# Patient Record
Sex: MALE | Race: WHITE | NOT HISPANIC OR LATINO | Employment: OTHER | URBAN - METROPOLITAN AREA
[De-identification: names, ages, dates, MRNs, and addresses within clinical notes are randomized per-mention and may not be internally consistent; named-entity substitution may affect disease eponyms.]

---

## 2017-04-08 PROBLEM — D53.9 MACROCYTIC ANEMIA: Status: ACTIVE | Noted: 2017-04-08

## 2017-04-10 ENCOUNTER — CONSULT (OUTPATIENT)
Dept: ONCOLOGY | Facility: CLINIC | Age: 82
End: 2017-04-10

## 2017-04-10 VITALS
WEIGHT: 157 LBS | BODY MASS INDEX: 23.79 KG/M2 | HEIGHT: 68 IN | DIASTOLIC BLOOD PRESSURE: 60 MMHG | SYSTOLIC BLOOD PRESSURE: 106 MMHG | TEMPERATURE: 97 F | RESPIRATION RATE: 16 BRPM | HEART RATE: 69 BPM

## 2017-04-10 DIAGNOSIS — D53.9 MACROCYTIC ANEMIA: Primary | ICD-10-CM

## 2017-04-10 PROCEDURE — 99204 OFFICE O/P NEW MOD 45 MIN: CPT | Performed by: INTERNAL MEDICINE

## 2017-04-10 RX ORDER — TAMSULOSIN HYDROCHLORIDE 0.4 MG/1
1 CAPSULE ORAL NIGHTLY
COMMUNITY
End: 2021-01-01

## 2017-04-10 RX ORDER — FLUTICASONE PROPIONATE 50 MCG
SPRAY, SUSPENSION (ML) NASAL
Refills: 1 | COMMUNITY
Start: 2017-04-05 | End: 2021-01-01

## 2017-04-10 RX ORDER — GUAIFENESIN AND PSEUDOEPHEDRINE HYDROCHLORIDE 600; 60 MG/1; MG/1
TABLET, EXTENDED RELEASE ORAL
Refills: 0 | COMMUNITY
Start: 2017-04-05 | End: 2021-01-01

## 2017-04-10 RX ORDER — ALBUTEROL SULFATE 90 UG/1
2 AEROSOL, METERED RESPIRATORY (INHALATION) EVERY 4 HOURS PRN
COMMUNITY
End: 2021-01-01

## 2017-04-10 RX ORDER — PRAVASTATIN SODIUM 20 MG
20 TABLET ORAL NIGHTLY
COMMUNITY
End: 2017-05-23

## 2017-04-10 NOTE — PROGRESS NOTES
"CHIEF COMPLAINT: Macrocytic anemia    REASON FOR REFERRAL: Shana 6 anemia      RECORDS OBTAINED  Records of the patients history including those obtained from  Jagdeep Astudillo were reviewed and summarized in detail.      HISTORY OF PRESENT ILLNESS:  The patient is a 82 y.o.  male, referred for hemoglobin in the upper 11's with an MCV in the upper 90s with reportedly normal B-12 and folic acid.  No change in the color caliber consisting see if his stools.  No jaundice.    REVIEW OF SYSTEMS:  A 14 point review of systems was performed and is negative except as noted above.    History reviewed. No pertinent past medical history.  Past Surgical History:   Procedure Laterality Date   • COLONOSCOPY     • HERNIA REPAIR      1982 & 2014   • SINUS SURGERY      Multiple       No current outpatient prescriptions on file prior to visit.     No current facility-administered medications on file prior to visit.        Allergies   Allergen Reactions   • Bactrim [Sulfamethoxazole-Trimethoprim]    • Penicillins        Social History     Social History   • Marital status:      Spouse name: N/A   • Number of children: N/A   • Years of education: N/A     Social History Main Topics   • Smoking status: Never Smoker   • Smokeless tobacco: Never Used   • Alcohol use Yes      Comment: occ- 4x/monthly   • Drug use: No   • Sexual activity: Not Asked     Other Topics Concern   • None     Social History Narrative   • None       Family History   Problem Relation Age of Onset   • Aneurysm Father    • Cancer Other        PHYSICAL EXAM:    /60  Pulse 69  Temp 97 °F (36.1 °C) (Temporal Artery )   Resp 16  Ht 68\" (172.7 cm)  Wt 157 lb (71.2 kg)  BMI 23.87 kg/m2    ECOG: (2) Ambulatory and capable of self care, unable to carry out work activity, up and about > 50% or waking hours  General: well appearing male in no acute distress  HEENT: sclera anicteric, oropharynx clear  Lymphatics: no cervical, supraclavicular, inguinal, or axillary " adenopathy  Cardiovascular: regular rate and rhythm, no murmurs  Neck: Supple; No thyromegaly  Lungs: clear to auscultation bilaterally. No respiratory distress.   Abdomen: soft, nontender, nondistended.  No palpable organomegaly  Extremities: no cyanosis, clubbing, edema, or cords  Skin: no rashes, lesions, bruising, or petechiae  Neuro: Alert and oriented x 4; Moving all extremities.  Psych: No anxiety or depression    No results found for any previous visit.    Assessment/Plan     Mild macrocytic anemia: The right doubt he has hemolytic anemia causing reticulocytosis, we will check that.  We will also check his methylmalonic acid is occasionally people can have B12 deficiency despite normal B-12 levels.  Beyond that, at the age of 82 with such mild anemia, I would not put him through a bone marrow biopsy as, even if he were to have underlying myelodysplasia, I would only transfuse as need be and if we get into frequent transfusions would perhaps check a marrow at that point to determine whether or not we would give erythropoietin if this were myelodysplastic syndrome.  I would not give him a hypo-methylating agent at this stage in his life.  If these tests are negative then I would just check his blood work about twice a year with a CBC and if the anemia worsens then we can do further evaluation.  His last colonoscopy was 7 years ago in [facility].  We'll get him set up for colonoscopy.  I've spoken with Dr. Astudillo regarding this plan.    Errors in dictation may reflect use of voice recognition software and not all errors in transcription may have been detected prior to signing.    Nam Hurd MD    4/10/2017

## 2017-04-18 ENCOUNTER — TELEPHONE (OUTPATIENT)
Dept: ONCOLOGY | Facility: CLINIC | Age: 82
End: 2017-04-18

## 2017-04-18 NOTE — TELEPHONE ENCOUNTER
----- Message from Anjana Gomes sent at 4/18/2017  2:21 PM EDT -----  Regarding: eden salinas                     pt left message for brad  Contact: [phone]  This patient just wanted to let Brad know why he cxled his appt for today with her.    Pt said he could not complete his colonoscopy and has to go back.    Any questions, please call the patient.

## 2017-05-04 ENCOUNTER — TELEPHONE (OUTPATIENT)
Dept: ONCOLOGY | Facility: CLINIC | Age: 82
End: 2017-05-04

## 2017-05-23 ENCOUNTER — LAB (OUTPATIENT)
Dept: LAB | Facility: HOSPITAL | Age: 82
End: 2017-05-23

## 2017-05-23 ENCOUNTER — OFFICE VISIT (OUTPATIENT)
Dept: ONCOLOGY | Facility: CLINIC | Age: 82
End: 2017-05-23

## 2017-05-23 VITALS
HEIGHT: 68 IN | SYSTOLIC BLOOD PRESSURE: 94 MMHG | RESPIRATION RATE: 16 BRPM | HEART RATE: 74 BPM | WEIGHT: 155 LBS | TEMPERATURE: 97.5 F | DIASTOLIC BLOOD PRESSURE: 46 MMHG | BODY MASS INDEX: 23.49 KG/M2

## 2017-05-23 DIAGNOSIS — D53.9 MACROCYTIC ANEMIA: ICD-10-CM

## 2017-05-23 DIAGNOSIS — D53.9 MACROCYTIC ANEMIA: Primary | ICD-10-CM

## 2017-05-23 LAB
ALBUMIN SERPL-MCNC: 3.8 G/DL (ref 3.2–4.8)
ALBUMIN/GLOB SERPL: 1.5 G/DL (ref 1.5–2.5)
ALP SERPL-CCNC: 87 U/L (ref 25–100)
ALT SERPL W P-5'-P-CCNC: 25 U/L (ref 7–40)
ANION GAP SERPL CALCULATED.3IONS-SCNC: 3 MMOL/L (ref 3–11)
AST SERPL-CCNC: 28 U/L (ref 0–33)
BILIRUB SERPL-MCNC: 1.8 MG/DL (ref 0.3–1.2)
BUN BLD-MCNC: 17 MG/DL (ref 9–23)
BUN/CREAT SERPL: 21.3 (ref 7–25)
CALCIUM SPEC-SCNC: 9.4 MG/DL (ref 8.7–10.4)
CHLORIDE SERPL-SCNC: 109 MMOL/L (ref 99–109)
CO2 SERPL-SCNC: 30 MMOL/L (ref 20–31)
CREAT BLD-MCNC: 0.8 MG/DL (ref 0.6–1.3)
ERYTHROCYTE [DISTWIDTH] IN BLOOD BY AUTOMATED COUNT: 15.9 % (ref 11.3–14.5)
FERRITIN SERPL-MCNC: 138 NG/ML (ref 22–322)
FOLATE SERPL-MCNC: >24 NG/ML (ref 3.2–20)
GFR SERPL CREATININE-BSD FRML MDRD: 93 ML/MIN/1.73
GLOBULIN UR ELPH-MCNC: 2.6 GM/DL
GLUCOSE BLD-MCNC: 101 MG/DL (ref 70–100)
HCT VFR BLD AUTO: 33.8 % (ref 38.9–50.9)
HGB BLD-MCNC: 11.2 G/DL (ref 13.1–17.5)
IRON 24H UR-MRATE: 138 MCG/DL (ref 50–175)
IRON SATN SFR SERPL: 46 % (ref 20–50)
LYMPHOCYTES # BLD AUTO: 2.4 10*3/MM3 (ref 0.6–4.8)
LYMPHOCYTES NFR BLD AUTO: 30.2 % (ref 24–44)
MCH RBC QN AUTO: 33.2 PG (ref 27–31)
MCHC RBC AUTO-ENTMCNC: 33.2 G/DL (ref 32–36)
MCV RBC AUTO: 99.9 FL (ref 80–99)
MONOCYTES # BLD AUTO: 0.6 10*3/MM3 (ref 0–1)
MONOCYTES NFR BLD AUTO: 7.3 % (ref 0–12)
NEUTROPHILS # BLD AUTO: 4.9 10*3/MM3 (ref 1.5–8.3)
NEUTROPHILS NFR BLD AUTO: 62.5 % (ref 41–71)
PLATELET # BLD AUTO: 360 10*3/MM3 (ref 150–450)
PMV BLD AUTO: 6.1 FL (ref 6–12)
POTASSIUM BLD-SCNC: 4.5 MMOL/L (ref 3.5–5.5)
PROT SERPL-MCNC: 6.4 G/DL (ref 5.7–8.2)
RBC # BLD AUTO: 3.39 10*6/MM3 (ref 4.2–5.76)
RETICS/RBC NFR AUTO: 4.01 % (ref 0.5–1.5)
SODIUM BLD-SCNC: 142 MMOL/L (ref 132–146)
TIBC SERPL-MCNC: 300 MCG/DL (ref 250–450)
TSH SERPL DL<=0.05 MIU/L-ACNC: 2.47 MIU/ML (ref 0.35–5.35)
VIT B12 BLD-MCNC: 1947 PG/ML (ref 211–911)
WBC NRBC COR # BLD: 7.8 10*3/MM3 (ref 3.5–10.8)

## 2017-05-23 PROCEDURE — 83010 ASSAY OF HAPTOGLOBIN QUANT: CPT | Performed by: INTERNAL MEDICINE

## 2017-05-23 PROCEDURE — 82746 ASSAY OF FOLIC ACID SERUM: CPT | Performed by: INTERNAL MEDICINE

## 2017-05-23 PROCEDURE — 82728 ASSAY OF FERRITIN: CPT | Performed by: INTERNAL MEDICINE

## 2017-05-23 PROCEDURE — 83550 IRON BINDING TEST: CPT | Performed by: INTERNAL MEDICINE

## 2017-05-23 PROCEDURE — 80053 COMPREHEN METABOLIC PANEL: CPT | Performed by: INTERNAL MEDICINE

## 2017-05-23 PROCEDURE — 83540 ASSAY OF IRON: CPT | Performed by: INTERNAL MEDICINE

## 2017-05-23 PROCEDURE — 82607 VITAMIN B-12: CPT | Performed by: INTERNAL MEDICINE

## 2017-05-23 PROCEDURE — 83921 ORGANIC ACID SINGLE QUANT: CPT | Performed by: INTERNAL MEDICINE

## 2017-05-23 PROCEDURE — 36415 COLL VENOUS BLD VENIPUNCTURE: CPT

## 2017-05-23 PROCEDURE — 85025 COMPLETE CBC W/AUTO DIFF WBC: CPT

## 2017-05-23 PROCEDURE — 99213 OFFICE O/P EST LOW 20 MIN: CPT | Performed by: NURSE PRACTITIONER

## 2017-05-23 PROCEDURE — 84443 ASSAY THYROID STIM HORMONE: CPT | Performed by: INTERNAL MEDICINE

## 2017-05-23 PROCEDURE — 85045 AUTOMATED RETICULOCYTE COUNT: CPT | Performed by: INTERNAL MEDICINE

## 2017-05-23 RX ORDER — PRAVASTATIN SODIUM 40 MG
40 TABLET ORAL NIGHTLY
COMMUNITY
Start: 2017-04-27

## 2017-05-23 RX ORDER — PANTOPRAZOLE SODIUM 40 MG/1
TABLET, DELAYED RELEASE ORAL
Refills: 0 | COMMUNITY
Start: 2017-04-21 | End: 2021-01-01

## 2017-05-24 LAB — HAPTOGLOB SERPL-MCNC: <10 MG/DL (ref 34–200)

## 2017-05-26 LAB — METHYLMALONATE SERPL-SCNC: 146 NMOL/L (ref 0–378)

## 2017-07-20 ENCOUNTER — TELEPHONE (OUTPATIENT)
Dept: ONCOLOGY | Facility: CLINIC | Age: 82
End: 2017-07-20

## 2017-07-20 ENCOUNTER — LAB (OUTPATIENT)
Dept: LAB | Facility: HOSPITAL | Age: 82
End: 2017-07-20

## 2017-07-20 DIAGNOSIS — D53.9 MACROCYTIC ANEMIA: ICD-10-CM

## 2017-07-20 DIAGNOSIS — D53.9 MACROCYTIC ANEMIA: Primary | ICD-10-CM

## 2017-07-20 LAB
ALBUMIN SERPL-MCNC: 3.6 G/DL (ref 3.2–4.8)
ALBUMIN/GLOB SERPL: 1.3 G/DL (ref 1.5–2.5)
ALP SERPL-CCNC: 93 U/L (ref 25–100)
ALT SERPL W P-5'-P-CCNC: 19 U/L (ref 7–40)
ANION GAP SERPL CALCULATED.3IONS-SCNC: 2 MMOL/L (ref 3–11)
AST SERPL-CCNC: 20 U/L (ref 0–33)
BASOPHILS # BLD AUTO: 0.05 10*3/MM3 (ref 0–0.2)
BASOPHILS NFR BLD AUTO: 0.6 % (ref 0–1)
BILIRUB CONJ SERPL-MCNC: 0.3 MG/DL (ref 0–0.2)
BILIRUB SERPL-MCNC: 1 MG/DL (ref 0.3–1.2)
BUN BLD-MCNC: 16 MG/DL (ref 9–23)
BUN/CREAT SERPL: 17.8 (ref 7–25)
CALCIUM SPEC-SCNC: 9.5 MG/DL (ref 8.7–10.4)
CHLORIDE SERPL-SCNC: 109 MMOL/L (ref 99–109)
CLUMPED PLATELETS: PRESENT
CO2 SERPL-SCNC: 28 MMOL/L (ref 20–31)
CREAT BLD-MCNC: 0.9 MG/DL (ref 0.6–1.3)
DEPRECATED RDW RBC AUTO: 60.9 FL (ref 37–54)
EOSINOPHIL # BLD AUTO: 0.47 10*3/MM3 (ref 0–0.3)
EOSINOPHIL NFR BLD AUTO: 5.2 % (ref 0–3)
ERYTHROCYTE [DISTWIDTH] IN BLOOD BY AUTOMATED COUNT: 24.8 % (ref 11.3–14.5)
GFR SERPL CREATININE-BSD FRML MDRD: 81 ML/MIN/1.73
GLOBULIN UR ELPH-MCNC: 2.8 GM/DL
GLUCOSE BLD-MCNC: 101 MG/DL (ref 70–100)
HCT VFR BLD AUTO: 24.4 % (ref 38.9–50.9)
HGB BLD-MCNC: 11.9 G/DL (ref 13.1–17.5)
IMM GRANULOCYTES # BLD: 0.03 10*3/MM3 (ref 0–0.03)
IMM GRANULOCYTES NFR BLD: 0.3 % (ref 0–0.6)
LDH SERPL-CCNC: 164 U/L (ref 120–246)
LYMPHOCYTES # BLD AUTO: 3.1 10*3/MM3 (ref 0.6–4.8)
LYMPHOCYTES NFR BLD AUTO: 34.2 % (ref 24–44)
MACROCYTES BLD QL SMEAR: NORMAL
MCH RBC QN AUTO: 55.9 PG (ref 27–31)
MCHC RBC AUTO-ENTMCNC: 48.8 G/DL (ref 32–36)
MCV RBC AUTO: 114.6 FL (ref 80–99)
MONOCYTES # BLD AUTO: 0.56 10*3/MM3 (ref 0–1)
MONOCYTES NFR BLD AUTO: 6.2 % (ref 0–12)
NEUTROPHILS # BLD AUTO: 4.86 10*3/MM3 (ref 1.5–8.3)
NEUTROPHILS NFR BLD AUTO: 53.5 % (ref 41–71)
PLATELET # BLD AUTO: 288 10*3/MM3 (ref 150–450)
PMV BLD AUTO: 8.7 FL (ref 6–12)
POTASSIUM BLD-SCNC: 4.4 MMOL/L (ref 3.5–5.5)
PROT SERPL-MCNC: 6.4 G/DL (ref 5.7–8.2)
RBC # BLD AUTO: 2.13 10*6/MM3 (ref 4.2–5.76)
RBC AGGLUT BLD QL: NORMAL
SODIUM BLD-SCNC: 139 MMOL/L (ref 132–146)
WBC MORPH BLD: NORMAL
WBC NRBC COR # BLD: 9.07 10*3/MM3 (ref 3.5–10.8)

## 2017-07-20 PROCEDURE — 82248 BILIRUBIN DIRECT: CPT | Performed by: NURSE PRACTITIONER

## 2017-07-20 PROCEDURE — 85025 COMPLETE CBC W/AUTO DIFF WBC: CPT | Performed by: NURSE PRACTITIONER

## 2017-07-20 PROCEDURE — 85060 BLOOD SMEAR INTERPRETATION: CPT | Performed by: NURSE PRACTITIONER

## 2017-07-20 PROCEDURE — 36415 COLL VENOUS BLD VENIPUNCTURE: CPT

## 2017-07-20 PROCEDURE — 80053 COMPREHEN METABOLIC PANEL: CPT | Performed by: NURSE PRACTITIONER

## 2017-07-20 PROCEDURE — 85007 BL SMEAR W/DIFF WBC COUNT: CPT | Performed by: NURSE PRACTITIONER

## 2017-07-20 PROCEDURE — 83615 LACTATE (LD) (LDH) ENZYME: CPT | Performed by: NURSE PRACTITIONER

## 2017-07-20 PROCEDURE — 83010 ASSAY OF HAPTOGLOBIN QUANT: CPT | Performed by: NURSE PRACTITIONER

## 2017-07-20 PROCEDURE — 85045 AUTOMATED RETICULOCYTE COUNT: CPT | Performed by: NURSE PRACTITIONER

## 2017-07-20 NOTE — TELEPHONE ENCOUNTER
----- Message from Db Eisenberg sent at 7/20/2017  1:37 PM EDT -----  Regarding: Vannessa requesting lab results  Contact: [phone]  Patient wants results of last blood work.

## 2017-07-20 NOTE — TELEPHONE ENCOUNTER
I reviewed the patient's labs from 5/23/2017.  His haptoglobin was decreased with an increase reticulocyte count therefore I am concerned about possible hemolysis.  I reviewed this with the patient, will get a CBC, peripheral smear, reticulocyte count, LDH, haptoglobin, CMP and bilirubin and have patient follow-up with us on Tuesday.  He will get his labs done this week so we will have the results at the time of his visit.  He states understanding and agreement.

## 2017-07-25 ENCOUNTER — OFFICE VISIT (OUTPATIENT)
Dept: ONCOLOGY | Facility: CLINIC | Age: 82
End: 2017-07-25

## 2017-07-25 VITALS
BODY MASS INDEX: 23.19 KG/M2 | HEART RATE: 83 BPM | SYSTOLIC BLOOD PRESSURE: 112 MMHG | WEIGHT: 153 LBS | TEMPERATURE: 97.1 F | HEIGHT: 68 IN | DIASTOLIC BLOOD PRESSURE: 56 MMHG | RESPIRATION RATE: 16 BRPM

## 2017-07-25 DIAGNOSIS — D53.9 MACROCYTIC ANEMIA: Primary | ICD-10-CM

## 2017-07-25 PROCEDURE — 99213 OFFICE O/P EST LOW 20 MIN: CPT | Performed by: INTERNAL MEDICINE

## 2017-07-25 RX ORDER — ALPROSTADIL 20.5 UG/ML
INJECTION, POWDER, LYOPHILIZED, FOR SOLUTION INTRACAVERNOUS
COMMUNITY
Start: 2017-06-16 | End: 2021-01-01

## 2017-07-25 RX ORDER — TIOTROPIUM BROMIDE 18 UG/1
CAPSULE ORAL; RESPIRATORY (INHALATION)
COMMUNITY
Start: 2017-06-05 | End: 2021-01-01

## 2017-07-25 NOTE — PROGRESS NOTES
CHIEF COMPLAINT: Macrocytic anemia    Problem List:  Oncology/Hematology History    1.  Macrocytic anemia        Macrocytic anemia     Initial Diagnosis     Macrocytic anemia         4/17/2017 Procedure     EGD          5/9/2017 Procedure     Colonoscopy            HISTORY OF PRESENT ILLNESS:  The patient is a 83 y.o. male, here for follow up on management of Macrocytic anemia.  His hemoglobin on July 20 was 11.9 with an MCV of 115 with an Jp normal and a direct bilirubin slightly elevated 0.3 and anisocytosis.  His B12 was high at 1947 with a folate high at 24.  He had a normal methylmalonic acid.  His transferrin saturation was normal at 46%.  Had a normal LDH.  His haptoglobin however was less than 10 and a reticulocyte count of 4%.  Prior negative EGD and colonoscopy as outlined above  History reviewed. No pertinent past medical history.  Past Surgical History:   Procedure Laterality Date   • COLONOSCOPY     • HERNIA REPAIR      1982 & 2014   • SINUS SURGERY      Multiple       Allergies   Allergen Reactions   • Bactrim [Sulfamethoxazole-Trimethoprim]    • Penicillins        Family History and Social History reviewed and changed as necessary      REVIEW OF SYSTEM:   Review of Systems   Constitutional: Negative for appetite change, chills, diaphoresis, fatigue, fever and unexpected weight change.   HENT:   Negative for mouth sores, sore throat and trouble swallowing.    Eyes: Negative for icterus.   Respiratory: Negative for cough, hemoptysis and shortness of breath.    Cardiovascular: Negative for chest pain, leg swelling and palpitations.   Gastrointestinal: Negative for abdominal distention, abdominal pain, blood in stool, constipation, diarrhea, nausea and vomiting.   Endocrine: Negative for hot flashes.   Genitourinary: Negative for bladder incontinence, difficulty urinating, dysuria, frequency and hematuria.    Musculoskeletal: Negative for gait problem, neck pain and neck stiffness.   Skin: Negative for  "rash.   Neurological: Negative for dizziness, gait problem, headaches, light-headedness and numbness.   Hematological: Negative for adenopathy. Does not bruise/bleed easily.   Psychiatric/Behavioral: Negative for depression. The patient is not nervous/anxious.    All other systems reviewed and are negative.       PHYSICAL EXAM    Vitals:    07/25/17 1515   BP: 112/56   Pulse: 83   Resp: 16   Temp: 97.1 °F (36.2 °C)   TempSrc: Temporal Artery    Weight: 153 lb (69.4 kg)   Height: 68\" (172.7 cm)     Constitutional: Appears well-developed and well-nourished. No distress.   ECOG: (0) Fully active, able to carry on all predisease performance without restriction  HENT:   Head: Normocephalic.   Mouth/Throat: Oropharynx is clear and moist.   Eyes: Conjunctivae are normal. Pupils are equal, round, and reactive to light. No scleral icterus.   Neck: Neck supple. No JVD present. No thyromegaly present.   Cardiovascular: Normal rate, regular rhythm and normal heart sounds.    Pulmonary/Chest: Breath sounds normal. No respiratory distress.   Abdominal: Soft. Exhibits no distension and no mass. There is no hepatosplenomegaly. There is no tenderness. There is no rebound and no guarding.   Musculoskeletal:Exhibits no edema, tenderness or deformity.   Neurological: Alert and oriented to person, place, and time. Exhibits normal muscle tone.   Skin: No ecchymosis, no petechiae and no rash noted. Not diaphoretic. No cyanosis. Nails show no clubbing.   Psychiatric: Normal mood and affect.   Vitals reviewed.      Lab on 07/20/2017   Component Date Value Ref Range Status   • Glucose 07/20/2017 101* 70 - 100 mg/dL Final   • BUN 07/20/2017 16  9 - 23 mg/dL Final   • Creatinine 07/20/2017 0.90  0.60 - 1.30 mg/dL Final   • Sodium 07/20/2017 139  132 - 146 mmol/L Final   • Potassium 07/20/2017 4.4  3.5 - 5.5 mmol/L Final   • Chloride 07/20/2017 109  99 - 109 mmol/L Final   • CO2 07/20/2017 28.0  20.0 - 31.0 mmol/L Final   • Calcium 07/20/2017 " 9.5  8.7 - 10.4 mg/dL Final   • Total Protein 07/20/2017 6.4  5.7 - 8.2 g/dL Final   • Albumin 07/20/2017 3.60  3.20 - 4.80 g/dL Final   • ALT (SGPT) 07/20/2017 19  7 - 40 U/L Final   • AST (SGOT) 07/20/2017 20  0 - 33 U/L Final   • Alkaline Phosphatase 07/20/2017 93  25 - 100 U/L Final   • Total Bilirubin 07/20/2017 1.0  0.3 - 1.2 mg/dL Final   • eGFR Non  Amer 07/20/2017 81  >60 mL/min/1.73 Final   • Globulin 07/20/2017 2.8  gm/dL Final   • A/G Ratio 07/20/2017 1.3* 1.5 - 2.5 g/dL Final   • BUN/Creatinine Ratio 07/20/2017 17.8  7.0 - 25.0 Final   • Anion Gap 07/20/2017 2.0* 3.0 - 11.0 mmol/L Final   • LDH 07/20/2017 164  120 - 246 U/L Final   • Haptoglobin 07/20/2017 <10* 34 - 200 mg/dL Final   • Performed by: 07/20/2017 Dr. Baltazar Mccloud M.D.     Final   • Pathologist Interpretation 07/20/2017 RBC Macrocytic, RBC fragments, marked anisocytosis  Anemia  Occasional hypersegmented PMN  Some reactive lymphocytes  Suggest performimg Folate and B12 levels    Final   • Bilirubin, Direct 07/20/2017 0.3* 0.0 - 0.2 mg/dL Final   • WBC 07/20/2017 9.07  3.50 - 10.80 10*3/mm3 Final   • RBC 07/20/2017 2.13* 4.20 - 5.76 10*6/mm3 Final   • Hemoglobin 07/20/2017 11.9* 13.1 - 17.5 g/dL Final   • Hematocrit 07/20/2017 24.4* 38.9 - 50.9 % Final   • MCV 07/20/2017 114.6* 80.0 - 99.0 fL Final   • MCH 07/20/2017 55.9* 27.0 - 31.0 pg Final   • MCHC 07/20/2017 48.8* 32.0 - 36.0 g/dL Final   • RDW 07/20/2017 24.8* 11.3 - 14.5 % Final   • RDW-SD 07/20/2017 60.9* 37.0 - 54.0 fl Final   • MPV 07/20/2017 8.7  6.0 - 12.0 fL Final   • Platelets 07/20/2017 288  150 - 450 10*3/mm3 Final   • Neutrophil % 07/20/2017 53.5  41.0 - 71.0 % Final   • Lymphocyte % 07/20/2017 34.2  24.0 - 44.0 % Final   • Monocyte % 07/20/2017 6.2  0.0 - 12.0 % Final   • Eosinophil % 07/20/2017 5.2* 0.0 - 3.0 % Final   • Basophil % 07/20/2017 0.6  0.0 - 1.0 % Final   • Immature Grans % 07/20/2017 0.3  0.0 - 0.6 % Final   • Neutrophils, Absolute 07/20/2017 4.86   1.50 - 8.30 10*3/mm3 Final   • Lymphocytes, Absolute 07/20/2017 3.10  0.60 - 4.80 10*3/mm3 Final   • Monocytes, Absolute 07/20/2017 0.56  0.00 - 1.00 10*3/mm3 Final   • Eosinophils, Absolute 07/20/2017 0.47* 0.00 - 0.30 10*3/mm3 Final   • Basophils, Absolute 07/20/2017 0.05  0.00 - 0.20 10*3/mm3 Final   • Immature Grans, Absolute 07/20/2017 0.03  0.00 - 0.03 10*3/mm3 Final   • Macrocytes 07/20/2017 Mod/2+  None Seen Final   • RBC Agglutination 07/20/2017 Large/3+  None Seen Final   • WBC Morphology 07/20/2017 Normal  Normal Final   • Clumped Platelets 07/20/2017 Present  None Seen Final       Assessment/Plan     1.  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  I will not look for esoteric causes for hemolysis.  We'll simply check a [place] direct and indirect.  Usually with a decreased haptoglobin there would be intravascular hemolysis but with a normal bilirubin and a normal LDH I think it's unlikely to be profound intravascular hemolysis and the most that I would do for this elderly gentleman would be a trial of steroids if I found a warm antibody that was causing significant anemia.  At present, presuming this is compensated hemolysis and not reticulocytosis due to GI blood loss for which she's already had a GI workup, then I would not even put him through the risk of steroids if he is otherwise not anemic or symptomatic.      Nam Hurd MD    07/25/2017

## 2017-07-26 ENCOUNTER — LAB (OUTPATIENT)
Dept: LAB | Facility: HOSPITAL | Age: 82
End: 2017-07-26

## 2017-07-26 DIAGNOSIS — D53.9 MACROCYTIC ANEMIA: ICD-10-CM

## 2017-07-26 LAB
BLD GP AB SCN SERPL QL: POSITIVE
COLD AUTO ANTIBODY: NORMAL
COLD SCREEN: POSITIVE
SALINE SCREEN: POSITIVE

## 2017-07-26 PROCEDURE — 86870 RBC ANTIBODY IDENTIFICATION: CPT | Performed by: INTERNAL MEDICINE

## 2017-07-26 PROCEDURE — 86880 COOMBS TEST DIRECT: CPT | Performed by: INTERNAL MEDICINE

## 2017-07-26 PROCEDURE — 36415 COLL VENOUS BLD VENIPUNCTURE: CPT

## 2017-07-26 PROCEDURE — 86850 RBC ANTIBODY SCREEN: CPT | Performed by: INTERNAL MEDICINE

## 2017-07-27 LAB
DAT C3: NORMAL
DAT IGG GEL: NEGATIVE
DAT POLY-SP REAG RBC QL: NORMAL

## 2017-08-29 ENCOUNTER — OFFICE VISIT (OUTPATIENT)
Dept: ONCOLOGY | Facility: CLINIC | Age: 82
End: 2017-08-29

## 2017-08-29 VITALS
DIASTOLIC BLOOD PRESSURE: 58 MMHG | BODY MASS INDEX: 23.37 KG/M2 | TEMPERATURE: 97.5 F | WEIGHT: 154.2 LBS | RESPIRATION RATE: 16 BRPM | SYSTOLIC BLOOD PRESSURE: 114 MMHG | HEART RATE: 76 BPM | HEIGHT: 68 IN

## 2017-08-29 DIAGNOSIS — D59.12 COLD AUTOIMMUNE HEMOLYTIC ANEMIA (HCC): Primary | ICD-10-CM

## 2017-08-29 PROCEDURE — 99213 OFFICE O/P EST LOW 20 MIN: CPT | Performed by: INTERNAL MEDICINE

## 2017-08-29 NOTE — PROGRESS NOTES
CHIEF COMPLAINT: Cold hemolytic anemia    Problem List:  Oncology/Hematology History    1.  Macrocytic anemia        Macrocytic anemia     Initial Diagnosis     Macrocytic anemia         4/17/2017 Procedure     EGD          5/9/2017 Procedure     Colonoscopy            HISTORY OF PRESENT ILLNESS:  The patient is a 83 y.o. 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  No significant anemia with this however.  History reviewed. No pertinent past medical history.  Past Surgical History:   Procedure Laterality Date   • COLONOSCOPY     • HERNIA REPAIR      1982 & 2014   • SINUS SURGERY      Multiple       Allergies   Allergen Reactions   • Bactrim [Sulfamethoxazole-Trimethoprim]    • Penicillins        Family History and Social History reviewed and changed as necessary      REVIEW OF SYSTEM:   Review of Systems   Constitutional: Negative for appetite change, chills, diaphoresis, fatigue, fever and unexpected weight change.   HENT:   Negative for mouth sores, sore throat and trouble swallowing.    Eyes: Negative for icterus.   Respiratory: Negative for cough, hemoptysis and shortness of breath.    Cardiovascular: Negative for chest pain, leg swelling and palpitations.   Gastrointestinal: Negative for abdominal distention, abdominal pain, blood in stool, constipation, diarrhea, nausea and vomiting.   Endocrine: Negative for hot flashes.   Genitourinary: Negative for bladder incontinence, difficulty urinating, dysuria, frequency and hematuria.    Musculoskeletal: Negative for gait problem, neck pain and neck stiffness.   Skin: Negative for rash.   Neurological: Negative for dizziness, gait problem, headaches, light-headedness and numbness.   Hematological: Negative for adenopathy. Does not bruise/bleed easily.   Psychiatric/Behavioral: Negative for depression. The patient is not nervous/anxious.    All other systems reviewed and are negative.       PHYSICAL EXAM    Vitals:    08/29/17 0812   BP: 114/58  "  Pulse: 76   Resp: 16   Temp: 97.5 °F (36.4 °C)   TempSrc: Temporal Artery    Weight: 154 lb 3.2 oz (69.9 kg)   Height: 68\" (172.7 cm)     Constitutional: Appears well-developed and well-nourished. No distress.   ECOG: (0) Fully active, able to carry on all predisease performance without restriction  HENT:   Head: Normocephalic.   Mouth/Throat: Oropharynx is clear and moist.   Eyes: Conjunctivae are normal. Pupils are equal, round, and reactive to light. No scleral icterus.   Neck: Neck supple. No JVD present. No thyromegaly present.   Cardiovascular: Normal rate, regular rhythm and normal heart sounds.    Pulmonary/Chest: Breath sounds normal. No respiratory distress.   Abdominal: Soft. Exhibits no distension and no mass. There is no hepatosplenomegaly. There is no tenderness. There is no rebound and no guarding.   Musculoskeletal:Exhibits no edema, tenderness or deformity.   Neurological: Alert and oriented to person, place, and time. Exhibits normal muscle tone.   Skin: No ecchymosis, no petechiae and no rash noted. Not diaphoretic. No cyanosis. Nails show no clubbing.   Psychiatric: Normal mood and affect.   Vitals reviewed.      Lab on 07/26/2017   Component Date Value Ref Range Status   • [place] 07/26/2017 Invalid   Final   • Antibody Screen 07/26/2017 Positive   Final   • Cold Screen 07/26/2017 Positive   Final   • Saline Screen 07/26/2017 Positive   Final   • Cold Autoantibody 07/26/2017 COLD AUTO ANTIBODY   Final   • [place] IgG 07/26/2017 Negative   Final   • [place] C3 07/26/2017 Invalid   Corrected    Corrected result. Previous result was Positive on 7/27/2017 at 1014 EDT   Lab on 07/20/2017   Component Date Value Ref Range Status   • Glucose 07/20/2017 101* 70 - 100 mg/dL Final   • BUN 07/20/2017 16  9 - 23 mg/dL Final   • Creatinine 07/20/2017 0.90  0.60 - 1.30 mg/dL Final   • Sodium 07/20/2017 139  132 - 146 mmol/L Final   • Potassium 07/20/2017 4.4  3.5 - 5.5 mmol/L Final   • Chloride 07/20/2017 109  99 - 109 " mmol/L Final   • CO2 07/20/2017 28.0  20.0 - 31.0 mmol/L Final   • Calcium 07/20/2017 9.5  8.7 - 10.4 mg/dL Final   • Total Protein 07/20/2017 6.4  5.7 - 8.2 g/dL Final   • Albumin 07/20/2017 3.60  3.20 - 4.80 g/dL Final   • ALT (SGPT) 07/20/2017 19  7 - 40 U/L Final   • AST (SGOT) 07/20/2017 20  0 - 33 U/L Final   • Alkaline Phosphatase 07/20/2017 93  25 - 100 U/L Final   • Total Bilirubin 07/20/2017 1.0  0.3 - 1.2 mg/dL Final   • eGFR Non  Amer 07/20/2017 81  >60 mL/min/1.73 Final   • Globulin 07/20/2017 2.8  gm/dL Final   • A/G Ratio 07/20/2017 1.3* 1.5 - 2.5 g/dL Final   • BUN/Creatinine Ratio 07/20/2017 17.8  7.0 - 25.0 Final   • Anion Gap 07/20/2017 2.0* 3.0 - 11.0 mmol/L Final   • LDH 07/20/2017 164  120 - 246 U/L Final   • Haptoglobin 07/20/2017 <10* 34 - 200 mg/dL Final   • Performed by: 07/20/2017 Dr. Baltazar Mccloud M.D.     Final   • Pathologist Interpretation 07/20/2017 RBC Macrocytic, RBC fragments, marked anisocytosis  Anemia  Occasional hypersegmented PMN  Some reactive lymphocytes  Suggest performimg Folate and B12 levels    Final   • Bilirubin, Direct 07/20/2017 0.3* 0.0 - 0.2 mg/dL Final   • WBC 07/20/2017 9.07  3.50 - 10.80 10*3/mm3 Final   • RBC 07/20/2017 2.13* 4.20 - 5.76 10*6/mm3 Final   • Hemoglobin 07/20/2017 11.9* 13.1 - 17.5 g/dL Final   • Hematocrit 07/20/2017 24.4* 38.9 - 50.9 % Final   • MCV 07/20/2017 114.6* 80.0 - 99.0 fL Final   • MCH 07/20/2017 55.9* 27.0 - 31.0 pg Final   • MCHC 07/20/2017 48.8* 32.0 - 36.0 g/dL Final   • RDW 07/20/2017 24.8* 11.3 - 14.5 % Final   • RDW-SD 07/20/2017 60.9* 37.0 - 54.0 fl Final   • MPV 07/20/2017 8.7  6.0 - 12.0 fL Final   • Platelets 07/20/2017 288  150 - 450 10*3/mm3 Final   • Neutrophil % 07/20/2017 53.5  41.0 - 71.0 % Final   • Lymphocyte % 07/20/2017 34.2  24.0 - 44.0 % Final   • Monocyte % 07/20/2017 6.2  0.0 - 12.0 % Final   • Eosinophil % 07/20/2017 5.2* 0.0 - 3.0 % Final   • Basophil % 07/20/2017 0.6  0.0 - 1.0 % Final   • Immature  Grans % 07/20/2017 0.3  0.0 - 0.6 % Final   • Neutrophils, Absolute 07/20/2017 4.86  1.50 - 8.30 10*3/mm3 Final   • Lymphocytes, Absolute 07/20/2017 3.10  0.60 - 4.80 10*3/mm3 Final   • Monocytes, Absolute 07/20/2017 0.56  0.00 - 1.00 10*3/mm3 Final   • Eosinophils, Absolute 07/20/2017 0.47* 0.00 - 0.30 10*3/mm3 Final   • Basophils, Absolute 07/20/2017 0.05  0.00 - 0.20 10*3/mm3 Final   • Immature Grans, Absolute 07/20/2017 0.03  0.00 - 0.03 10*3/mm3 Final   • Macrocytes 07/20/2017 Mod/2+  None Seen Final   • RBC Agglutination 07/20/2017 Large/3+  None Seen Final   • WBC Morphology 07/20/2017 Normal  Normal Final   • Clumped Platelets 07/20/2017 Present  None Seen Final       Assessment/Plan     1.  Cold agglutinins  2. 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  He does not have significant anemia with this but this does explain his macrocytosis and elevated reticulocyte count causing some.  This is not generally steroid sensitive as warm hemolytic anemia but mainly he is treated by weakness of cold temperatures.  I do not know what the exact thermal amplitude of his cold hemolysis is but certainly in these warm weather months, his compensated hemolysis is apparent as his hemoglobin is staying in the 11's.  Not because of folate or B12 deficiency but due to the possibility that he will demand more of this due to the increased usage I did ask him to supplement his diet with folic acid and B12.  He can do this orally.  If he drops his hemoglobin especially in the cold months then it may behoove him to seek warmer climates.  I will see him back with my nurse practitioner midwinter to make sure the hemoglobin is holding up.  I would not search for occult malignant causes of the cold antibody at this point at the age of 83 and otherwise healthy-appearing and healthy feeling gentleman.  Has had EGD and colonoscopy with Dr. Contreras and placed on proton pump  inhibitor.      Nam Hurd MD    08/29/2017

## 2018-01-16 ENCOUNTER — OFFICE VISIT (OUTPATIENT)
Dept: ONCOLOGY | Facility: CLINIC | Age: 83
End: 2018-01-16

## 2018-01-16 ENCOUNTER — LAB (OUTPATIENT)
Dept: LAB | Facility: HOSPITAL | Age: 83
End: 2018-01-16

## 2018-01-16 VITALS
TEMPERATURE: 97.2 F | HEIGHT: 68 IN | HEART RATE: 95 BPM | BODY MASS INDEX: 23.49 KG/M2 | SYSTOLIC BLOOD PRESSURE: 121 MMHG | RESPIRATION RATE: 16 BRPM | DIASTOLIC BLOOD PRESSURE: 58 MMHG | WEIGHT: 155 LBS

## 2018-01-16 DIAGNOSIS — D59.12 COLD AUTOIMMUNE HEMOLYTIC ANEMIA (HCC): ICD-10-CM

## 2018-01-16 DIAGNOSIS — D53.9 MACROCYTIC ANEMIA: Primary | ICD-10-CM

## 2018-01-16 LAB
ERYTHROCYTE [DISTWIDTH] IN BLOOD BY AUTOMATED COUNT: 14.7 % (ref 11.3–14.5)
HCT VFR BLD AUTO: 36.3 % (ref 38.9–50.9)
HGB BLD-MCNC: 11.7 G/DL (ref 13.1–17.5)
LYMPHOCYTES # BLD AUTO: 2.6 10*3/MM3 (ref 0.6–4.8)
LYMPHOCYTES NFR BLD AUTO: 33.4 % (ref 24–44)
MCH RBC QN AUTO: 32.6 PG (ref 27–31)
MCHC RBC AUTO-ENTMCNC: 32.3 G/DL (ref 32–36)
MCV RBC AUTO: 100.9 FL (ref 80–99)
MONOCYTES # BLD AUTO: 0.7 10*3/MM3 (ref 0–1)
MONOCYTES NFR BLD AUTO: 8.5 % (ref 0–12)
NEUTROPHILS # BLD AUTO: 4.6 10*3/MM3 (ref 1.5–8.3)
NEUTROPHILS NFR BLD AUTO: 58.1 % (ref 41–71)
PLATELET # BLD AUTO: 379 10*3/MM3 (ref 150–450)
PMV BLD AUTO: 6.1 FL (ref 6–12)
RBC # BLD AUTO: 3.6 10*6/MM3 (ref 4.2–5.76)
WBC NRBC COR # BLD: 7.9 10*3/MM3 (ref 3.5–10.8)

## 2018-01-16 PROCEDURE — 99213 OFFICE O/P EST LOW 20 MIN: CPT | Performed by: NURSE PRACTITIONER

## 2018-01-16 PROCEDURE — 85025 COMPLETE CBC W/AUTO DIFF WBC: CPT

## 2018-01-16 PROCEDURE — 36415 COLL VENOUS BLD VENIPUNCTURE: CPT

## 2018-01-16 RX ORDER — SILODOSIN 8 MG/1
CAPSULE ORAL
COMMUNITY
Start: 2018-01-15 | End: 2021-01-01

## 2018-01-16 NOTE — PROGRESS NOTES
CHIEF COMPLAINT: Cold hemolytic anemia    Problem List:  Oncology/Hematology History    1.  Macrocytic anemia        Macrocytic anemia     Initial Diagnosis     Macrocytic anemia         4/17/2017 Procedure     EGD          5/9/2017 Procedure     Colonoscopy            HISTORY OF PRESENT ILLNESS:  The patient is a 83 y.o. 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  No significant anemia with this however.  CBC from today was stable and normal identical to 6 months ago with hemoglobin of 11.7.  The patient has been doing well since we saw him last with no illnesses or new concerns.  He states that he actually had to shovel his driveway in order to get the appointment today!      History reviewed. No pertinent past medical history.  Past Surgical History:   Procedure Laterality Date   • COLONOSCOPY     • HERNIA REPAIR      1982 & 2014   • SINUS SURGERY      Multiple       Allergies   Allergen Reactions   • Bactrim [Sulfamethoxazole-Trimethoprim]    • Penicillins        Family History and Social History reviewed and changed as necessary      REVIEW OF SYSTEM:   Review of Systems   Constitutional: Negative for appetite change, chills, diaphoresis, fatigue, fever and unexpected weight change.   HENT:   Negative for mouth sores, sore throat and trouble swallowing.    Eyes: Negative for icterus.   Respiratory: Negative for cough, hemoptysis and shortness of breath.    Cardiovascular: Negative for chest pain, leg swelling and palpitations.   Gastrointestinal: Negative for abdominal distention, abdominal pain, blood in stool, constipation, diarrhea, nausea and vomiting.   Endocrine: Negative for hot flashes.   Genitourinary: Negative for bladder incontinence, difficulty urinating, dysuria, frequency and hematuria.    Musculoskeletal: Negative for gait problem, neck pain and neck stiffness.   Skin: Negative for rash.   Neurological: Negative for dizziness, gait problem, headaches, light-headedness and  "numbness.   Hematological: Negative for adenopathy. Does not bruise/bleed easily.   Psychiatric/Behavioral: Negative for depression. The patient is not nervous/anxious.    All other systems reviewed and are negative.       PHYSICAL EXAM    Vitals:    01/16/18 0939   BP: 121/58   Pulse: 95   Resp: 16   Temp: 97.2 °F (36.2 °C)   Weight: 70.3 kg (155 lb)   Height: 172.7 cm (68\")     Constitutional: Appears well-developed and well-nourished. No distress.   ECOG: (0) Fully active, able to carry on all predisease performance without restriction  HENT:   Head: Normocephalic.   Mouth/Throat: Oropharynx is clear and moist.   Eyes: Conjunctivae are normal. Pupils are equal, round, and reactive to light. No scleral icterus.   Neck: Neck supple. No JVD present. No thyromegaly present.   Cardiovascular: Normal rate, regular rhythm and normal heart sounds.    Pulmonary/Chest: Breath sounds normal. No respiratory distress.   Abdominal: Soft. Exhibits no distension and no mass. There is no hepatosplenomegaly. There is no tenderness. There is no rebound and no guarding.   Musculoskeletal:Exhibits no edema, tenderness or deformity.   Neurological: Alert and oriented to person, place, and time. Exhibits normal muscle tone.   Skin: No ecchymosis, no petechiae and no rash noted. Not diaphoretic. No cyanosis. Nails show no clubbing.   Psychiatric: Normal mood and affect.   Vitals reviewed.      Lab on 01/16/2018   Component Date Value Ref Range Status   • WBC 01/16/2018 7.90  3.50 - 10.80 10*3/mm3 Final   • RBC 01/16/2018 3.60* 4.20 - 5.76 10*6/mm3 Final   • Hemoglobin 01/16/2018 11.7* 13.1 - 17.5 g/dL Final   • Hematocrit 01/16/2018 36.3* 38.9 - 50.9 % Final   • RDW 01/16/2018 14.7* 11.3 - 14.5 % Final   • MCV 01/16/2018 100.9* 80.0 - 99.0 fL Final   • MCH 01/16/2018 32.6* 27.0 - 31.0 pg Final   • MCHC 01/16/2018 32.3  32.0 - 36.0 g/dL Final   • MPV 01/16/2018 6.1  6.0 - 12.0 fL Final   • Platelets 01/16/2018 379  150 - 450 10*3/mm3 " Final   • Neutrophil % 01/16/2018 58.1  41.0 - 71.0 % Final   • Lymphocyte % 01/16/2018 33.4  24.0 - 44.0 % Final   • Monocyte % 01/16/2018 8.5  0.0 - 12.0 % Final   • Neutrophils, Absolute 01/16/2018 4.60  1.50 - 8.30 10*3/mm3 Final   • Lymphocytes, Absolute 01/16/2018 2.60  0.60 - 4.80 10*3/mm3 Final   • Monocytes, Absolute 01/16/2018 0.70  0.00 - 1.00 10*3/mm3 Final       Assessment/Plan     1. Cold agglutinins  2. Hemolytic anemia    Discussion: Patient is doing well and his CBC is stable.  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  He does not have significant anemia with this.  We will see him in 6 months for follow up with CBC on return.      Theresa Mistry, YURIDIA    01/16/2018

## 2019-04-26 ENCOUNTER — OFFICE VISIT (OUTPATIENT)
Dept: FAMILY MEDICINE CLINIC | Facility: CLINIC | Age: 84
End: 2019-04-26

## 2019-04-26 VITALS
BODY MASS INDEX: 22.07 KG/M2 | HEIGHT: 69 IN | SYSTOLIC BLOOD PRESSURE: 110 MMHG | OXYGEN SATURATION: 98 % | WEIGHT: 149 LBS | HEART RATE: 70 BPM | DIASTOLIC BLOOD PRESSURE: 70 MMHG

## 2019-04-26 DIAGNOSIS — K22.70 BARRETT'S ESOPHAGUS WITHOUT DYSPLASIA: ICD-10-CM

## 2019-04-26 DIAGNOSIS — E78.5 HYPERLIPIDEMIA, UNSPECIFIED HYPERLIPIDEMIA TYPE: ICD-10-CM

## 2019-04-26 DIAGNOSIS — R35.1 BPH ASSOCIATED WITH NOCTURIA: ICD-10-CM

## 2019-04-26 DIAGNOSIS — N40.1 BPH ASSOCIATED WITH NOCTURIA: ICD-10-CM

## 2019-04-26 DIAGNOSIS — J45.40 MODERATE PERSISTENT ASTHMA WITHOUT COMPLICATION: Primary | ICD-10-CM

## 2019-04-26 PROBLEM — J45.909 ASTHMA: Status: ACTIVE | Noted: 2019-04-26

## 2019-04-26 PROCEDURE — 99203 OFFICE O/P NEW LOW 30 MIN: CPT | Performed by: FAMILY MEDICINE

## 2019-04-26 NOTE — PATIENT INSTRUCTIONS
1.  Continue medications and supplements - as listed.    2.  Continue well-balanced diet - low in calories and low in added sugar.    3.  Maintain a routine exercise program - every week.

## 2019-04-26 NOTE — PROGRESS NOTES
Subjective   Negrito Joe is a 84 y.o. male.     Chief Complaint   Patient presents with   • Establish Care       History of Present Illness     Previous primary care: [facility]    Chronic health conditions:  Asthma: Moderate, his last exacerbation requiring ER visit was over 5 years ago.  This had been managed by Dr. Hodges, however she is no longer practicing here  Cold-hemolytic anemia: Continues to follow-up with oncology  Nocturia: He has been on Rapaflo and Flomax without any benefit over the past 2 years  Monroe's esophagus- stable on PPI  History of colon polyps    Other physicians currently involved in patient's care:  Dr. Fontana- urology  Dr. 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 Contreras: Mistry's esophagus    Acute complaints:  Negrito Joe is a 84 y.o. male who presents today to establish care.  He has no acute complaints today.    The following portions of the patient's history were reviewed and updated as appropriate: allergies, current medications, past family history, past medical history, past social history, past surgical history and problem list.    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   • Asthma 04/26/2019   • Hyperlipidemia 04/26/2019     Resolved Ambulatory Problems     Diagnosis Date Noted   • No Resolved Ambulatory Problems     Past Medical History:   Diagnosis Date   • Asthma      Past Surgical History:   Procedure Laterality Date   • COLONOSCOPY     • HERNIA REPAIR      1982 & 2014   • SINUS SURGERY      Multiple     Family History   Problem Relation Age of Onset   • Aneurysm Father    • Heart attack Father    • Cancer Other      Social History     Socioeconomic History   • Marital status:      Spouse name: Not on file   • Number of children: Not on file   •  "Years of education: Not on file   • Highest education level: Not on file   Tobacco Use   • Smoking status: Never Smoker   • Smokeless tobacco: Never Used   Substance and Sexual Activity   • Alcohol use: Yes     Comment: occ- 4x/monthly   • Drug use: No         Review of Systems   Constitutional: Negative.    HENT: Negative.    Eyes: Negative.    Respiratory: Negative for cough, chest tightness, shortness of breath and wheezing.    Cardiovascular: Negative for chest pain and palpitations.   Gastrointestinal: Negative for abdominal distention, abdominal pain, constipation, diarrhea, nausea and vomiting.   Musculoskeletal: Negative for gait problem and joint swelling.   Skin: Negative for color change and wound.        Recent scalp laceration repaired at urgent care   Psychiatric/Behavioral: Negative for agitation and hallucinations.       Objective   Blood pressure 110/70, pulse 70, height 176 cm (69.29\"), weight 67.6 kg (149 lb), SpO2 98 %.  Nursing note reviewed  Physical Exam  Const: NAD, A&Ox4, Pleasant  Eyes: EOMI, no conjunctivitis  ENT: No nasal discharge present, neck supple.  He speaks with a strained voice  Cardiac: Regular rate and rhythm, no cyanosis  Resp: Respiratory rate within normal limits, no increased work of breathing, no audible wheezing or retractions noted  GI: No distention or ascites  MSK: Motor and sensation grossly intact in bilateral upper extremities  Neurologic: CN II-XII grossly intact  Psych: Appropriate mood and behavior.  Skin: Pink, warm, dry  Procedures  Assessment/Plan   Negrito was seen today for establish care.    Diagnoses and all orders for this visit:    Moderate persistent asthma without complication    Hyperlipidemia, unspecified hyperlipidemia type  Comments:  No longer takes pravastatin  Orders:  -     Comprehensive Metabolic Panel; Future  -     Lipid Panel; Future    Mistry's esophagus without dysplasia  Comments:  Dr. Contreras    BPH associated with " nocturia  Comments:  Dr. Fontana        Asthma: Moderate, his last exacerbation requiring ER visit was over 5 years ago.  This had been managed by Dr. 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  Any records that the patient brought with him today were reviewed personally by me during the visit today and will be scanned into the chart for posterity.    Patient Instructions   1.  Continue medications and supplements - as listed.    2.  Continue well-balanced diet - low in calories and low in added sugar.    3.  Maintain a routine exercise program - every week.      Return in about 6 weeks (around 6/7/2019) for Medicare Wellness.    Ambulatory progress note signed and attested to by Quentin Solares D.O. on 4/26/2019 at 11:32.

## 2019-05-14 ENCOUNTER — TELEPHONE (OUTPATIENT)
Dept: FAMILY MEDICINE CLINIC | Facility: CLINIC | Age: 84
End: 2019-05-14

## 2019-05-14 NOTE — TELEPHONE ENCOUNTER
Pt called about Advair , pt states that [facility] spoke with [facility] and they do not have the prescription

## 2019-05-17 ENCOUNTER — LAB (OUTPATIENT)
Dept: LAB | Facility: HOSPITAL | Age: 84
End: 2019-05-17

## 2019-05-17 DIAGNOSIS — E78.5 HYPERLIPIDEMIA, UNSPECIFIED HYPERLIPIDEMIA TYPE: ICD-10-CM

## 2019-05-17 LAB
ALBUMIN SERPL-MCNC: 3.5 G/DL (ref 3.5–5.2)
ALBUMIN/GLOB SERPL: 1 G/DL
ALP SERPL-CCNC: 80 U/L (ref 39–117)
ALT SERPL W P-5'-P-CCNC: 13 U/L (ref 1–41)
ANION GAP SERPL CALCULATED.3IONS-SCNC: 10.8 MMOL/L
AST SERPL-CCNC: 19 U/L (ref 1–40)
BILIRUB SERPL-MCNC: 1.3 MG/DL (ref 0.2–1.2)
BUN BLD-MCNC: 12 MG/DL (ref 8–23)
BUN/CREAT SERPL: 15.4 (ref 7–25)
CALCIUM SPEC-SCNC: 9.3 MG/DL (ref 8.6–10.5)
CHLORIDE SERPL-SCNC: 106 MMOL/L (ref 98–107)
CHOLEST SERPL-MCNC: 182 MG/DL (ref 0–200)
CO2 SERPL-SCNC: 25.2 MMOL/L (ref 22–29)
CREAT BLD-MCNC: 0.78 MG/DL (ref 0.76–1.27)
GFR SERPL CREATININE-BSD FRML MDRD: 95 ML/MIN/1.73
GLOBULIN UR ELPH-MCNC: 3.5 GM/DL
GLUCOSE BLD-MCNC: 95 MG/DL (ref 65–99)
HDLC SERPL-MCNC: 64 MG/DL (ref 40–60)
LDLC SERPL CALC-MCNC: 107 MG/DL (ref 0–100)
LDLC/HDLC SERPL: 1.68 {RATIO}
POTASSIUM BLD-SCNC: 4.3 MMOL/L (ref 3.5–5.2)
PROT SERPL-MCNC: 7 G/DL (ref 6–8.5)
SODIUM BLD-SCNC: 142 MMOL/L (ref 136–145)
TRIGL SERPL-MCNC: 53 MG/DL (ref 0–150)
VLDLC SERPL-MCNC: 10.6 MG/DL (ref 5–40)

## 2019-05-17 PROCEDURE — 80053 COMPREHEN METABOLIC PANEL: CPT | Performed by: FAMILY MEDICINE

## 2019-05-17 PROCEDURE — 80061 LIPID PANEL: CPT | Performed by: FAMILY MEDICINE

## 2019-05-22 ENCOUNTER — OFFICE VISIT (OUTPATIENT)
Dept: FAMILY MEDICINE CLINIC | Facility: CLINIC | Age: 84
End: 2019-05-22

## 2019-05-22 VITALS
DIASTOLIC BLOOD PRESSURE: 80 MMHG | HEIGHT: 69 IN | HEART RATE: 61 BPM | SYSTOLIC BLOOD PRESSURE: 120 MMHG | BODY MASS INDEX: 22.51 KG/M2 | OXYGEN SATURATION: 94 % | WEIGHT: 152 LBS

## 2019-05-22 DIAGNOSIS — H91.93 BILATERAL HEARING LOSS, UNSPECIFIED HEARING LOSS TYPE: ICD-10-CM

## 2019-05-22 DIAGNOSIS — H61.23 BILATERAL IMPACTED CERUMEN: Primary | ICD-10-CM

## 2019-05-22 PROCEDURE — 99213 OFFICE O/P EST LOW 20 MIN: CPT | Performed by: PHYSICIAN ASSISTANT

## 2019-05-22 PROCEDURE — 69209 REMOVE IMPACTED EAR WAX UNI: CPT | Performed by: PHYSICIAN ASSISTANT

## 2019-05-22 RX ORDER — BLOOD SUGAR DIAGNOSTIC
1 STRIP MISCELLANEOUS DAILY
COMMUNITY
End: 2021-01-01

## 2019-05-22 RX ORDER — AZELASTINE HYDROCHLORIDE, FLUTICASONE PROPIONATE 137; 50 UG/1; UG/1
1 SPRAY, METERED NASAL 2 TIMES DAILY PRN
COMMUNITY

## 2019-05-22 NOTE — PATIENT INSTRUCTIONS
-Use Debrox eardrops over-the-counter in your right ear per package instructions for the next 3 days

## 2019-05-22 NOTE — PROGRESS NOTES
"    Chief Complaint   Patient presents with   • Cerumen Impaction     needs to have ears cleaned rt ear       HPI     Negrito Joe is a pleasant 84 y.o. male who presents for evaluation of \"chief complaint.\" Patient states it has been hard to ear out of his right ear for the last 2 weeks. Had his hearing aid adjusted when a technician commented on significant wax. He would like to have this removed today. He denies ear pain, tinnitus, fever, or chills.     Past Medical History:   Diagnosis Date   • Asthma        Past Surgical History:   Procedure Laterality Date   • COLONOSCOPY     • HERNIA REPAIR      1982 & 2014   • SINUS SURGERY      Multiple       Family History   Problem Relation Age of Onset   • Aneurysm Father    • Heart attack Father    • Cancer Other        Social History     Socioeconomic History   • Marital status:      Spouse name: Not on file   • Number of children: Not on file   • Years of education: Not on file   • Highest education level: Not on file   Tobacco Use   • Smoking status: Never Smoker   • Smokeless tobacco: Never Used   Substance and Sexual Activity   • Alcohol use: Yes     Comment: occ- 4x/monthly   • Drug use: No       Allergies   Allergen Reactions   • Bactrim [Sulfamethoxazole-Trimethoprim]        ROS    Review of Systems   Constitutional: Negative for chills and fever.   HENT: Positive for hearing loss. Negative for ear pain and tinnitus.        Vitals:    05/22/19 1122   BP: 120/80   Pulse: 61   SpO2: 94%         Current Outpatient Medications:   •  ADVAIR DISKUS 250-50 MCG/DOSE DISKUS, Inhale 1 puff 2 (Two) Times a Day., Disp: 60 each, Rfl: 5  •  albuterol (PROVENTIL HFA;VENTOLIN HFA) 108 (90 BASE) MCG/ACT inhaler, Inhale 2 puffs Every 4 (Four) Hours As Needed for Wheezing., Disp: , Rfl:   •  Azelastine-Fluticasone 137-50 MCG/ACT suspension, Inhale 1 spray Daily., Disp: , Rfl:   •  B-D INS SYR ULTRAFINE 1CC/31G 31G X 5/16\" 1 ML misc, , Disp: , Rfl:   •  CAVERJECT 20 MCG " "reconstituted solution, , Disp: , Rfl:   •  fluticasone (FLONASE) 50 MCG/ACT nasal spray, instill 1 spray into each nostril twice a day prn, Disp: , Rfl: 1  •  Insulin Syringe-Needle U-100 (BD INSULIN SYRINGE U/F) 31G X 5/16\" 0.3 ML misc, 1 each by Other route Daily., Disp: , Rfl:   •  MUCINEX D  MG per 12 hr tablet, take 1 tablet by mouth every morning prn, Disp: , Rfl: 0  •  Multiple Vitamins-Minerals (PRESERVISION AREDS PO), Take 1 tablet by mouth 2 (Two) Times a Day., Disp: , Rfl:   •  pantoprazole (PROTONIX) 40 MG EC tablet, take 1 tablet by mouth once daily, Disp: , Rfl: 0  •  pravastatin (PRAVACHOL) 40 MG tablet, , Disp: , Rfl:   •  RAPAFLO 8 MG capsule capsule, , Disp: , Rfl:   •  SPIRIVA HANDIHALER 18 MCG per inhalation capsule, , Disp: , Rfl:   •  tamsulosin (FLOMAX) 0.4 MG capsule 24 hr capsule, Take 1 capsule by mouth Every Night., Disp: , Rfl:   •  Tiotropium Bromide Monohydrate (SPIRIVA RESPIMAT) 1.25 MCG/ACT aerosol solution inhaler, Inhale 1 spray Daily., Disp: , Rfl:     PE    Physical Exam   Constitutional: He appears well-developed and well-nourished. No distress.   Hard of hearing   HENT:   Right Ear: cerumen impaction is present.  Left Ear: An impacted cerumen is present.  After bilateral ear lavage, left EAC is free of wax. Some hardened right-sided wax remains.         A/P    Problem List Items Addressed This Visit     None      Visit Diagnoses     Bilateral impacted cerumen    -  Primary  -He has some remaining right-sided wax that was unable to be removed today after ear lavage.  Recommended Debrox drops OTC for 2 to 3 days then returning for nurse visit on Friday for removal      Bilateral hearing loss, unspecified hearing loss type              Plan of care reviewed with patient at the conclusion of today's visit. Education was provided regarding diagnosis, management and any prescribed or recommended OTC medications.  Patient verbalizes understanding of and agreement with management " plan.        [facility] [place]

## 2019-05-24 ENCOUNTER — CLINICAL SUPPORT (OUTPATIENT)
Dept: FAMILY MEDICINE CLINIC | Facility: CLINIC | Age: 84
End: 2019-05-24

## 2019-05-24 DIAGNOSIS — H61.23 BILATERAL IMPACTED CERUMEN: Primary | ICD-10-CM

## 2020-01-01 ENCOUNTER — OFFICE VISIT (OUTPATIENT)
Dept: ONCOLOGY | Facility: CLINIC | Age: 85
End: 2020-01-01

## 2020-01-01 ENCOUNTER — LAB (OUTPATIENT)
Dept: LAB | Facility: HOSPITAL | Age: 85
End: 2020-01-01

## 2020-01-01 ENCOUNTER — CONSULT (OUTPATIENT)
Dept: ONCOLOGY | Facility: CLINIC | Age: 85
End: 2020-01-01

## 2020-01-01 VITALS — BODY MASS INDEX: 22.89 KG/M2 | HEIGHT: 69 IN

## 2020-01-01 VITALS
OXYGEN SATURATION: 96 % | BODY MASS INDEX: 22.96 KG/M2 | DIASTOLIC BLOOD PRESSURE: 46 MMHG | WEIGHT: 155 LBS | HEART RATE: 78 BPM | SYSTOLIC BLOOD PRESSURE: 105 MMHG | HEIGHT: 69 IN | RESPIRATION RATE: 16 BRPM | TEMPERATURE: 97.2 F

## 2020-01-01 DIAGNOSIS — D47.2 MONOCLONAL GAMMOPATHY: ICD-10-CM

## 2020-01-01 DIAGNOSIS — D53.9 MACROCYTIC ANEMIA: Primary | Chronic | ICD-10-CM

## 2020-01-01 DIAGNOSIS — D53.9 MACROCYTIC ANEMIA: ICD-10-CM

## 2020-01-01 DIAGNOSIS — D47.2 MONOCLONAL GAMMOPATHY: Chronic | ICD-10-CM

## 2020-01-01 LAB
ERYTHROCYTE [DISTWIDTH] IN BLOOD BY AUTOMATED COUNT: 14.2 % (ref 12.3–15.4)
HCT VFR BLD AUTO: 33.9 % (ref 37.5–51)
HGB BLD-MCNC: 11.1 G/DL (ref 13–17.7)
LYMPHOCYTES # BLD AUTO: 2.3 10*3/MM3 (ref 0.7–3.1)
LYMPHOCYTES NFR BLD AUTO: 27.4 % (ref 19.6–45.3)
MCH RBC QN AUTO: 32.7 PG (ref 26.6–33)
MCHC RBC AUTO-ENTMCNC: 32.6 G/DL (ref 31.5–35.7)
MCV RBC AUTO: 100.2 FL (ref 79–97)
MONOCYTES # BLD AUTO: 0.4 10*3/MM3 (ref 0.1–0.9)
MONOCYTES NFR BLD AUTO: 5.2 % (ref 5–12)
NEUTROPHILS NFR BLD AUTO: 5.6 10*3/MM3 (ref 1.7–7)
NEUTROPHILS NFR BLD AUTO: 67.4 % (ref 42.7–76)
PLATELET # BLD AUTO: 410 10*3/MM3 (ref 140–450)
PMV BLD AUTO: 6.1 FL (ref 6–12)
RBC # BLD AUTO: 3.39 10*6/MM3 (ref 4.14–5.8)
WBC # BLD AUTO: 8.3 10*3/MM3 (ref 3.4–10.8)

## 2020-01-01 PROCEDURE — 36415 COLL VENOUS BLD VENIPUNCTURE: CPT

## 2020-01-01 PROCEDURE — 99442 PR PHYS/QHP TELEPHONE EVALUATION 11-20 MIN: CPT | Performed by: INTERNAL MEDICINE

## 2020-01-01 PROCEDURE — 85025 COMPLETE CBC W/AUTO DIFF WBC: CPT

## 2020-01-01 PROCEDURE — 99205 OFFICE O/P NEW HI 60 MIN: CPT | Performed by: INTERNAL MEDICINE

## 2020-01-01 RX ORDER — DONEPEZIL HYDROCHLORIDE 10 MG/1
10 TABLET, FILM COATED ORAL NIGHTLY
COMMUNITY
Start: 2020-01-01

## 2020-01-01 RX ORDER — INFLUENZA A VIRUS A/MICHIGAN/45/2015 X-275 (H1N1) ANTIGEN (FORMALDEHYDE INACTIVATED), INFLUENZA A VIRUS A/SINGAPORE/INFIMH-16-0019/2016 IVR-186 (H3N2) ANTIGEN (FORMALDEHYDE INACTIVATED), INFLUENZA B VIRUS B/PHUKET/3073/2013 ANTIGEN (FORMALDEHYDE INACTIVATED), AND INFLUENZA B VIRUS B/MARYLAND/15/2016 BX-69A ANTIGEN (FORMALDEHYDE INACTIVATED) 60; 60; 60; 60 UG/.7ML; UG/.7ML; UG/.7ML; UG/.7ML
INJECTION, SUSPENSION INTRAMUSCULAR
COMMUNITY
Start: 2020-01-01 | End: 2021-01-01

## 2020-10-22 PROBLEM — D47.2 MONOCLONAL GAMMOPATHY: Status: ACTIVE | Noted: 2020-01-01

## 2020-10-22 PROBLEM — D53.9 MACROCYTIC ANEMIA: Chronic | Status: ACTIVE | Noted: 2017-04-08

## 2020-10-22 PROBLEM — D47.2 MONOCLONAL GAMMOPATHY: Chronic | Status: ACTIVE | Noted: 2020-01-01

## 2020-10-22 NOTE — PROGRESS NOTES
CHIEF COMPLAINT: Monoclonal gammopathy    REASON FOR REFERRAL: Same      RECORDS OBTAINED  Records of the patients history including those obtained from Dr. Astudillo were reviewed and summarized in detail.    Oncology/Hematology History Overview Note   1.  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  Monoclonal gammopathy  3.  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  4.  Asthma      -4/10/2017 initial [facility] hematology consultation: For macrocytic anemia with normal B12 and folate and found to have cold hemolytic anemia.  B12 high at 1947 with folate high 24.  Normal methylmalonic acid.  Transferrin saturation normal at 46%.  LDH normal.  Haptoglobin less than 10 with reticulocyte count of 4% and a cold antibody was found with slightly elevated direct bilirubin but normal total bilirubin and indirect bilirubin.  Peripheral smear showed occasional fragmented red cells, hypersegmented neutrophils, and reactive lymphocytes.  Last saw him 8/29/2017 for his cold hemolytic anemia for which we recommended keeping himself warm generally not a steroid sensitive because of hemolysis.  Taking folic acid and B12 for increased demand though not frankly deficient.  Saw my nurse practitioner 1/16/2018 with normal CBC.    -9/23/2020 data from Dr. Astudillo retic count 3.3% with ferritin 163, B12 802, iron 68, M spike 900 mg/dL    -10/22/2020 [facility] hematology oncology consult: It is been over 2 years since his last visit and this is for a new indication of monoclonal gammopathy.  At the age of 86 with multiple comorbidities I am not sure that even if he had a plasma cell dyscrasia that he would want therapy.  If he does not have hypercalcemia, anemia due to marrow involvement (i.e. not due to his cold agglutinins), creatinine approaching 2, or lytic bone disease (previous total protein and alkaline phosphatase have been normal), then would  "be unlikely to treat even if he had this.  Monoclonal gammopathy is fairly prevalent cold hemolytic anemias.  As with any monoclonal gammopathy, transformation to myeloma is possible but whether transformation to myeloma is associated with a cold agglutinin disease or just associated with the cold agglutinin disease is uncertain.  This can be 2 distinct separate processes.       Macrocytic anemia    Initial Diagnosis    Macrocytic anemia     4/17/2017 Procedure    EGD      5/9/2017 Procedure    Colonoscopy         HISTORY OF PRESENT ILLNESS:  The patient is a 86 y.o.  male, referred for monoclonal gammopathy with history of cold agglutinins and asthma    REVIEW OF SYSTEMS:  A 14 point review of systems was performed and is negative except as noted above.    Past Medical History:   Diagnosis Date   • Asthma      Past Surgical History:   Procedure Laterality Date   • COLONOSCOPY     • HERNIA REPAIR      1982 & 2014   • SINUS SURGERY      Multiple       Current Outpatient Medications on File Prior to Visit   Medication Sig Dispense Refill   • pantoprazole (PROTONIX) 40 MG EC tablet take 1 tablet by mouth once daily  0   • pravastatin (PRAVACHOL) 40 MG tablet      • RAPAFLO 8 MG capsule capsule      • SPIRIVA HANDIHALER 18 MCG per inhalation capsule      • tamsulosin (FLOMAX) 0.4 MG capsule 24 hr capsule Take 1 capsule by mouth Every Night.     • Tiotropium Bromide Monohydrate (SPIRIVA RESPIMAT) 1.25 MCG/ACT aerosol solution inhaler Inhale 1 spray Daily.     • ADVAIR DISKUS 250-50 MCG/DOSE DISKUS USE 1 INHALATION TWICE A DAY 60 each 5   • albuterol (PROVENTIL HFA;VENTOLIN HFA) 108 (90 BASE) MCG/ACT inhaler Inhale 2 puffs Every 4 (Four) Hours As Needed for Wheezing.     • Azelastine-Fluticasone 137-50 MCG/ACT suspension Inhale 1 spray Daily.     • B-D INS SYR ULTRAFINE 1CC/31G 31G X 5/16\" 1 ML misc      • CAVERJECT 20 MCG reconstituted solution      • fluticasone (FLONASE) 50 MCG/ACT nasal spray instill 1 spray into each " "nostril twice a day prn  1   • Insulin Syringe-Needle U-100 (BD INSULIN SYRINGE U/F) 31G X 5/16\" 0.3 ML misc 1 each by Other route Daily.     • MUCINEX D  MG per 12 hr tablet take 1 tablet by mouth every morning prn  0   • Multiple Vitamins-Minerals (PRESERVISION AREDS PO) Take 1 tablet by mouth 2 (Two) Times a Day.       No current facility-administered medications on file prior to visit.        Allergies   Allergen Reactions   • Bactrim [Sulfamethoxazole-Trimethoprim]    • Tetracycline Rash       Social History     Socioeconomic History   • Marital status:      Spouse name: Not on file   • Number of children: Not on file   • Years of education: Not on file   • Highest education level: Not on file   Tobacco Use   • Smoking status: Never Smoker   • Smokeless tobacco: Never Used   Substance and Sexual Activity   • Alcohol use: Yes     Comment: occ- 4x/monthly   • Drug use: No       Family History   Problem Relation Age of Onset   • Aneurysm Father    • Heart attack Father    • Cancer Other        PHYSICAL EXAM:    /46   Pulse 78   Temp 97.2 °F (36.2 °C)   Resp 16   Ht 175.3 cm (69\")   Wt 70.3 kg (155 lb)   SpO2 96%   BMI 22.89 kg/m²     ECOG: (2) Ambulatory & Capable of Self Care, Unable to Carry Out Work Activity, Up & About Greater Than 50% of Waking Hours  General: well appearing male in no acute distress  HEENT: sclera anicteric, oropharynx clear  Lymphatics: no cervical, supraclavicular, inguinal, or axillary adenopathy  Cardiovascular: regular rate and rhythm, no murmurs  Neck: Supple; No thyromegaly  Lungs: clear to auscultation bilaterally. No respiratory distress.   Abdomen: soft, nontender, nondistended.  No palpable organomegaly  Extremities: no cyanosis, clubbing, edema, or cords  Skin: no rashes, lesions, bruising, or petechiae  Neuro: Alert and oriented x 4; Moving all extremities.  Some decreased short-term memory  Psych: No anxiety or depression    Lab Results   Component " Value Date    HGB 11.7 (L) 01/16/2018    HCT 36.3 (L) 01/16/2018    .9 (H) 01/16/2018     01/16/2018    WBC 7.90 01/16/2018    NEUTROABS 4.60 01/16/2018    LYMPHSABS 2.60 01/16/2018    MONOSABS 0.70 01/16/2018    EOSABS 0.47 (H) 07/20/2017    BASOSABS 0.05 07/20/2017     Lab Results   Component Value Date    GLUCOSE 95 05/17/2019    BUN 12 05/17/2019    CREATININE 0.78 05/17/2019     05/17/2019    K 4.3 05/17/2019     05/17/2019    CO2 25.2 05/17/2019    CALCIUM 9.3 05/17/2019    PROTEINTOT 7.0 05/17/2019    ALBUMIN 3.50 05/17/2019    BILITOT 1.3 (H) 05/17/2019    ALKPHOS 80 05/17/2019    AST 19 05/17/2019    ALT 13 05/17/2019           Assessment/Plan   1.  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  Monoclonal gammopathy  3.  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  4.  Asthma      -4/10/2017 initial [facility] hematology consultation: For macrocytic anemia with normal B12 and folate and found to have cold hemolytic anemia.  B12 high at 1947 with folate high 24.  Normal methylmalonic acid.  Transferrin saturation normal at 46%.  LDH normal.  Haptoglobin less than 10 with reticulocyte count of 4% and a cold antibody was found with slightly elevated direct bilirubin but normal total bilirubin and indirect bilirubin.  Peripheral smear showed occasional fragmented red cells, hypersegmented neutrophils, and reactive lymphocytes.  Last saw him 8/29/2017 for his cold hemolytic anemia for which we recommended keeping himself warm generally not a steroid sensitive because of hemolysis.  Taking folic acid and B12 for increased demand though not frankly deficient.  Saw my nurse practitioner 1/16/2018 with normal CBC.    -9/23/2020 data from Dr. Astudillo retic count 3.3% with ferritin 163, B12 802, iron 68, M spike 900 mg/dL    -10/22/2020 [facility] hematology oncology consult: It is been over 2 years since  "his last visit and this is for a new indication of monoclonal gammopathy.  At the age of 86 with multiple comorbidities I am not sure that even if he had a plasma cell dyscrasia that he would want therapy.  If he does not have hypercalcemia, anemia due to marrow involvement (i.e. not due to his cold agglutinins), creatinine approaching 2, or lytic bone disease (previous total protein and alkaline phosphatase have been normal), then would be unlikely to treat even if he had this.  Monoclonal gammopathy is fairly prevalent cold hemolytic anemias.  As with any monoclonal gammopathy, transformation to myeloma is possible but whether transformation to myeloma is associated with a cold agglutinin disease or just associated with the cold agglutinin disease is uncertain.  This can be 2 distinct separate processes.  I discussed all this with his daughter who is a  and medically sophisticated as well as with the patient.  Even if we were to find a lymphoplasmacytic lymphoma or other plasma cell dyscrasia such as myeloma, he is not sure at the age of 86 he would put up with even steroids and Revlimid which would be the minimum therapy.  To that end, I will just check his CBC and CMP and as long as he is not hypercalcemic, anemic apart from his cold agglutinins, renal insufficient pressing towards a creatinine of 2, and has normal alkaline phosphatase and no bone symptoms (he is presently completely asymptomatic from this monoclonal gammopathy and typically would do a bone survey but would not put him through that either at this age), then all concerned would prefer we go conservatively here and I would not even repeat his serum protein electrophoresis much less his myeloma panel as we are unlikely to act on this in the absence of his\" CRAB\" criteria.  In the absence of these criteria I am not sure I would even repeat his serum protein electrophoresis ever again.  I will do a phone visit with him in the weeks ahead " to go over the CBC and CMP I am drawn today.  Discussed with patient face-to-face 1 hour greater than 50% spent counseling regarding this plan as outlined above.        Nam Hurd MD    10/22/2020

## 2020-10-22 NOTE — ACP (ADVANCE CARE PLANNING)
Advance Care Planning   ACP discussion was held with the patient during this visit. Patient has an advance directive (not in EMR), copy requested.

## 2020-11-05 NOTE — PROGRESS NOTES
This visit has been rescheduled as a phone visit to comply with patient safety concerns in accordance with CDC recommendations. Total time of discussion was 15 minutes.  Verbal consent given      CHIEF COMPLAINT: Macrocytic anemia with monoclonal gammopathy    Problem List:  Oncology/Hematology History Overview Note   1.  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  Monoclonal gammopathy  3.  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  4.  Asthma      -4/10/2017 initial [facility] hematology consultation: For macrocytic anemia with normal B12 and folate and found to have cold hemolytic anemia.  B12 high at 1947 with folate high 24.  Normal methylmalonic acid.  Transferrin saturation normal at 46%.  LDH normal.  Haptoglobin less than 10 with reticulocyte count of 4% and a cold antibody was found with slightly elevated direct bilirubin but normal total bilirubin and indirect bilirubin.  Peripheral smear showed occasional fragmented red cells, hypersegmented neutrophils, and reactive lymphocytes.  Last saw him 8/29/2017 for his cold hemolytic anemia for which we recommended keeping himself warm generally not a steroid sensitive because of hemolysis.  Taking folic acid and B12 for increased demand though not frankly deficient.  Saw my nurse practitioner 1/16/2018 with normal CBC.    -9/23/2020 data from Dr. Astudillo retic count 3.3% with ferritin 163, B12 802, iron 68, M spike 900 mg/dL    -10/22/2020 [facility] hematology oncology consult: It is been over 2 years since his last visit and this is for a new indication of monoclonal gammopathy.  At the age of 86 with multiple comorbidities I am not sure that even if he had a plasma cell dyscrasia that he would want therapy.  If he does not have hypercalcemia, anemia due to marrow involvement (i.e. not due to his cold agglutinins), creatinine approaching 2, or lytic bone disease  "(previous total protein and alkaline phosphatase have been normal), then would be unlikely to treat even if he had this.  Monoclonal gammopathy is fairly prevalent cold hemolytic anemias.  As with any monoclonal gammopathy, transformation to myeloma is possible but whether transformation to myeloma is associated with a cold agglutinin disease or just associated with the cold agglutinin disease is uncertain.  This can be 2 distinct separate processes.  I discussed all this with his daughter who is a  and medically sophisticated as well as with the patient.  Even if we were to find a lymphoplasmacytic lymphoma or other plasma cell dyscrasia such as myeloma, he is not sure at the age of 86 he would put up with even steroids and Revlimid which would be the minimum therapy.  To that end, I will just check his CBC and CMP and as long as he is not hypercalcemic, anemic apart from his cold agglutinins, renal insufficient pressing towards a creatinine of 2, and has normal alkaline phosphatase and no bone symptoms (he is presently completely asymptomatic from this monoclonal gammopathy and typically would do a bone survey but would not put him through that either at this age), then all concerned would prefer we go conservatively here and I would not even repeat his serum protein electrophoresis much less his myeloma panel as we are unlikely to act on this in the absence of his\" CRAB\" criteria.  In the absence of these criteria I am not sure I would even repeat his serum protein electrophoresis ever again.  I will do a phone visit with him in the weeks ahead to go over the CBC and CMP I am drawn today.    -11/5/2020 [facility] hematology oncology follow-up visit: His daughter gives me results of 8/26/2020 CMP.  Calcium 9.5.  Creatinine 0.96.  Alkaline phosphatase 78.  Bilirubin 1.4.  Total protein 6.7.  Albumin 3.9.  Hemoglobin 11.1 with .2 with normal white count, platelet count, and differential.  Hence, he " has no CRAB criteria to suggest full-blown myeloma.  I have not done a bone survey and would not do so at this point with a modest monoclonal gammopathy and 86-year-old with other infirmities.  It would be unusual for significant bony involvement to occur with such a low alkaline phosphatase however.  His bilirubin is slightly elevated commensurate with his compensated hemolytic anemia that is chronic and not warm but is likely cold agglutinin related and he knows to stay warm.  The total protein to albumin ratio being less than 2 makes the likelihood for excessive extra proteins such as monoclonal gammopathy is that are of significance to be unlikely.  He has no hypercalcemia or renal dysfunction.  For now he is just going to follow with Dr. Astudillo and I would not suggest doing any further monoclonal gammopathy testing in this 86-year-old unless his creatinine dario significantly, total calcium went over 11, total protein to albumin ratio went well over 2, or his alkaline phosphatase dramatically dario or if he develops new symptoms.  If his hemoglobin drops, it may well be that it is his hemolytic process and not necessarily lurking myeloma.  I will see him on an as-needed basis.       Macrocytic anemia    Initial Diagnosis    Macrocytic anemia     4/17/2017 Procedure    EGD      5/9/2017 Procedure    Colonoscopy         HISTORY OF PRESENT ILLNESS:  The patient is a 86 y.o. male, here for follow up on management of macrocytic anemia with monoclonal gammopathy      Past Medical History:   Diagnosis Date   • Asthma      Past Surgical History:   Procedure Laterality Date   • COLONOSCOPY     • HERNIA REPAIR      1982 & 2014   • SINUS SURGERY      Multiple       Allergies   Allergen Reactions   • Bactrim [Sulfamethoxazole-Trimethoprim]    • Tetracycline Rash       Family History and Social History reviewed and changed as necessary      REVIEW OF SYSTEM:   Review of Systems   Constitutional: Negative for appetite change,  "chills, diaphoresis, fatigue, fever and unexpected weight change.   HENT:   Negative for mouth sores, sore throat and trouble swallowing.    Eyes: Negative for icterus.   Respiratory: Negative for cough, hemoptysis and shortness of breath.    Cardiovascular: Negative for chest pain, leg swelling and palpitations.   Gastrointestinal: Negative for abdominal distention, abdominal pain, blood in stool, constipation, diarrhea, nausea and vomiting.   Endocrine: Negative for hot flashes.   Genitourinary: Negative for bladder incontinence, difficulty urinating, dysuria, frequency and hematuria.    Musculoskeletal: Negative for gait problem, neck pain and neck stiffness.   Skin: Negative for rash.   Neurological: Negative for dizziness, gait problem, headaches, light-headedness and numbness.   Hematological: Negative for adenopathy. Does not bruise/bleed easily.   Psychiatric/Behavioral: Negative for depression. The patient is not nervous/anxious.    All other systems reviewed and are negative.       PHYSICAL EXAM    Vitals:    11/05/20 0811   Height: 175.3 cm (69\")     Vitals:    11/05/20 0811   PainSc: 0-No pain     Phone visit    Lab Results   Component Value Date    HGB 11.1 (L) 10/22/2020    HCT 33.9 (L) 10/22/2020    .2 (H) 10/22/2020     10/22/2020    WBC 8.30 10/22/2020    NEUTROABS 5.60 10/22/2020    LYMPHSABS 2.30 10/22/2020    MONOSABS 0.40 10/22/2020    EOSABS 0.47 (H) 07/20/2017    BASOSABS 0.05 07/20/2017       Lab Results   Component Value Date    GLUCOSE 95 05/17/2019    BUN 12 05/17/2019    CREATININE 0.78 05/17/2019     05/17/2019    K 4.3 05/17/2019     05/17/2019    CO2 25.2 05/17/2019    CALCIUM 9.3 05/17/2019    PROTEINTOT 7.0 05/17/2019    ALBUMIN 3.50 05/17/2019    BILITOT 1.3 (H) 05/17/2019    ALKPHOS 80 05/17/2019    AST 19 05/17/2019    ALT 13 05/17/2019                   ASSESSMENT & PLAN:  1.  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  " Monoclonal gammopathy  3.  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  4.  Asthma      -4/10/2017 initial [facility] hematology consultation: For macrocytic anemia with normal B12 and folate and found to have cold hemolytic anemia.  B12 high at 1947 with folate high 24.  Normal methylmalonic acid.  Transferrin saturation normal at 46%.  LDH normal.  Haptoglobin less than 10 with reticulocyte count of 4% and a cold antibody was found with slightly elevated direct bilirubin but normal total bilirubin and indirect bilirubin.  Peripheral smear showed occasional fragmented red cells, hypersegmented neutrophils, and reactive lymphocytes.  Last saw him 8/29/2017 for his cold hemolytic anemia for which we recommended keeping himself warm generally not a steroid sensitive because of hemolysis.  Taking folic acid and B12 for increased demand though not frankly deficient.  Saw my nurse practitioner 1/16/2018 with normal CBC.    -9/23/2020 data from Dr. Astudillo retic count 3.3% with ferritin 163, B12 802, iron 68, M spike 900 mg/dL    -10/22/2020 [facility] hematology oncology consult: It is been over 2 years since his last visit and this is for a new indication of monoclonal gammopathy.  At the age of 86 with multiple comorbidities I am not sure that even if he had a plasma cell dyscrasia that he would want therapy.  If he does not have hypercalcemia, anemia due to marrow involvement (i.e. not due to his cold agglutinins), creatinine approaching 2, or lytic bone disease (previous total protein and alkaline phosphatase have been normal), then would be unlikely to treat even if he had this.  Monoclonal gammopathy is fairly prevalent cold hemolytic anemias.  As with any monoclonal gammopathy, transformation to myeloma is possible but whether transformation to myeloma is associated with a cold agglutinin disease or just associated with the cold agglutinin disease is  "uncertain.  This can be 2 distinct separate processes.  I discussed all this with his daughter who is a  and medically sophisticated as well as with the patient.  Even if we were to find a lymphoplasmacytic lymphoma or other plasma cell dyscrasia such as myeloma, he is not sure at the age of 86 he would put up with even steroids and Revlimid which would be the minimum therapy.  To that end, I will just check his CBC and CMP and as long as he is not hypercalcemic, anemic apart from his cold agglutinins, renal insufficient pressing towards a creatinine of 2, and has normal alkaline phosphatase and no bone symptoms (he is presently completely asymptomatic from this monoclonal gammopathy and typically would do a bone survey but would not put him through that either at this age), then all concerned would prefer we go conservatively here and I would not even repeat his serum protein electrophoresis much less his myeloma panel as we are unlikely to act on this in the absence of his\" CRAB\" criteria.  In the absence of these criteria I am not sure I would even repeat his serum protein electrophoresis ever again.  I will do a phone visit with him in the weeks ahead to go over the CBC and CMP I am drawn today.    -11/5/2020 [facility] hematology oncology follow-up visit: His daughter gives me results of 8/26/2020 CMP.  Calcium 9.5.  Creatinine 0.96.  Alkaline phosphatase 78.  Bilirubin 1.4.  Total protein 6.7.  Albumin 3.9.  Hemoglobin 11.1 with .2 with normal white count, platelet count, and differential.  Hence, he has no CRAB criteria to suggest full-blown myeloma.  I have not done a bone survey and would not do so at this point with a modest monoclonal gammopathy and 86-year-old with other infirmities.  It would be unusual for significant bony involvement to occur with such a low alkaline phosphatase however.  His bilirubin is slightly elevated commensurate with his compensated hemolytic anemia that is " chronic and not warm but is likely cold agglutinin related and he knows to stay warm.  The total protein to albumin ratio being less than 2 makes the likelihood for excessive extra proteins such as monoclonal gammopathy is that are of significance to be unlikely.  He has no hypercalcemia or renal dysfunction.  For now he is just going to follow with Dr. Astudillo and I would not suggest doing any further monoclonal gammopathy testing in this 86-year-old unless his creatinine dario significantly, total calcium went over 11, total protein to albumin ratio went well over 2, or his alkaline phosphatase dramatically dario or if he develops new symptoms.  If his hemoglobin drops, it may well be that it is his hemolytic process and not necessarily lurking myeloma.  I will see him on an as-needed basis.        Nam Hurd MD    11/05/2020

## 2021-01-01 ENCOUNTER — APPOINTMENT (OUTPATIENT)
Dept: MRI IMAGING | Facility: HOSPITAL | Age: 86
End: 2021-01-01

## 2021-01-01 ENCOUNTER — APPOINTMENT (OUTPATIENT)
Dept: GENERAL RADIOLOGY | Facility: HOSPITAL | Age: 86
End: 2021-01-01

## 2021-01-01 ENCOUNTER — APPOINTMENT (OUTPATIENT)
Dept: CT IMAGING | Facility: HOSPITAL | Age: 86
End: 2021-01-01

## 2021-01-01 ENCOUNTER — HOSPITAL ENCOUNTER (INPATIENT)
Facility: HOSPITAL | Age: 86
LOS: 2 days | End: 2021-02-08
Attending: INTERNAL MEDICINE | Admitting: INTERNAL MEDICINE

## 2021-01-01 ENCOUNTER — HOSPITAL ENCOUNTER (INPATIENT)
Facility: HOSPITAL | Age: 86
LOS: 4 days | End: 2021-02-06
Attending: EMERGENCY MEDICINE | Admitting: HOSPITALIST

## 2021-01-01 ENCOUNTER — HOSPITAL ENCOUNTER (EMERGENCY)
Facility: HOSPITAL | Age: 86
Discharge: STILL ADMITTED OR ADMINISTRATIVE | End: 2021-02-02
Attending: EMERGENCY MEDICINE

## 2021-01-01 VITALS
TEMPERATURE: 98.2 F | RESPIRATION RATE: 18 BRPM | WEIGHT: 150.57 LBS | BODY MASS INDEX: 21.56 KG/M2 | HEART RATE: 95 BPM | HEIGHT: 70 IN | DIASTOLIC BLOOD PRESSURE: 66 MMHG | SYSTOLIC BLOOD PRESSURE: 137 MMHG | OXYGEN SATURATION: 95 %

## 2021-01-01 VITALS
WEIGHT: 150 LBS | RESPIRATION RATE: 16 BRPM | BODY MASS INDEX: 21.47 KG/M2 | OXYGEN SATURATION: 99 % | HEART RATE: 92 BPM | DIASTOLIC BLOOD PRESSURE: 41 MMHG | HEIGHT: 70 IN | TEMPERATURE: 96.6 F | SYSTOLIC BLOOD PRESSURE: 106 MMHG

## 2021-01-01 VITALS
OXYGEN SATURATION: 95 % | DIASTOLIC BLOOD PRESSURE: 65 MMHG | SYSTOLIC BLOOD PRESSURE: 121 MMHG | WEIGHT: 150.5 LBS | HEART RATE: 88 BPM | HEIGHT: 70 IN | BODY MASS INDEX: 21.55 KG/M2 | RESPIRATION RATE: 20 BRPM | TEMPERATURE: 98.3 F

## 2021-01-01 DIAGNOSIS — R41.82 ALTERED MENTAL STATUS, UNSPECIFIED ALTERED MENTAL STATUS TYPE: Primary | ICD-10-CM

## 2021-01-01 DIAGNOSIS — E86.0 DEHYDRATION: ICD-10-CM

## 2021-01-01 DIAGNOSIS — D64.9 SYMPTOMATIC ANEMIA: ICD-10-CM

## 2021-01-01 LAB
ABO GROUP BLD: NORMAL
ALBUMIN SERPL-MCNC: 2.9 G/DL (ref 3.5–5.2)
ALBUMIN SERPL-MCNC: 3.4 G/DL (ref 3.5–5.2)
ALBUMIN/GLOB SERPL: 1 G/DL
ALBUMIN/GLOB SERPL: 1.1 G/DL
ALP SERPL-CCNC: 71 U/L (ref 39–117)
ALP SERPL-CCNC: 84 U/L (ref 39–117)
ALT SERPL W P-5'-P-CCNC: 11 U/L (ref 1–41)
ALT SERPL W P-5'-P-CCNC: 14 U/L (ref 1–41)
ANION GAP SERPL CALCULATED.3IONS-SCNC: 4 MMOL/L (ref 5–15)
ANION GAP SERPL CALCULATED.3IONS-SCNC: 4 MMOL/L (ref 5–15)
ANION GAP SERPL CALCULATED.3IONS-SCNC: 6 MMOL/L (ref 5–15)
ANION GAP SERPL CALCULATED.3IONS-SCNC: 9 MMOL/L (ref 5–15)
ANION GAP SERPL CALCULATED.3IONS-SCNC: 9 MMOL/L (ref 5–15)
AST SERPL-CCNC: 19 U/L (ref 1–40)
AST SERPL-CCNC: 23 U/L (ref 1–40)
BACTERIA SPEC AEROBE CULT: NO GROWTH
BACTERIA SPEC AEROBE CULT: NORMAL
BACTERIA SPEC AEROBE CULT: NORMAL
BACTERIA UR QL AUTO: ABNORMAL /HPF
BASOPHILS # BLD AUTO: 0.04 10*3/MM3 (ref 0–0.2)
BASOPHILS # BLD AUTO: 0.04 10*3/MM3 (ref 0–0.2)
BASOPHILS # BLD AUTO: 0.05 10*3/MM3 (ref 0–0.2)
BASOPHILS # BLD AUTO: 0.06 10*3/MM3 (ref 0–0.2)
BASOPHILS NFR BLD AUTO: 0.3 % (ref 0–1.5)
BASOPHILS NFR BLD AUTO: 0.5 % (ref 0–1.5)
BASOPHILS NFR BLD AUTO: 0.5 % (ref 0–1.5)
BASOPHILS NFR BLD AUTO: 0.7 % (ref 0–1.5)
BH BB BLOOD EXPIRATION DATE: NORMAL
BH BB BLOOD EXPIRATION DATE: NORMAL
BH BB BLOOD TYPE BARCODE: 5100
BH BB BLOOD TYPE BARCODE: 5100
BH BB DISPENSE STATUS: NORMAL
BH BB DISPENSE STATUS: NORMAL
BH BB PRODUCT CODE: NORMAL
BH BB PRODUCT CODE: NORMAL
BH BB UNIT NUMBER: NORMAL
BH BB UNIT NUMBER: NORMAL
BILIRUB SERPL-MCNC: 2 MG/DL (ref 0–1.2)
BILIRUB SERPL-MCNC: 2.8 MG/DL (ref 0–1.2)
BILIRUB UR QL STRIP: NEGATIVE
BLD GP AB SCN SERPL QL: POSITIVE
BUN SERPL-MCNC: 15 MG/DL (ref 8–23)
BUN SERPL-MCNC: 18 MG/DL (ref 8–23)
BUN SERPL-MCNC: 25 MG/DL (ref 8–23)
BUN SERPL-MCNC: 28 MG/DL (ref 8–23)
BUN SERPL-MCNC: 34 MG/DL (ref 8–23)
BUN/CREAT SERPL: 12.2 (ref 7–25)
BUN/CREAT SERPL: 14.2 (ref 7–25)
BUN/CREAT SERPL: 17.5 (ref 7–25)
BUN/CREAT SERPL: 17.6 (ref 7–25)
BUN/CREAT SERPL: 19.5 (ref 7–25)
CA-I SERPL ISE-MCNC: 1.31 MMOL/L (ref 1.12–1.32)
CALCIUM SPEC-SCNC: 8.2 MG/DL (ref 8.6–10.5)
CALCIUM SPEC-SCNC: 8.3 MG/DL (ref 8.6–10.5)
CALCIUM SPEC-SCNC: 8.6 MG/DL (ref 8.6–10.5)
CALCIUM SPEC-SCNC: 8.6 MG/DL (ref 8.6–10.5)
CALCIUM SPEC-SCNC: 8.8 MG/DL (ref 8.6–10.5)
CHLORIDE SERPL-SCNC: 103 MMOL/L (ref 98–107)
CHLORIDE SERPL-SCNC: 109 MMOL/L (ref 98–107)
CHLORIDE SERPL-SCNC: 111 MMOL/L (ref 98–107)
CHLORIDE SERPL-SCNC: 111 MMOL/L (ref 98–107)
CHLORIDE SERPL-SCNC: 113 MMOL/L (ref 98–107)
CLARITY UR: ABNORMAL
CO2 SERPL-SCNC: 22 MMOL/L (ref 22–29)
CO2 SERPL-SCNC: 23 MMOL/L (ref 22–29)
CO2 SERPL-SCNC: 24 MMOL/L (ref 22–29)
COLD SCREEN: POSITIVE
COLOR UR: ABNORMAL
CREAT SERPL-MCNC: 1.23 MG/DL (ref 0.76–1.27)
CREAT SERPL-MCNC: 1.27 MG/DL (ref 0.76–1.27)
CREAT SERPL-MCNC: 1.43 MG/DL (ref 0.76–1.27)
CREAT SERPL-MCNC: 1.59 MG/DL (ref 0.76–1.27)
CREAT SERPL-MCNC: 1.74 MG/DL (ref 0.76–1.27)
CROSSMATCH INTERPRETATION: NORMAL
CROSSMATCH INTERPRETATION: NORMAL
D-LACTATE SERPL-SCNC: 0.9 MMOL/L (ref 0.5–2)
DEPRECATED RDW RBC AUTO: 55.1 FL (ref 37–54)
DEPRECATED RDW RBC AUTO: 56.9 FL (ref 37–54)
DEPRECATED RDW RBC AUTO: 57.1 FL (ref 37–54)
DEPRECATED RDW RBC AUTO: 58.9 FL (ref 37–54)
DEPRECATED RDW RBC AUTO: 59.6 FL (ref 37–54)
DEVELOPER EXPIRATION DATE: NORMAL
DEVELOPER LOT NUMBER: NORMAL
EOSINOPHIL # BLD AUTO: 0.07 10*3/MM3 (ref 0–0.4)
EOSINOPHIL # BLD AUTO: 0.07 10*3/MM3 (ref 0–0.4)
EOSINOPHIL # BLD AUTO: 0.32 10*3/MM3 (ref 0–0.4)
EOSINOPHIL # BLD AUTO: 0.42 10*3/MM3 (ref 0–0.4)
EOSINOPHIL NFR BLD AUTO: 0.6 % (ref 0.3–6.2)
EOSINOPHIL NFR BLD AUTO: 0.8 % (ref 0.3–6.2)
EOSINOPHIL NFR BLD AUTO: 3 % (ref 0.3–6.2)
EOSINOPHIL NFR BLD AUTO: 4.9 % (ref 0.3–6.2)
ERYTHROCYTE [DISTWIDTH] IN BLOOD BY AUTOMATED COUNT: 15.3 % (ref 12.3–15.4)
ERYTHROCYTE [DISTWIDTH] IN BLOOD BY AUTOMATED COUNT: 15.6 % (ref 12.3–15.4)
ERYTHROCYTE [DISTWIDTH] IN BLOOD BY AUTOMATED COUNT: 15.7 % (ref 12.3–15.4)
ERYTHROCYTE [DISTWIDTH] IN BLOOD BY AUTOMATED COUNT: 15.8 % (ref 12.3–15.4)
ERYTHROCYTE [DISTWIDTH] IN BLOOD BY AUTOMATED COUNT: 15.9 % (ref 12.3–15.4)
EXPIRATION DATE: NORMAL
FECAL OCCULT BLOOD SCREEN, POC: NEGATIVE
GFR SERPL CREATININE-BSD FRML MDRD: 37 ML/MIN/1.73
GFR SERPL CREATININE-BSD FRML MDRD: 41 ML/MIN/1.73
GFR SERPL CREATININE-BSD FRML MDRD: 47 ML/MIN/1.73
GFR SERPL CREATININE-BSD FRML MDRD: 54 ML/MIN/1.73
GFR SERPL CREATININE-BSD FRML MDRD: 56 ML/MIN/1.73
GLOBULIN UR ELPH-MCNC: 2.7 GM/DL
GLOBULIN UR ELPH-MCNC: 3.4 GM/DL
GLUCOSE SERPL-MCNC: 101 MG/DL (ref 65–99)
GLUCOSE SERPL-MCNC: 108 MG/DL (ref 65–99)
GLUCOSE SERPL-MCNC: 115 MG/DL (ref 65–99)
GLUCOSE SERPL-MCNC: 88 MG/DL (ref 65–99)
GLUCOSE SERPL-MCNC: 97 MG/DL (ref 65–99)
GLUCOSE UR STRIP-MCNC: NEGATIVE MG/DL
HCT VFR BLD AUTO: 16.5 % (ref 37.5–51)
HCT VFR BLD AUTO: 17.7 % (ref 37.5–51)
HCT VFR BLD AUTO: 19.5 % (ref 37.5–51)
HCT VFR BLD AUTO: 21 % (ref 37.5–51)
HCT VFR BLD AUTO: 21.1 % (ref 37.5–51)
HCT VFR BLD AUTO: 21.6 % (ref 37.5–51)
HGB BLD-MCNC: 5.5 G/DL (ref 13–17.7)
HGB BLD-MCNC: 6.2 G/DL (ref 13–17.7)
HGB BLD-MCNC: 6.4 G/DL (ref 13–17.7)
HGB BLD-MCNC: 7 G/DL (ref 13–17.7)
HGB BLD-MCNC: 7.1 G/DL (ref 13–17.7)
HGB BLD-MCNC: 7.2 G/DL (ref 13–17.7)
HGB UR QL STRIP.AUTO: ABNORMAL
HOLD SPECIMEN: NORMAL
HYALINE CASTS UR QL AUTO: ABNORMAL /LPF
IMM GRANULOCYTES # BLD AUTO: 0.06 10*3/MM3 (ref 0–0.05)
IMM GRANULOCYTES # BLD AUTO: 0.08 10*3/MM3 (ref 0–0.05)
IMM GRANULOCYTES # BLD AUTO: 0.08 10*3/MM3 (ref 0–0.05)
IMM GRANULOCYTES # BLD AUTO: 0.2 10*3/MM3 (ref 0–0.05)
IMM GRANULOCYTES NFR BLD AUTO: 0.7 % (ref 0–0.5)
IMM GRANULOCYTES NFR BLD AUTO: 0.7 % (ref 0–0.5)
IMM GRANULOCYTES NFR BLD AUTO: 0.9 % (ref 0–0.5)
IMM GRANULOCYTES NFR BLD AUTO: 1.9 % (ref 0–0.5)
KETONES UR QL STRIP: ABNORMAL
LEUKOCYTE ESTERASE UR QL STRIP.AUTO: ABNORMAL
LISS SCREEN: POSITIVE
LYMPHOCYTES # BLD AUTO: 1.59 10*3/MM3 (ref 0.7–3.1)
LYMPHOCYTES # BLD AUTO: 1.84 10*3/MM3 (ref 0.7–3.1)
LYMPHOCYTES # BLD AUTO: 1.91 10*3/MM3 (ref 0.7–3.1)
LYMPHOCYTES # BLD AUTO: 2.36 10*3/MM3 (ref 0.7–3.1)
LYMPHOCYTES NFR BLD AUTO: 16.4 % (ref 19.6–45.3)
LYMPHOCYTES NFR BLD AUTO: 18.7 % (ref 19.6–45.3)
LYMPHOCYTES NFR BLD AUTO: 20.1 % (ref 19.6–45.3)
LYMPHOCYTES NFR BLD AUTO: 22 % (ref 19.6–45.3)
Lab: NORMAL
MAGNESIUM SERPL-MCNC: 1.7 MG/DL (ref 1.6–2.4)
MAGNESIUM SERPL-MCNC: 2.1 MG/DL (ref 1.6–2.4)
MCH RBC QN AUTO: 33.9 PG (ref 26.6–33)
MCH RBC QN AUTO: 35 PG (ref 26.6–33)
MCH RBC QN AUTO: 35 PG (ref 26.6–33)
MCH RBC QN AUTO: 35.1 PG (ref 26.6–33)
MCH RBC QN AUTO: 38 PG (ref 26.6–33)
MCHC RBC AUTO-ENTMCNC: 32.8 G/DL (ref 31.5–35.7)
MCHC RBC AUTO-ENTMCNC: 33.2 G/DL (ref 31.5–35.7)
MCHC RBC AUTO-ENTMCNC: 33.3 G/DL (ref 31.5–35.7)
MCHC RBC AUTO-ENTMCNC: 33.8 G/DL (ref 31.5–35.7)
MCHC RBC AUTO-ENTMCNC: 35 G/DL (ref 31.5–35.7)
MCV RBC AUTO: 103.2 FL (ref 79–97)
MCV RBC AUTO: 103.4 FL (ref 79–97)
MCV RBC AUTO: 105.4 FL (ref 79–97)
MCV RBC AUTO: 105.5 FL (ref 79–97)
MCV RBC AUTO: 108.6 FL (ref 79–97)
MONOCYTES # BLD AUTO: 0.51 10*3/MM3 (ref 0.1–0.9)
MONOCYTES # BLD AUTO: 0.54 10*3/MM3 (ref 0.1–0.9)
MONOCYTES # BLD AUTO: 0.65 10*3/MM3 (ref 0.1–0.9)
MONOCYTES # BLD AUTO: 0.96 10*3/MM3 (ref 0.1–0.9)
MONOCYTES NFR BLD AUTO: 5.9 % (ref 5–12)
MONOCYTES NFR BLD AUTO: 6 % (ref 5–12)
MONOCYTES NFR BLD AUTO: 6 % (ref 5–12)
MONOCYTES NFR BLD AUTO: 8.3 % (ref 5–12)
NEGATIVE CONTROL: NEGATIVE
NEUTROPHILS NFR BLD AUTO: 5.87 10*3/MM3 (ref 1.7–7)
NEUTROPHILS NFR BLD AUTO: 6.59 10*3/MM3 (ref 1.7–7)
NEUTROPHILS NFR BLD AUTO: 66.6 % (ref 42.7–76)
NEUTROPHILS NFR BLD AUTO: 69 % (ref 42.7–76)
NEUTROPHILS NFR BLD AUTO: 7.17 10*3/MM3 (ref 1.7–7)
NEUTROPHILS NFR BLD AUTO: 71.8 % (ref 42.7–76)
NEUTROPHILS NFR BLD AUTO: 73.7 % (ref 42.7–76)
NEUTROPHILS NFR BLD AUTO: 8.57 10*3/MM3 (ref 1.7–7)
NITRITE UR QL STRIP: NEGATIVE
NONSPECIFIC COLD ANTIBODY: NORMAL
NRBC BLD AUTO-RTO: 0 /100 WBC (ref 0–0.2)
NT-PROBNP SERPL-MCNC: 702.5 PG/ML (ref 0–1800)
NT-PROBNP SERPL-MCNC: 706.6 PG/ML (ref 0–1800)
PH UR STRIP.AUTO: <=5 [PH] (ref 5–8)
PHOSPHATE SERPL-MCNC: 2.6 MG/DL (ref 2.5–4.5)
PLATELET # BLD AUTO: 387 10*3/MM3 (ref 140–450)
PLATELET # BLD AUTO: 413 10*3/MM3 (ref 140–450)
PLATELET # BLD AUTO: 447 10*3/MM3 (ref 140–450)
PLATELET # BLD AUTO: 487 10*3/MM3 (ref 140–450)
PLATELET # BLD AUTO: 501 10*3/MM3 (ref 140–450)
PMV BLD AUTO: 8.7 FL (ref 6–12)
PMV BLD AUTO: 9 FL (ref 6–12)
PMV BLD AUTO: 9 FL (ref 6–12)
PMV BLD AUTO: 9.5 FL (ref 6–12)
PMV BLD AUTO: 9.7 FL (ref 6–12)
POSITIVE CONTROL: POSITIVE
POTASSIUM SERPL-SCNC: 3.5 MMOL/L (ref 3.5–5.2)
POTASSIUM SERPL-SCNC: 4.3 MMOL/L (ref 3.5–5.2)
POTASSIUM SERPL-SCNC: 4.5 MMOL/L (ref 3.5–5.2)
POTASSIUM SERPL-SCNC: 4.6 MMOL/L (ref 3.5–5.2)
POTASSIUM SERPL-SCNC: 5.3 MMOL/L (ref 3.5–5.2)
PROCALCITONIN SERPL-MCNC: 0.15 NG/ML (ref 0–0.25)
PROT SERPL-MCNC: 5.6 G/DL (ref 6–8.5)
PROT SERPL-MCNC: 6.8 G/DL (ref 6–8.5)
PROT UR QL STRIP: ABNORMAL
QT INTERVAL: 356 MS
QTC INTERVAL: 415 MS
RBC # BLD AUTO: 1.63 10*6/MM3 (ref 4.14–5.8)
RBC # BLD AUTO: 1.89 10*6/MM3 (ref 4.14–5.8)
RBC # BLD AUTO: 2 10*6/MM3 (ref 4.14–5.8)
RBC # BLD AUTO: 2.03 10*6/MM3 (ref 4.14–5.8)
RBC # BLD AUTO: 2.05 10*6/MM3 (ref 4.14–5.8)
RBC # UR: ABNORMAL /HPF
REF LAB TEST METHOD: ABNORMAL
RH BLD: POSITIVE
SARS-COV-2 RDRP RESP QL NAA+PROBE: NORMAL
SODIUM SERPL-SCNC: 135 MMOL/L (ref 136–145)
SODIUM SERPL-SCNC: 137 MMOL/L (ref 136–145)
SODIUM SERPL-SCNC: 139 MMOL/L (ref 136–145)
SODIUM SERPL-SCNC: 140 MMOL/L (ref 136–145)
SODIUM SERPL-SCNC: 143 MMOL/L (ref 136–145)
SP GR UR STRIP: 1.02 (ref 1–1.03)
SQUAMOUS #/AREA URNS HPF: ABNORMAL /HPF
T&S EXPIRATION DATE: NORMAL
TROPONIN T SERPL-MCNC: <0.01 NG/ML (ref 0–0.03)
TSH SERPL DL<=0.05 MIU/L-ACNC: 1.91 UIU/ML (ref 0.27–4.2)
UNIT  ABO: NORMAL
UNIT  ABO: NORMAL
UNIT  RH: NORMAL
UNIT  RH: NORMAL
UROBILINOGEN UR QL STRIP: ABNORMAL
VIT B12 BLD-MCNC: 701 PG/ML (ref 211–946)
WBC # BLD AUTO: 10.75 10*3/MM3 (ref 3.4–10.8)
WBC # BLD AUTO: 11.63 10*3/MM3 (ref 3.4–10.8)
WBC # BLD AUTO: 8.51 10*3/MM3 (ref 3.4–10.8)
WBC # BLD AUTO: 9.16 10*3/MM3 (ref 3.4–10.8)
WBC # BLD AUTO: 9.75 10*3/MM3 (ref 3.4–10.8)
WBC UR QL AUTO: ABNORMAL /HPF
WHOLE BLOOD HOLD SPECIMEN: NORMAL
WHOLE BLOOD HOLD SPECIMEN: NORMAL

## 2021-01-01 PROCEDURE — 86870 RBC ANTIBODY IDENTIFICATION: CPT | Performed by: EMERGENCY MEDICINE

## 2021-01-01 PROCEDURE — 86901 BLOOD TYPING SEROLOGIC RH(D): CPT | Performed by: EMERGENCY MEDICINE

## 2021-01-01 PROCEDURE — 82607 VITAMIN B-12: CPT | Performed by: INTERNAL MEDICINE

## 2021-01-01 PROCEDURE — 25010000002 LORAZEPAM PER 2 MG: Performed by: NURSE PRACTITIONER

## 2021-01-01 PROCEDURE — 25010000002 HALOPERIDOL LACTATE PER 5 MG: Performed by: NURSE PRACTITIONER

## 2021-01-01 PROCEDURE — 94799 UNLISTED PULMONARY SVC/PX: CPT

## 2021-01-01 PROCEDURE — 87086 URINE CULTURE/COLONY COUNT: CPT | Performed by: EMERGENCY MEDICINE

## 2021-01-01 PROCEDURE — 83735 ASSAY OF MAGNESIUM: CPT | Performed by: EMERGENCY MEDICINE

## 2021-01-01 PROCEDURE — 97535 SELF CARE MNGMENT TRAINING: CPT

## 2021-01-01 PROCEDURE — 97161 PT EVAL LOW COMPLEX 20 MIN: CPT

## 2021-01-01 PROCEDURE — 80053 COMPREHEN METABOLIC PANEL: CPT | Performed by: NURSE PRACTITIONER

## 2021-01-01 PROCEDURE — 87635 SARS-COV-2 COVID-19 AMP PRB: CPT | Performed by: EMERGENCY MEDICINE

## 2021-01-01 PROCEDURE — 84443 ASSAY THYROID STIM HORMONE: CPT | Performed by: NURSE PRACTITIONER

## 2021-01-01 PROCEDURE — P9016 RBC LEUKOCYTES REDUCED: HCPCS

## 2021-01-01 PROCEDURE — 86900 BLOOD TYPING SEROLOGIC ABO: CPT | Performed by: EMERGENCY MEDICINE

## 2021-01-01 PROCEDURE — 85025 COMPLETE CBC W/AUTO DIFF WBC: CPT | Performed by: EMERGENCY MEDICINE

## 2021-01-01 PROCEDURE — 25010000002 AZITHROMYCIN PER 500 MG: Performed by: NURSE PRACTITIONER

## 2021-01-01 PROCEDURE — 25010000002 KETOROLAC TROMETHAMINE PER 15 MG: Performed by: NURSE PRACTITIONER

## 2021-01-01 PROCEDURE — 85027 COMPLETE CBC AUTOMATED: CPT | Performed by: INTERNAL MEDICINE

## 2021-01-01 PROCEDURE — 83880 ASSAY OF NATRIURETIC PEPTIDE: CPT | Performed by: NURSE PRACTITIONER

## 2021-01-01 PROCEDURE — 70551 MRI BRAIN STEM W/O DYE: CPT

## 2021-01-01 PROCEDURE — 25010000002 HYDROMORPHONE PER 4 MG: Performed by: NURSE PRACTITIONER

## 2021-01-01 PROCEDURE — 86922 COMPATIBILITY TEST ANTIGLOB: CPT

## 2021-01-01 PROCEDURE — 99232 SBSQ HOSP IP/OBS MODERATE 35: CPT | Performed by: INTERNAL MEDICINE

## 2021-01-01 PROCEDURE — 81001 URINALYSIS AUTO W/SCOPE: CPT | Performed by: EMERGENCY MEDICINE

## 2021-01-01 PROCEDURE — 25010000002 CEFTRIAXONE PER 250 MG: Performed by: EMERGENCY MEDICINE

## 2021-01-01 PROCEDURE — 84484 ASSAY OF TROPONIN QUANT: CPT | Performed by: EMERGENCY MEDICINE

## 2021-01-01 PROCEDURE — 99284 EMERGENCY DEPT VISIT MOD MDM: CPT

## 2021-01-01 PROCEDURE — 80048 BASIC METABOLIC PNL TOTAL CA: CPT | Performed by: INTERNAL MEDICINE

## 2021-01-01 PROCEDURE — 25010000002 LORAZEPAM PER 2 MG: Performed by: INTERNAL MEDICINE

## 2021-01-01 PROCEDURE — 85014 HEMATOCRIT: CPT | Performed by: NURSE PRACTITIONER

## 2021-01-01 PROCEDURE — 36430 TRANSFUSION BLD/BLD COMPNT: CPT

## 2021-01-01 PROCEDURE — 86920 COMPATIBILITY TEST SPIN: CPT

## 2021-01-01 PROCEDURE — 86900 BLOOD TYPING SEROLOGIC ABO: CPT

## 2021-01-01 PROCEDURE — 25010000002 CEFTRIAXONE PER 250 MG: Performed by: NURSE PRACTITIONER

## 2021-01-01 PROCEDURE — 85018 HEMOGLOBIN: CPT | Performed by: NURSE PRACTITIONER

## 2021-01-01 PROCEDURE — 25010000002 HALOPERIDOL LACTATE PER 5 MG: Performed by: FAMILY MEDICINE

## 2021-01-01 PROCEDURE — 85025 COMPLETE CBC W/AUTO DIFF WBC: CPT | Performed by: NURSE PRACTITIONER

## 2021-01-01 PROCEDURE — 25010000002 AZITHROMYCIN PER 500 MG: Performed by: EMERGENCY MEDICINE

## 2021-01-01 PROCEDURE — 25010000002 AZITHROMYCIN 500 MG/250 ML: Performed by: NURSE PRACTITIONER

## 2021-01-01 PROCEDURE — 87040 BLOOD CULTURE FOR BACTERIA: CPT | Performed by: EMERGENCY MEDICINE

## 2021-01-01 PROCEDURE — 25010000002 LORAZEPAM PER 2 MG: Performed by: FAMILY MEDICINE

## 2021-01-01 PROCEDURE — 94640 AIRWAY INHALATION TREATMENT: CPT

## 2021-01-01 PROCEDURE — 99222 1ST HOSP IP/OBS MODERATE 55: CPT | Performed by: NURSE PRACTITIONER

## 2021-01-01 PROCEDURE — 82270 OCCULT BLOOD FECES: CPT | Performed by: EMERGENCY MEDICINE

## 2021-01-01 PROCEDURE — 83605 ASSAY OF LACTIC ACID: CPT | Performed by: EMERGENCY MEDICINE

## 2021-01-01 PROCEDURE — 99233 SBSQ HOSP IP/OBS HIGH 50: CPT | Performed by: HOSPITALIST

## 2021-01-01 PROCEDURE — 85025 COMPLETE CBC W/AUTO DIFF WBC: CPT | Performed by: INTERNAL MEDICINE

## 2021-01-01 PROCEDURE — 83880 ASSAY OF NATRIURETIC PEPTIDE: CPT | Performed by: EMERGENCY MEDICINE

## 2021-01-01 PROCEDURE — 80053 COMPREHEN METABOLIC PANEL: CPT | Performed by: EMERGENCY MEDICINE

## 2021-01-01 PROCEDURE — 82330 ASSAY OF CALCIUM: CPT | Performed by: INTERNAL MEDICINE

## 2021-01-01 PROCEDURE — 84100 ASSAY OF PHOSPHORUS: CPT | Performed by: INTERNAL MEDICINE

## 2021-01-01 PROCEDURE — 71045 X-RAY EXAM CHEST 1 VIEW: CPT

## 2021-01-01 PROCEDURE — 99233 SBSQ HOSP IP/OBS HIGH 50: CPT | Performed by: INTERNAL MEDICINE

## 2021-01-01 PROCEDURE — 25010000002 CEFTRIAXONE: Performed by: NURSE PRACTITIONER

## 2021-01-01 PROCEDURE — 97165 OT EVAL LOW COMPLEX 30 MIN: CPT

## 2021-01-01 PROCEDURE — 25010000002 HYDROMORPHONE 1 MG/ML SOLUTION: Performed by: NURSE PRACTITIONER

## 2021-01-01 PROCEDURE — 84145 PROCALCITONIN (PCT): CPT | Performed by: EMERGENCY MEDICINE

## 2021-01-01 PROCEDURE — 83735 ASSAY OF MAGNESIUM: CPT | Performed by: INTERNAL MEDICINE

## 2021-01-01 PROCEDURE — 93005 ELECTROCARDIOGRAM TRACING: CPT | Performed by: EMERGENCY MEDICINE

## 2021-01-01 PROCEDURE — 86850 RBC ANTIBODY SCREEN: CPT | Performed by: EMERGENCY MEDICINE

## 2021-01-01 PROCEDURE — 99223 1ST HOSP IP/OBS HIGH 75: CPT | Performed by: FAMILY MEDICINE

## 2021-01-01 PROCEDURE — 70450 CT HEAD/BRAIN W/O DYE: CPT

## 2021-01-01 RX ORDER — POLYVINYL ALCOHOL 14 MG/ML
2 SOLUTION/ DROPS OPHTHALMIC
Status: DISCONTINUED | OUTPATIENT
Start: 2021-01-01 | End: 2021-01-01 | Stop reason: HOSPADM

## 2021-01-01 RX ORDER — HALOPERIDOL 5 MG/ML
0.5 INJECTION INTRAMUSCULAR EVERY 6 HOURS PRN
Status: DISCONTINUED | OUTPATIENT
Start: 2021-01-01 | End: 2021-01-01 | Stop reason: HOSPADM

## 2021-01-01 RX ORDER — SCOLOPAMINE TRANSDERMAL SYSTEM 1 MG/1
1 PATCH, EXTENDED RELEASE TRANSDERMAL
Status: DISCONTINUED | OUTPATIENT
Start: 2021-01-01 | End: 2021-01-01 | Stop reason: HOSPADM

## 2021-01-01 RX ORDER — LORAZEPAM 2 MG/ML
0.5 INJECTION INTRAMUSCULAR EVERY 6 HOURS
Status: DISCONTINUED | OUTPATIENT
Start: 2021-01-01 | End: 2021-01-01

## 2021-01-01 RX ORDER — SODIUM CHLORIDE 9 MG/ML
75 INJECTION, SOLUTION INTRAVENOUS CONTINUOUS
Status: DISCONTINUED | OUTPATIENT
Start: 2021-01-01 | End: 2021-01-01 | Stop reason: HOSPADM

## 2021-01-01 RX ORDER — ACETAMINOPHEN 650 MG/1
650 SUPPOSITORY RECTAL EVERY 4 HOURS PRN
Status: DISCONTINUED | OUTPATIENT
Start: 2021-01-01 | End: 2021-01-01 | Stop reason: HOSPADM

## 2021-01-01 RX ORDER — ACETAMINOPHEN 160 MG/5ML
650 SOLUTION ORAL EVERY 4 HOURS PRN
Status: DISCONTINUED | OUTPATIENT
Start: 2021-01-01 | End: 2021-01-01 | Stop reason: HOSPADM

## 2021-01-01 RX ORDER — LORAZEPAM 2 MG/ML
0.5 INJECTION INTRAMUSCULAR EVERY 4 HOURS
Status: DISCONTINUED | OUTPATIENT
Start: 2021-01-01 | End: 2021-01-01 | Stop reason: HOSPADM

## 2021-01-01 RX ORDER — QUETIAPINE FUMARATE 25 MG/1
25 TABLET, FILM COATED ORAL ONCE
Status: COMPLETED | OUTPATIENT
Start: 2021-01-01 | End: 2021-01-01

## 2021-01-01 RX ORDER — ACETAMINOPHEN 325 MG/1
650 TABLET ORAL EVERY 4 HOURS PRN
Status: DISCONTINUED | OUTPATIENT
Start: 2021-01-01 | End: 2021-01-01 | Stop reason: HOSPADM

## 2021-01-01 RX ORDER — ACETAMINOPHEN 650 MG/1
650 SUPPOSITORY RECTAL EVERY 4 HOURS PRN
Status: CANCELLED | OUTPATIENT
Start: 2021-01-01

## 2021-01-01 RX ORDER — LORAZEPAM 2 MG/ML
0.5 INJECTION INTRAMUSCULAR EVERY 4 HOURS PRN
Status: CANCELLED | OUTPATIENT
Start: 2021-01-01 | End: 2021-02-13

## 2021-01-01 RX ORDER — BUDESONIDE AND FORMOTEROL FUMARATE DIHYDRATE 80; 4.5 UG/1; UG/1
2 AEROSOL RESPIRATORY (INHALATION)
Status: CANCELLED | OUTPATIENT
Start: 2021-01-01

## 2021-01-01 RX ORDER — LORAZEPAM 2 MG/ML
0.25 INJECTION INTRAMUSCULAR ONCE
Status: COMPLETED | OUTPATIENT
Start: 2021-01-01 | End: 2021-01-01

## 2021-01-01 RX ORDER — HYDROMORPHONE HYDROCHLORIDE 1 MG/ML
0.5 INJECTION, SOLUTION INTRAMUSCULAR; INTRAVENOUS; SUBCUTANEOUS EVERY 6 HOURS
Status: DISCONTINUED | OUTPATIENT
Start: 2021-01-01 | End: 2021-01-01

## 2021-01-01 RX ORDER — BUDESONIDE AND FORMOTEROL FUMARATE DIHYDRATE 80; 4.5 UG/1; UG/1
2 AEROSOL RESPIRATORY (INHALATION)
Status: DISCONTINUED | OUTPATIENT
Start: 2021-01-01 | End: 2021-01-01 | Stop reason: HOSPADM

## 2021-01-01 RX ORDER — PANTOPRAZOLE SODIUM 40 MG/10ML
40 INJECTION, POWDER, LYOPHILIZED, FOR SOLUTION INTRAVENOUS ONCE
Status: COMPLETED | OUTPATIENT
Start: 2021-01-01 | End: 2021-01-01

## 2021-01-01 RX ORDER — SODIUM CHLORIDE 0.9 % (FLUSH) 0.9 %
10 SYRINGE (ML) INJECTION AS NEEDED
Status: CANCELLED | OUTPATIENT
Start: 2021-01-01

## 2021-01-01 RX ORDER — ACETAMINOPHEN 160 MG/5ML
650 SOLUTION ORAL EVERY 4 HOURS PRN
Status: CANCELLED | OUTPATIENT
Start: 2021-01-01

## 2021-01-01 RX ORDER — PRAVASTATIN SODIUM 40 MG
40 TABLET ORAL NIGHTLY
Status: DISCONTINUED | OUTPATIENT
Start: 2021-01-01 | End: 2021-01-01 | Stop reason: HOSPADM

## 2021-01-01 RX ORDER — QUETIAPINE FUMARATE 25 MG/1
25 TABLET, FILM COATED ORAL NIGHTLY
Status: DISCONTINUED | OUTPATIENT
Start: 2021-01-01 | End: 2021-01-01

## 2021-01-01 RX ORDER — HYDROMORPHONE HYDROCHLORIDE 1 MG/ML
0.5 INJECTION, SOLUTION INTRAMUSCULAR; INTRAVENOUS; SUBCUTANEOUS
Status: DISCONTINUED | OUTPATIENT
Start: 2021-01-01 | End: 2021-01-01 | Stop reason: HOSPADM

## 2021-01-01 RX ORDER — LORAZEPAM 2 MG/ML
0.5 INJECTION INTRAMUSCULAR EVERY 6 HOURS
Status: CANCELLED | OUTPATIENT
Start: 2021-01-01 | End: 2021-02-13

## 2021-01-01 RX ORDER — BISACODYL 10 MG
10 SUPPOSITORY, RECTAL RECTAL DAILY PRN
Status: DISCONTINUED | OUTPATIENT
Start: 2021-01-01 | End: 2021-01-01 | Stop reason: HOSPADM

## 2021-01-01 RX ORDER — LORAZEPAM 2 MG/ML
0.5 INJECTION INTRAMUSCULAR EVERY 4 HOURS PRN
Status: DISCONTINUED | OUTPATIENT
Start: 2021-01-01 | End: 2021-01-01 | Stop reason: HOSPADM

## 2021-01-01 RX ORDER — QUETIAPINE FUMARATE 25 MG/1
50 TABLET, FILM COATED ORAL EVERY 12 HOURS SCHEDULED
Status: DISCONTINUED | OUTPATIENT
Start: 2021-01-01 | End: 2021-01-01 | Stop reason: HOSPADM

## 2021-01-01 RX ORDER — DONEPEZIL HYDROCHLORIDE 10 MG/1
10 TABLET, FILM COATED ORAL NIGHTLY
Status: DISCONTINUED | OUTPATIENT
Start: 2021-01-01 | End: 2021-01-01 | Stop reason: HOSPADM

## 2021-01-01 RX ORDER — DONEPEZIL HYDROCHLORIDE 10 MG/1
10 TABLET, FILM COATED ORAL NIGHTLY
Status: CANCELLED | OUTPATIENT
Start: 2021-01-01

## 2021-01-01 RX ORDER — FUROSEMIDE 10 MG/ML
20 INJECTION INTRAMUSCULAR; INTRAVENOUS EVERY 6 HOURS PRN
Status: DISCONTINUED | OUTPATIENT
Start: 2021-01-01 | End: 2021-01-01 | Stop reason: HOSPADM

## 2021-01-01 RX ORDER — SODIUM CHLORIDE 0.9 % (FLUSH) 0.9 %
10 SYRINGE (ML) INJECTION AS NEEDED
Status: DISCONTINUED | OUTPATIENT
Start: 2021-01-01 | End: 2021-01-01

## 2021-01-01 RX ORDER — SODIUM CHLORIDE 0.9 % (FLUSH) 0.9 %
10 SYRINGE (ML) INJECTION EVERY 12 HOURS SCHEDULED
Status: CANCELLED | OUTPATIENT
Start: 2021-01-01

## 2021-01-01 RX ORDER — BISACODYL 10 MG
10 SUPPOSITORY, RECTAL RECTAL ONCE
Status: COMPLETED | OUTPATIENT
Start: 2021-01-01 | End: 2021-01-01

## 2021-01-01 RX ORDER — ACETAMINOPHEN 325 MG/1
650 TABLET ORAL EVERY 4 HOURS PRN
Status: CANCELLED | OUTPATIENT
Start: 2021-01-01

## 2021-01-01 RX ORDER — GLYCOPYRROLATE 0.2 MG/ML
0.2 INJECTION INTRAMUSCULAR; INTRAVENOUS EVERY 6 HOURS PRN
Status: DISCONTINUED | OUTPATIENT
Start: 2021-01-01 | End: 2021-01-01 | Stop reason: HOSPADM

## 2021-01-01 RX ORDER — HYDROMORPHONE HYDROCHLORIDE 1 MG/ML
0.5 INJECTION, SOLUTION INTRAMUSCULAR; INTRAVENOUS; SUBCUTANEOUS EVERY 4 HOURS
Status: DISCONTINUED | OUTPATIENT
Start: 2021-01-01 | End: 2021-01-01 | Stop reason: HOSPADM

## 2021-01-01 RX ORDER — ZIPRASIDONE MESYLATE 20 MG/ML
20 INJECTION, POWDER, LYOPHILIZED, FOR SOLUTION INTRAMUSCULAR ONCE
Status: DISCONTINUED | OUTPATIENT
Start: 2021-01-01 | End: 2021-01-01 | Stop reason: HOSPADM

## 2021-01-01 RX ORDER — HALOPERIDOL 5 MG/ML
1 INJECTION INTRAMUSCULAR EVERY 4 HOURS PRN
Status: DISCONTINUED | OUTPATIENT
Start: 2021-01-01 | End: 2021-01-01 | Stop reason: HOSPADM

## 2021-01-01 RX ORDER — LORAZEPAM 2 MG/ML
1 INJECTION INTRAMUSCULAR EVERY 4 HOURS PRN
Status: DISCONTINUED | OUTPATIENT
Start: 2021-01-01 | End: 2021-01-01 | Stop reason: HOSPADM

## 2021-01-01 RX ORDER — POLYVINYL ALCOHOL 14 MG/ML
2 SOLUTION/ DROPS OPHTHALMIC 2 TIMES DAILY
Status: DISCONTINUED | OUTPATIENT
Start: 2021-01-01 | End: 2021-01-01 | Stop reason: HOSPADM

## 2021-01-01 RX ORDER — SODIUM CHLORIDE 0.9 % (FLUSH) 0.9 %
10 SYRINGE (ML) INJECTION AS NEEDED
Status: DISCONTINUED | OUTPATIENT
Start: 2021-01-01 | End: 2021-01-01 | Stop reason: HOSPADM

## 2021-01-01 RX ORDER — SODIUM CHLORIDE 0.9 % (FLUSH) 0.9 %
10 SYRINGE (ML) INJECTION EVERY 12 HOURS SCHEDULED
Status: DISCONTINUED | OUTPATIENT
Start: 2021-01-01 | End: 2021-01-01 | Stop reason: HOSPADM

## 2021-01-01 RX ORDER — LORAZEPAM 2 MG/ML
0.5 INJECTION INTRAMUSCULAR ONCE AS NEEDED
Status: COMPLETED | OUTPATIENT
Start: 2021-01-01 | End: 2021-01-01

## 2021-01-01 RX ORDER — KETOROLAC TROMETHAMINE 15 MG/ML
15 INJECTION, SOLUTION INTRAMUSCULAR; INTRAVENOUS EVERY 6 HOURS PRN
Status: DISCONTINUED | OUTPATIENT
Start: 2021-01-01 | End: 2021-01-01 | Stop reason: HOSPADM

## 2021-01-01 RX ORDER — SODIUM CHLORIDE 9 MG/ML
75 INJECTION, SOLUTION INTRAVENOUS CONTINUOUS
Status: CANCELLED | OUTPATIENT
Start: 2021-01-01 | End: 2021-02-14

## 2021-01-01 RX ORDER — QUETIAPINE FUMARATE 25 MG/1
50 TABLET, FILM COATED ORAL NIGHTLY
Status: DISCONTINUED | OUTPATIENT
Start: 2021-01-01 | End: 2021-01-01

## 2021-01-01 RX ORDER — QUETIAPINE FUMARATE 25 MG/1
12.5 TABLET, FILM COATED ORAL ONCE
Status: COMPLETED | OUTPATIENT
Start: 2021-01-01 | End: 2021-01-01

## 2021-01-01 RX ORDER — QUETIAPINE FUMARATE 25 MG/1
50 TABLET, FILM COATED ORAL EVERY 12 HOURS SCHEDULED
Status: CANCELLED | OUTPATIENT
Start: 2021-01-01

## 2021-01-01 RX ORDER — LORAZEPAM 2 MG/ML
0.5 INJECTION INTRAMUSCULAR EVERY 6 HOURS
Status: DISCONTINUED | OUTPATIENT
Start: 2021-01-01 | End: 2021-01-01 | Stop reason: HOSPADM

## 2021-01-01 RX ORDER — HALOPERIDOL 5 MG/ML
0.5 INJECTION INTRAMUSCULAR EVERY 6 HOURS PRN
Status: CANCELLED | OUTPATIENT
Start: 2021-01-01

## 2021-01-01 RX ORDER — PRAVASTATIN SODIUM 40 MG
40 TABLET ORAL NIGHTLY
Status: CANCELLED | OUTPATIENT
Start: 2021-01-01

## 2021-01-01 RX ADMIN — HYDROMORPHONE HYDROCHLORIDE 1 MG: 1 INJECTION, SOLUTION INTRAMUSCULAR; INTRAVENOUS; SUBCUTANEOUS at 08:31

## 2021-01-01 RX ADMIN — SODIUM CHLORIDE 1 G: 900 INJECTION INTRAVENOUS at 14:59

## 2021-01-01 RX ADMIN — LORAZEPAM 0.25 MG: 2 INJECTION INTRAMUSCULAR; INTRAVENOUS at 01:43

## 2021-01-01 RX ADMIN — CEFTRIAXONE 1 G: 1 INJECTION, POWDER, FOR SOLUTION INTRAMUSCULAR; INTRAVENOUS at 16:56

## 2021-01-01 RX ADMIN — Medication: at 13:17

## 2021-01-01 RX ADMIN — HALOPERIDOL LACTATE 1 MG: 5 INJECTION, SOLUTION INTRAMUSCULAR at 08:30

## 2021-01-01 RX ADMIN — LORAZEPAM 0.5 MG: 2 INJECTION INTRAMUSCULAR; INTRAVENOUS at 10:18

## 2021-01-01 RX ADMIN — HYDROMORPHONE HYDROCHLORIDE 0.5 MG: 1 INJECTION, SOLUTION INTRAMUSCULAR; INTRAVENOUS; SUBCUTANEOUS at 20:41

## 2021-01-01 RX ADMIN — DONEPEZIL HYDROCHLORIDE 10 MG: 10 TABLET, FILM COATED ORAL at 20:01

## 2021-01-01 RX ADMIN — LORAZEPAM 0.5 MG: 2 INJECTION INTRAMUSCULAR; INTRAVENOUS at 23:45

## 2021-01-01 RX ADMIN — BISACODYL 10 MG: 10 SUPPOSITORY RECTAL at 08:44

## 2021-01-01 RX ADMIN — HYDROMORPHONE HYDROCHLORIDE 0.5 MG: 1 INJECTION, SOLUTION INTRAMUSCULAR; INTRAVENOUS; SUBCUTANEOUS at 12:30

## 2021-01-01 RX ADMIN — IPRATROPIUM BROMIDE 0.5 MG: 0.5 SOLUTION RESPIRATORY (INHALATION) at 12:31

## 2021-01-01 RX ADMIN — HYDROMORPHONE HYDROCHLORIDE 0.5 MG: 1 INJECTION, SOLUTION INTRAMUSCULAR; INTRAVENOUS; SUBCUTANEOUS at 23:45

## 2021-01-01 RX ADMIN — LORAZEPAM 1 MG: 2 INJECTION INTRAMUSCULAR; INTRAVENOUS at 09:01

## 2021-01-01 RX ADMIN — PRAVASTATIN SODIUM 40 MG: 40 TABLET ORAL at 20:01

## 2021-01-01 RX ADMIN — LORAZEPAM 0.5 MG: 2 INJECTION INTRAMUSCULAR; INTRAVENOUS at 14:17

## 2021-01-01 RX ADMIN — HALOPERIDOL LACTATE 0.5 MG: 5 INJECTION, SOLUTION INTRAMUSCULAR at 05:42

## 2021-01-01 RX ADMIN — HALOPERIDOL LACTATE 0.5 MG: 5 INJECTION, SOLUTION INTRAMUSCULAR at 00:14

## 2021-01-01 RX ADMIN — POLYVINYL ALCOHOL 2 DROP: 14 SOLUTION/ DROPS OPHTHALMIC at 20:42

## 2021-01-01 RX ADMIN — LORAZEPAM 0.25 MG: 2 INJECTION INTRAMUSCULAR; INTRAVENOUS at 03:48

## 2021-01-01 RX ADMIN — LORAZEPAM 0.5 MG: 2 INJECTION INTRAMUSCULAR; INTRAVENOUS at 12:30

## 2021-01-01 RX ADMIN — AZITHROMYCIN 500 MG: 500 INJECTION, POWDER, LYOPHILIZED, FOR SOLUTION INTRAVENOUS at 15:10

## 2021-01-01 RX ADMIN — POLYVINYL ALCOHOL 2 DROP: 14 SOLUTION/ DROPS OPHTHALMIC at 12:31

## 2021-01-01 RX ADMIN — HYDROMORPHONE HYDROCHLORIDE 0.5 MG: 1 INJECTION, SOLUTION INTRAMUSCULAR; INTRAVENOUS; SUBCUTANEOUS at 14:17

## 2021-01-01 RX ADMIN — Medication: at 17:20

## 2021-01-01 RX ADMIN — GLYCOPYRROLATE 0.2 MG: 0.2 INJECTION INTRAMUSCULAR; INTRAVENOUS at 17:23

## 2021-01-01 RX ADMIN — PANTOPRAZOLE SODIUM 40 MG: 40 INJECTION, POWDER, FOR SOLUTION INTRAVENOUS at 15:26

## 2021-01-01 RX ADMIN — POLYVINYL ALCOHOL 2 DROP: 14 SOLUTION/ DROPS OPHTHALMIC at 20:38

## 2021-01-01 RX ADMIN — LORAZEPAM 0.5 MG: 2 INJECTION INTRAMUSCULAR; INTRAVENOUS at 07:51

## 2021-01-01 RX ADMIN — LORAZEPAM 0.5 MG: 2 INJECTION INTRAMUSCULAR; INTRAVENOUS at 01:35

## 2021-01-01 RX ADMIN — AZITHROMYCIN 500 MG: 500 INJECTION, POWDER, LYOPHILIZED, FOR SOLUTION INTRAVENOUS at 15:23

## 2021-01-01 RX ADMIN — SODIUM CHLORIDE 75 ML/HR: 9 INJECTION, SOLUTION INTRAVENOUS at 03:40

## 2021-01-01 RX ADMIN — HALOPERIDOL LACTATE 0.5 MG: 5 INJECTION, SOLUTION INTRAMUSCULAR at 12:12

## 2021-01-01 RX ADMIN — IPRATROPIUM BROMIDE 0.5 MG: 0.5 SOLUTION RESPIRATORY (INHALATION) at 15:21

## 2021-01-01 RX ADMIN — AZITHROMYCIN 500 MG: 500 INJECTION, POWDER, LYOPHILIZED, FOR SOLUTION INTRAVENOUS at 15:01

## 2021-01-01 RX ADMIN — SCOPALAMINE 1 PATCH: 1 PATCH, EXTENDED RELEASE TRANSDERMAL at 15:51

## 2021-01-01 RX ADMIN — CEFTRIAXONE 1 G: 1 INJECTION, POWDER, FOR SOLUTION INTRAMUSCULAR; INTRAVENOUS at 15:01

## 2021-01-01 RX ADMIN — QUETIAPINE FUMARATE 25 MG: 25 TABLET ORAL at 20:03

## 2021-01-01 RX ADMIN — SODIUM CHLORIDE, PRESERVATIVE FREE 10 ML: 5 INJECTION INTRAVENOUS at 23:07

## 2021-01-01 RX ADMIN — LORAZEPAM 1 MG: 2 INJECTION INTRAMUSCULAR; INTRAVENOUS at 08:15

## 2021-01-01 RX ADMIN — SODIUM CHLORIDE 500 ML: 9 INJECTION, SOLUTION INTRAVENOUS at 17:13

## 2021-01-01 RX ADMIN — QUETIAPINE FUMARATE 25 MG: 25 TABLET ORAL at 12:38

## 2021-01-01 RX ADMIN — KETOROLAC TROMETHAMINE 15 MG: 15 INJECTION, SOLUTION INTRAMUSCULAR; INTRAVENOUS at 08:08

## 2021-01-01 RX ADMIN — SODIUM CHLORIDE 50 ML/HR: 9 INJECTION, SOLUTION INTRAVENOUS at 18:19

## 2021-01-01 RX ADMIN — LORAZEPAM 0.5 MG: 2 INJECTION INTRAMUSCULAR; INTRAVENOUS at 20:42

## 2021-01-01 RX ADMIN — SODIUM CHLORIDE 1000 ML: 9 INJECTION, SOLUTION INTRAVENOUS at 14:02

## 2021-01-01 RX ADMIN — SODIUM CHLORIDE, PRESERVATIVE FREE 10 ML: 5 INJECTION INTRAVENOUS at 20:02

## 2021-01-01 RX ADMIN — SODIUM CHLORIDE 75 ML/HR: 9 INJECTION, SOLUTION INTRAVENOUS at 05:42

## 2021-01-01 RX ADMIN — AZITHROMYCIN 500 MG: 500 INJECTION, POWDER, LYOPHILIZED, FOR SOLUTION INTRAVENOUS at 15:28

## 2021-01-01 RX ADMIN — LORAZEPAM 0.5 MG: 2 INJECTION INTRAMUSCULAR; INTRAVENOUS at 20:37

## 2021-01-01 RX ADMIN — HYDROMORPHONE HYDROCHLORIDE 0.5 MG: 1 INJECTION, SOLUTION INTRAMUSCULAR; INTRAVENOUS; SUBCUTANEOUS at 20:37

## 2021-01-01 RX ADMIN — Medication: at 20:38

## 2021-01-01 RX ADMIN — HYDROMORPHONE HYDROCHLORIDE 0.5 MG: 1 INJECTION, SOLUTION INTRAMUSCULAR; INTRAVENOUS; SUBCUTANEOUS at 15:49

## 2021-01-01 RX ADMIN — HYDROMORPHONE HYDROCHLORIDE 1 MG: 1 INJECTION, SOLUTION INTRAMUSCULAR; INTRAVENOUS; SUBCUTANEOUS at 17:23

## 2021-01-01 RX ADMIN — QUETIAPINE FUMARATE 50 MG: 25 TABLET ORAL at 08:44

## 2021-01-01 RX ADMIN — QUETIAPINE FUMARATE 12.5 MG: 25 TABLET ORAL at 22:57

## 2021-01-01 RX ADMIN — HYDROMORPHONE HYDROCHLORIDE 0.5 MG: 1 INJECTION, SOLUTION INTRAMUSCULAR; INTRAVENOUS; SUBCUTANEOUS at 02:59

## 2021-01-01 RX ADMIN — LORAZEPAM 0.5 MG: 2 INJECTION INTRAMUSCULAR; INTRAVENOUS at 02:53

## 2021-01-01 RX ADMIN — Medication: at 23:45

## 2021-01-01 RX ADMIN — LORAZEPAM 0.5 MG: 2 INJECTION INTRAMUSCULAR; INTRAVENOUS at 15:50

## 2021-01-01 RX ADMIN — CEFTRIAXONE 1 G: 1 INJECTION, POWDER, FOR SOLUTION INTRAMUSCULAR; INTRAVENOUS at 15:09

## 2021-01-01 RX ADMIN — IPRATROPIUM BROMIDE 0.5 MG: 0.5 SOLUTION RESPIRATORY (INHALATION) at 21:16

## 2021-01-01 RX ADMIN — SODIUM CHLORIDE, PRESERVATIVE FREE 10 ML: 5 INJECTION INTRAVENOUS at 08:49

## 2021-01-01 RX ADMIN — IPRATROPIUM BROMIDE 0.5 MG: 0.5 SOLUTION RESPIRATORY (INHALATION) at 09:00

## 2021-01-01 RX ADMIN — SODIUM CHLORIDE, PRESERVATIVE FREE 10 ML: 5 INJECTION INTRAVENOUS at 23:16

## 2021-01-01 RX ADMIN — HYDROMORPHONE HYDROCHLORIDE 0.5 MG: 1 INJECTION, SOLUTION INTRAMUSCULAR; INTRAVENOUS; SUBCUTANEOUS at 07:58

## 2021-02-02 PROBLEM — N17.9 AKI (ACUTE KIDNEY INJURY) (HCC): Status: ACTIVE | Noted: 2021-01-01

## 2021-02-02 PROBLEM — I95.9 HYPOTENSION: Status: ACTIVE | Noted: 2021-01-01

## 2021-02-02 PROBLEM — R41.82 AMS (ALTERED MENTAL STATUS): Status: ACTIVE | Noted: 2021-01-01

## 2021-02-02 PROBLEM — D58.9 HEMOLYTIC ANEMIA (HCC): Status: ACTIVE | Noted: 2021-01-01

## 2021-02-02 NOTE — PROGRESS NOTES
Discharge Planning Assessment  [facility]     Patient Name: Negriot Joe  MRN: [id number]  Today's Date: 2/2/2021    Admit Date: 2/2/2021    Discharge Needs Assessment     Row Name 02/02/21 1520       Living Environment    Lives With  spouse    Current Living Arrangements  home/apartment/condo    Potentially Unsafe Housing Conditions  unable to assess    Primary Care Provided by  self    Provides Primary Care For  no one    Family Caregiver if Needed  child(edilia), adult    Family Caregiver Names  Leonila Garner    Quality of Family Relationships  helpful;involved;supportive    Able to Return to Prior Arrangements  yes       Resource/Environmental Concerns    Resource/Environmental Concerns  none    Transportation Concerns  car, none       Transition Planning    Patient/Family Anticipates Transition to  home with family    Patient/Family Anticipated Services at Transition  none    Transportation Anticipated  family or friend will provide       Discharge Needs Assessment    Readmission Within the Last 30 Days  no previous admission in last 30 days    Concerns to be Addressed  discharge planning    Anticipated Changes Related to Illness  none    Equipment Needed After Discharge  none        Discharge Plan     Row Name 02/02/21 1522       Plan    Plan  initial    Plan Comments  Pt lives in [facility] with his spouse. At baseline he is independent; he is confused here today in ER. Plan TBD when mental status returns to baseline PCP is Dany Astudillo    Final Discharge Disposition Code  30 - still a patient        Continued Care and Services - Admitted Since 2/2/2021    Coordination has not been started for this encounter.         Demographic Summary    No documentation.       Functional Status     Row Name 02/02/21 1519       Functional Status    Usual Activity Tolerance  moderate       Functional Status, IADL    Medications  assistive person    Meal Preparation  assistive person    Housekeeping  assistive person     Laundry  assistive person    Shopping  assistive person       Mental Status    General Appearance WDL  WDL       Mental Status Summary    Mental Status Comments  here for AMS       Employment/    Employment Status  retired        Psychosocial    No documentation.       Abuse/Neglect    No documentation.       Legal    No documentation.       Substance Abuse    No documentation.       Patient Forms    No documentation.           Isa Mcbride RN

## 2021-02-02 NOTE — ED PROVIDER NOTES
EMERGENCY DEPARTMENT ENCOUNTER    Room Number:  05/05  Date of encounter:  2/2/2021  PCP: Dany Astudillo MD  Historian: Son-in-law and patient      HPI:  Chief Complaint: Altered mental status        Context: Negrito Joe is a 86 y.o. male who presents to the ED c/o altered mental status noticed mostly by his son-in-law.  The son-in-law notes that over the last 2 days the patient has been gradually more confused.  He has been talking about flying planes again (patient was a  in [place]).  Patient denies any complaints right now.  He is uncertain where currently is but does not believe that he is confused.  He denies pain, cough, fever, chills.      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   • Asthma 04/26/2019   • Hyperlipidemia 04/26/2019   • Monoclonal gammopathy 10/22/2020     Resolved Ambulatory Problems     Diagnosis Date Noted   • No Resolved Ambulatory Problems     No Additional Past Medical History         PAST SURGICAL HISTORY  Past Surgical History:   Procedure Laterality Date   • COLONOSCOPY     • HERNIA REPAIR      1982 & 2014   • SINUS SURGERY      Multiple         FAMILY HISTORY  Family History   Problem Relation Age of Onset   • Aneurysm Father    • Heart attack Father    • Cancer Other          SOCIAL HISTORY  Social History     Socioeconomic History   • Marital status:      Spouse name: Not on file   • Number of children: Not on file   • Years of education: Not on file   • Highest education level: Not on file   Tobacco Use   • Smoking status: Never Smoker   • Smokeless tobacco: Never Used   Substance and Sexual Activity   • Alcohol use: Yes     Comment: occ- 4x/monthly   • Drug use: No         ALLERGIES  Bactrim [sulfamethoxazole-trimethoprim] and Tetracycline        REVIEW OF SYSTEMS  Review of Systems     All systems reviewed and negative except  for those discussed in HPI.       PHYSICAL EXAM    I have reviewed the triage vital signs and nursing notes.    ED Triage Vitals [02/02/21 1241]   Temp Heart Rate Resp BP SpO2   96.6 °F (35.9 °C) 92 17 106/41 99 %      Temp src Heart Rate Source Patient Position BP Location FiO2 (%)   -- -- -- -- --       Physical Exam  GENERAL:   Appears slow to answer questions but nontoxic.  HENT: Nares patent.  Dry mucous membranes no murmurs gallops rubs  EYES: No scleral icterus.  CV: Regular rhythm, regular rate.  RESPIRATORY: Normal effort.  No audible wheezes, rales or rhonchi.  Clear to auscultation  ABDOMEN: Soft, nontender.  No masses  MUSCULOSKELETAL: No deformities.   NEURO: Awake, moves all extremities equally, follows commands.  Oriented to person and exact date.  Confused about location and recent events.  SKIN: Warm, dry, no rash visualized.        LAB RESULTS  Recent Results (from the past 24 hour(s))   Comprehensive Metabolic Panel    Collection Time: 02/02/21 12:45 PM    Specimen: Blood   Result Value Ref Range    Glucose 115 (H) 65 - 99 mg/dL    BUN 34 (H) 8 - 23 mg/dL    Creatinine 1.74 (H) 0.76 - 1.27 mg/dL    Sodium 135 (L) 136 - 145 mmol/L    Potassium 5.3 (H) 3.5 - 5.2 mmol/L    Chloride 103 98 - 107 mmol/L    CO2 23.0 22.0 - 29.0 mmol/L    Calcium 8.8 8.6 - 10.5 mg/dL    Total Protein 6.8 6.0 - 8.5 g/dL    Albumin 3.40 (L) 3.50 - 5.20 g/dL    ALT (SGPT) 14 1 - 41 U/L    AST (SGOT) 23 1 - 40 U/L    Alkaline Phosphatase 84 39 - 117 U/L    Total Bilirubin 2.0 (H) 0.0 - 1.2 mg/dL    eGFR Non African Amer 37 (L) >60 mL/min/1.73    Globulin 3.4 gm/dL    A/G Ratio 1.0 g/dL    BUN/Creatinine Ratio 19.5 7.0 - 25.0    Anion Gap 9.0 5.0 - 15.0 mmol/L   Troponin    Collection Time: 02/02/21 12:45 PM    Specimen: Blood   Result Value Ref Range    Troponin T <0.010 0.000 - 0.030 ng/mL   Magnesium    Collection Time: 02/02/21 12:45 PM    Specimen: Blood   Result Value Ref Range    Magnesium 2.1 1.6 - 2.4 mg/dL   Light  Blue Top    Collection Time: 02/02/21 12:45 PM   Result Value Ref Range    Extra Tube hold for add-on    Green Top (Gel)    Collection Time: 02/02/21 12:45 PM   Result Value Ref Range    Extra Tube Hold for add-ons.    Lavender Top    Collection Time: 02/02/21 12:45 PM   Result Value Ref Range    Extra Tube hold for add-on    Gold Top - SST    Collection Time: 02/02/21 12:45 PM   Result Value Ref Range    Extra Tube Hold for add-ons.    Gray Top - Ice    Collection Time: 02/02/21 12:45 PM   Result Value Ref Range    Extra Tube Hold for add-ons.    CBC Auto Differential    Collection Time: 02/02/21 12:45 PM    Specimen: Blood   Result Value Ref Range    WBC 11.63 (H) 3.40 - 10.80 10*3/mm3    RBC 1.63 (L) 4.14 - 5.80 10*6/mm3    Hemoglobin 6.2 (C) 13.0 - 17.7 g/dL    Hematocrit 17.7 (C) 37.5 - 51.0 %    .6 (H) 79.0 - 97.0 fL    MCH 38.0 (H) 26.6 - 33.0 pg    MCHC 35.0 31.5 - 35.7 g/dL    RDW 15.3 12.3 - 15.4 %    RDW-SD 55.1 (H) 37.0 - 54.0 fl    MPV 9.7 6.0 - 12.0 fL    Platelets 413 140 - 450 10*3/mm3    Neutrophil % 73.7 42.7 - 76.0 %    Lymphocyte % 16.4 (L) 19.6 - 45.3 %    Monocyte % 8.3 5.0 - 12.0 %    Eosinophil % 0.6 0.3 - 6.2 %    Basophil % 0.3 0.0 - 1.5 %    Immature Grans % 0.7 (H) 0.0 - 0.5 %    Neutrophils, Absolute 8.57 (H) 1.70 - 7.00 10*3/mm3    Lymphocytes, Absolute 1.91 0.70 - 3.10 10*3/mm3    Monocytes, Absolute 0.96 (H) 0.10 - 0.90 10*3/mm3    Eosinophils, Absolute 0.07 0.00 - 0.40 10*3/mm3    Basophils, Absolute 0.04 0.00 - 0.20 10*3/mm3    Immature Grans, Absolute 0.08 (H) 0.00 - 0.05 10*3/mm3    nRBC 0.0 0.0 - 0.2 /100 WBC   BNP    Collection Time: 02/02/21 12:45 PM    Specimen: Blood   Result Value Ref Range    proBNP 706.6 0.0-1,800.0 pg/mL   Lactic Acid, Plasma    Collection Time: 02/02/21 12:45 PM    Specimen: Blood   Result Value Ref Range    Lactate 0.9 0.5 - 2.0 mmol/L   Procalcitonin    Collection Time: 02/02/21 12:45 PM    Specimen: Blood   Result Value Ref Range     Procalcitonin 0.15 0.00 - 0.25 ng/mL   ECG 12 Lead    Collection Time: 02/02/21 12:50 PM   Result Value Ref Range    QT Interval 356 ms    QTC Interval 415 ms   Urinalysis With Microscopic If Indicated (No Culture) - Urine, Clean Catch    Collection Time: 02/02/21  1:01 PM    Specimen: Urine, Clean Catch   Result Value Ref Range    Color, UA Dark Yellow (A) Yellow, Straw    Appearance, UA Cloudy (A) Clear    pH, UA <=5.0 5.0 - 8.0    Specific Gravity, UA 1.020 1.001 - 1.030    Glucose, UA Negative Negative    Ketones, UA Trace (A) Negative    Bilirubin, UA Negative Negative    Blood, UA Moderate (2+) (A) Negative    Protein, UA 30 mg/dL (1+) (A) Negative    Leuk Esterase, UA Small (1+) (A) Negative    Nitrite, UA Negative Negative    Urobilinogen, UA 1.0 E.U./dL 0.2 - 1.0 E.U./dL   Urinalysis, Microscopic Only - Urine, Clean Catch    Collection Time: 02/02/21  1:01 PM    Specimen: Urine, Clean Catch   Result Value Ref Range    RBC, UA 31-50 (A) None Seen, 0-2 /HPF    WBC, UA 6-12 (A) None Seen, 0-2 /HPF    Bacteria, UA None Seen None Seen, Trace /HPF    Squamous Epithelial Cells, UA 0-2 None Seen, 0-2 /HPF    Hyaline Casts, UA 0-6 0 - 6 /LPF    Methodology Automated Microscopy    COVID-19, [facility] IN-HOUSE,NASAL Swab (NO TRANSPORT MEDIA) 2 HR TAT - Swab, Nasopharynx    Collection Time: 02/02/21  2:13 PM    Specimen: Nasopharynx; Swab   Result Value Ref Range    COVID19 Presumptive Negative Presumptive Negative - Ref. Range   POCT Occult Blood, stool    Collection Time: 02/02/21  3:23 PM    Specimen: Per Rectum; Stool   Result Value Ref Range    Fecal Occult Blood Negative Negative    Lot Number [id number]     Expiration Date 9/2,023     DEVELOPER LOT NUMBER [id number]     DEVELOPER EXPIRATION DATE 10/2,022     Positive Control Positive Positive    Negative Control Negative Negative       If labs were ordered, I independently reviewed the results.        RADIOLOGY  Ct Head Without Contrast    Result Date:  2/2/2021  EXAMINATION: CT HEAD WO CONTRAST- 02/02/2021  INDICATION: AMS  TECHNIQUE: 5 mm unenhanced images through the brain  The radiation dose reduction device was turned on for each scan per the ALARA (As Low as Reasonably Achievable) protocol.  COMPARISON: NONE  FINDINGS: The calvarium appears intact. There is evidence of previous paranasal sinus surgery, and opacification of much of the remaining paranasal sinuses. There is also trace left mastoid disease. Soft tissue window images show expected degree of generalized cerebral atrophy for age. There is no evidence of mass or mass effect, edema, infarct, hemorrhage, hydrocephalus, or abnormal extra-axial collection. No gross abnormalities are seen in the orbits.      Age-appropriate generalized cerebral atrophy. No evidence of acute intracranial disease. Incidentally noted paranasal sinus disease.  D:  02/02/2021 E:  02/02/2021       Xr Chest 1 View    Result Date: 2/2/2021  EXAMINATION: XR CHEST 1 VW-02/02/2021:  INDICATION: Weak/Dizzy/AMS, triage protocol.  COMPARISON: NONE.  FINDINGS: The heart shadow is in the upper range of normal size. The vasculature is cephalized. There is a mild diffuse interstitial disease pattern, resembling early changes of interstitial edema. There is probably a component of chronic pulmonary change as well. In addition, there is focal, rather mild patchy changes of the left lung base that could represent atelectasis or perhaps mild changes of pneumonia. The right lung base appears clear allowing for the overlying shadows of the calcified costal cartilages. The bony structures appear to be intact.      1. Pulmonary venous hypertension, and possibly minimal/early interstitial edema. 2. Mild left basilar atelectasis versus early changes of pneumonia.   D:  02/02/2021 E:  02/02/2021         I ordered and reviewed the above noted radiographic studies.    I viewed images of head CT which showed no evidence of acute bleed or acute ischemia  per my independent interpretation.  See radiologist's dictation for official interpretation.        PROCEDURES    Critical Care  Performed by: Keyana Gonzalez MD  Authorized by: Keyana Gonzalez MD     Critical care provider statement:     Critical care time (minutes):  35    Critical care time was exclusive of:  Separately billable procedures and treating other patients    Critical care was necessary to treat or prevent imminent or life-threatening deterioration of the following conditions:  Circulatory failure    Critical care was time spent personally by me on the following activities:  Ordering and performing treatments and interventions, ordering and review of laboratory studies, ordering and review of radiographic studies, pulse oximetry, re-evaluation of patient's condition, review of old charts, obtaining history from patient or surrogate, examination of patient, evaluation of patient's response to treatment, discussions with consultants and development of treatment plan with patient or surrogate        ECG 12 Lead   Final Result   Test Reason : Weak/Dizzy/AMS protocol   Blood Pressure : **/** mmHG   Vent. Rate : 082 BPM     Atrial Rate : 082 BPM      P-R Int : 158 ms          QRS Dur : 110 ms       QT Int : 356 ms       P-R-T Axes : 066 -53 065 degrees      QTc Int : 415 ms      Normal sinus rhythm   Left anterior fascicular block   Abnormal ECG   No previous ECGs available   Confirmed by KEYANA GONZALEZ MD ([id number]) on 2/2/2021 2:38:26 PM      Referred By:  EDMD           Confirmed By:KEYANA GONZALEZ MD          MEDICATIONS GIVEN IN ER    Medications   sodium chloride 0.9 % flush 10 mL (has no administration in time range)   AZITHROMYCIN 500 MG/250 ML 0.9% NS IVPB (vial-mate) (500 mg Intravenous New Bag 2/2/21 1528)   sodium chloride 0.9 % bolus 1,000 mL (0 mL Intravenous Stopped 2/2/21 1505)   cefTRIAXone (ROCEPHIN) 1 g/100 mL 0.9% NS (MBP) (1 g Intravenous New Bag 2/2/21 2807)   pantoprazole (PROTONIX)  injection 40 mg (40 mg Intravenous Given 2/2/21 1526)         PROGRESS, DATA ANALYSIS, CONSULTS, AND MEDICAL DECISION MAKING    All labs have been independently reviewed by me.  All radiology studies have been reviewed by me and the radiologist dictating the report.   EKG's have been independently viewed and interpreted by me.            ED Course as of Feb 02 1530   Tue Feb 02, 2021   1425 Prevoid bladder scan showed 90 mL.  Patient able to urinate afterward.  Patient presents with acute kidney injury, appears prerenal.  Hypotensive as well.  Receiving normal saline 1 L bolus.  I have cultured his urine.    [MS]   1504 Patient is still hypotensive.  H&H have returned at 6.2 and 17.7.  I have ordered packed red blood cells, guaiac testing, Protonix in addition to antibiotics for presumed pneumonia.  I have updated his PCP.  We will admit.    [MS]   1511 I spoke with Dr. Astudillo, PCP.  Updated him on findings.  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    [MS]   1528 I paged the hospitalist for admission.    [MS]      ED Course User Index  [MS] Jacob Tellez MD             AS OF 15:30 EST VITALS:    BP - 103/52  HR - 90  TEMP - 96.6 °F (35.9 °C)  O2 SATS - 100%        DIAGNOSIS  Final diagnoses:   Altered mental status, unspecified altered mental status type   Symptomatic anemia   Dehydration         DISPOSITION  Admission           Jacob Tellez MD  02/04/21 [id number]

## 2021-02-02 NOTE — H&P
"    [facility]  HISTORY AND PHYSICAL    Patient Name: Negrito Joe  : 1934  MRN: [id number]  Primary Care Physician: Dany Astudillo MD  Date of admission: 2021      Subjective   Subjective     Chief Complaint:  AMS    HPI:  Negrito Joe is a 86 y.o. 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 Pt presents to the ED w son-in-law w cc of altered mental status. The son-in-law notes that over the last 2 days the patient has been gradually more confused.  He has been talking about flying planes again (patient was a  in [place]). His wife passed away in 2020. He lives alone and is normally oriented and cares for himself.  Has a caregiver several hrs a day for assist. Patient denies any complaints right now.  He is uncertain where currently is but does not believe that he is confused.  He denies pain, cough, fever, chills, n/v or soa.  States he feels \"okay\" now.  No issues per family except for confusion since yesterday.     On ER eval patient was noted to have CELY with a creatinine of 1.74.  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  Hemoglobin 6.2 and was recently 11.36 in October.  CT head was negative for acute finding.  Negative Covid.  UA with small leukocytes and moderate WBCs but no nitrites or bacteria.  Chest x-ray with mild left basilar atelectasis versus early changes of pneumonia.  Patient received azithromycin and Rocephin in ER.  IV PPI and IV fluid bolus.  Will admit to hospital medicine service for status, CELY, and anemia.  Possible early pneumonia versus UTI.    COVID Details: [x] No Symptoms (none reported by patient or his son-in-law).  Symptoms: [] Fever []  Cough [] Shortness of breath [] Change in taste or smell  Risks:  [] Direct Exposure [] High risk facility   Date of Onset:  ____  Date of first positive " COVID test:     Review of Systems   Constitutional: Positive for activity change and fatigue. Negative for fever.   HENT: Negative.    Respiratory: Negative for cough, shortness of breath and wheezing.    Cardiovascular: Positive for leg swelling.        Patient's son in law reports chronic lower extremity edema for about the last 10 years.  No known CHF.   Gastrointestinal: Negative.    Genitourinary: Negative.    Musculoskeletal: Negative.    Skin: Positive for pallor.   Neurological: Negative.    Hematological: Negative.    Psychiatric/Behavioral: Positive for confusion and decreased concentration.        All other systems reviewed and are negative.     Personal History     Past Medical History:   Diagnosis Date   • Asthma        Past Surgical History:   Procedure Laterality Date   • COLONOSCOPY     • HERNIA REPAIR      1982 & 2014   • SINUS SURGERY      Multiple       Family History: family history includes Aneurysm in his father; Cancer in an other family member; Heart attack in his father. Otherwise pertinent FHx was reviewed and unremarkable.     Social History:  reports that he has never smoked. He has never used smokeless tobacco. He reports current alcohol use. He reports that he does not use drugs.  Social History     Social History Narrative   • Not on file       Medications:  Available home medication information reviewed.  (Not in a hospital admission)      Allergies   Allergen Reactions   • Bactrim [Sulfamethoxazole-Trimethoprim]    • Tetracycline Rash       Objective   Objective     Vital Signs:   Temp:  [96.6 °F (35.9 °C)] 96.6 °F (35.9 °C)  Heart Rate:  [75-96] 92  Resp:  [17] 17  BP: ()/(41-55) 98/54       Physical Exam   Constitutional: Awake, alert. Resting back in bed with son-in-law at bed in ER.  NAD  Eyes: PERRLA, sclerae anicteric, no conjunctival injection  HENT: NCAT, mucous membranes moist  Neck: Supple, no thyromegaly, no lymphadenopathy, trachea midline  Respiratory: Clear to  auscultation bilaterally to upper lobes.  Slightly decreased to lower lobes and rare exp wheeze to LLL, nonlabored respirations on RA with sats wnl  Cardiovascular: RRR, no murmurs, rubs, or gallops, palpable pedal pulses bilaterally  Gastrointestinal: Positive bowel sounds, soft, nontender, nondistended  Musculoskeletal: BLE 1-2+ pitting edema from shin to toes, no clubbing or cyanosis to extremities.  No obvious areas of erythema or lesion  Psychiatric: Appropriate affect, cooperative and calm  Neurologic: Oriented x to person and date. Confused to location, time and recent events.  Strength symmetric in all extremities, no focal deficits appreciated. speech clear but confused.   Skin: No rashes      Result Review:  I have personally reviewed the results from the time of this admission to 02/02/21 4:38 PM EST and agree with these findings:  [x]  Laboratory  []  Microbiology  [x]  Radiology  [x]  EKG/Telemetry   []  Cardiology/Vascular   []  Pathology  [x]  Old records  [x]  Other: stool guiac negative     Most notable findings include:       LAB RESULTS:      Lab 02/02/21  1245   WBC 11.63*   HEMOGLOBIN 6.2*   HEMATOCRIT 17.7*   PLATELETS 413   NEUTROS ABS 8.57*   IMMATURE GRANS (ABS) 0.08*   LYMPHS ABS 1.91   MONOS ABS 0.96*   EOS ABS 0.07   .6*   PROCALCITONIN 0.15   LACTATE 0.9         Lab 02/02/21  1245   SODIUM 135*   POTASSIUM 5.3*   CHLORIDE 103   CO2 23.0   ANION GAP 9.0   BUN 34*   CREATININE 1.74*   GLUCOSE 115*   CALCIUM 8.8   MAGNESIUM 2.1         Lab 02/02/21  1245   TOTAL PROTEIN 6.8   ALBUMIN 3.40*   GLOBULIN 3.4   ALT (SGPT) 14   AST (SGOT) 23   BILIRUBIN 2.0*   ALK PHOS 84         Lab 02/02/21  1245   PROBNP 706.6   TROPONIN T <0.010                 Brief Urine Lab Results  (Last result in the past 365 days)      Color   Clarity   Blood   Leuk Est   Nitrite   Protein   CREAT   Urine HCG        02/02/21 1301 Dark Yellow Cloudy Moderate (2+) Small (1+) Negative 30 mg/dL (1+)                  Microbiology Results (last 10 days)     Procedure Component Value - Date/Time    COVID PRE-OP / PRE-PROCEDURE SCREENING ORDER (NO ISOLATION) - Swab, Nasopharynx [[id number]]  (Normal) Collected: 02/02/21 1413    Lab Status: Final result Specimen: Swab from Nasopharynx Updated: 02/02/21 1442    Narrative:      The following orders were created for panel order COVID PRE-OP / PRE-PROCEDURE SCREENING ORDER (NO ISOLATION) - Swab, Nasopharynx.  Procedure                               Abnormality         Status                     ---------                               -----------         ------                     COVID-19, ABBOTT IN-HOUS...[[id number]]  Normal              Final result                 Please view results for these tests on the individual orders.    COVID-19, ABBOTT IN-HOUSE,NASAL Swab (NO TRANSPORT MEDIA) 2 HR TAT - Swab, Nasopharynx [[id number]]  (Normal) Collected: 02/02/21 1413    Lab Status: Final result Specimen: Swab from Nasopharynx Updated: 02/02/21 1442     COVID19 Presumptive Negative    Narrative:      Fact sheet for providers: https://www.fda.gov/media/427986/download     Fact sheet for patients: https://www.fda.gov/media/017036/download    Test performed by PCR.  If inconsistent with clinical signs and symptoms patient should be tested with different authorized molecular test.          Ct Head Without Contrast    Result Date: 2/2/2021  EXAMINATION: CT HEAD WO CONTRAST- 02/02/2021  INDICATION: AMS  TECHNIQUE: 5 mm unenhanced images through the brain  The radiation dose reduction device was turned on for each scan per the ALARA (As Low as Reasonably Achievable) protocol.  COMPARISON: NONE  FINDINGS: The calvarium appears intact. There is evidence of previous paranasal sinus surgery, and opacification of much of the remaining paranasal sinuses. There is also trace left mastoid disease. Soft tissue window images show expected degree of generalized cerebral atrophy for age. There is no evidence  of mass or mass effect, edema, infarct, hemorrhage, hydrocephalus, or abnormal extra-axial collection. No gross abnormalities are seen in the orbits.      Impression: Age-appropriate generalized cerebral atrophy. No evidence of acute intracranial disease. Incidentally noted paranasal sinus disease.  D:  02/02/2021 E:  02/02/2021       Xr Chest 1 View    Result Date: 2/2/2021  EXAMINATION: XR CHEST 1 VW-02/02/2021:  INDICATION: Weak/Dizzy/AMS, triage protocol.  COMPARISON: NONE.  FINDINGS: The heart shadow is in the upper range of normal size. The vasculature is cephalized. There is a mild diffuse interstitial disease pattern, resembling early changes of interstitial edema. There is probably a component of chronic pulmonary change as well. In addition, there is focal, rather mild patchy changes of the left lung base that could represent atelectasis or perhaps mild changes of pneumonia. The right lung base appears clear allowing for the overlying shadows of the calcified costal cartilages. The bony structures appear to be intact.      Impression: 1. Pulmonary venous hypertension, and possibly minimal/early interstitial edema. 2. Mild left basilar atelectasis versus early changes of pneumonia.   D:  02/02/2021 E:  02/02/2021              Assessment/Plan   Assessment & Plan     Active Hospital Problems    Diagnosis POA   • **AMS (altered mental status) [R41.82] Unknown   • Hemolytic anemia (CMS/HCC) [D58.9] Yes     Priority: High   • CELY (acute kidney injury) (CMS/HCC) [N17.9] Yes     Priority: High   • Monoclonal gammopathy [D47.2] Yes     Priority: High   • Cold autoimmune hemolytic anemia (CMS/HCC) [D59.12] Yes     Priority: High   • Hypotension [I95.9] Unknown   • Hyperlipidemia [E78.5] Yes     Negrito Joe is a 86 y.o. 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 Pt presents to the ED w son-in-law benito javier of altered mental  status. The son-in-law notes that over the last 2 days the patient has been gradually more confused.  He has been talking about flying planes again (patient was a  in [place]). UA with small leukocytes and moderate WBCs but no nitrites or bacteria.  Chest x-ray with mild left basilar atelectasis versus early changes of pneumonia. Stool guiac was neg and COVID screen neg.  Patient received azithromycin and Rocephin in ER.  IV PPI and IV fluid bolus.  Will admit to hospital medicine service for AMS, CELY, and anemia.  Possible early pneumonia versus UTI.    Assessment/plan:    AMS x 2 days  --per son.  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  Pt unaware that he is confused. Son reports this is new x 2 days.   --CT head with no acute finding  --Likely related to a mild UTI versus mild early pneumonia or bronchitis.    --However will check stat MRI of the brain without to rule out CVA.  Patient last known in his normal state of health 2 days ago.  -further neuro w/u and consult if indicated.  This could all possibly be due to underlying infection.      Anemia  Hx of cold autoimmune hemolytic anemia  Monoclonal gammopathy  --followed by Dr POOL Hurd patient.  No need for consult at this time.  He is available if needed.  --hgb 6.2 in ER. Stool for guiac negative   --I called and spoke with Dr Hurd re pt. Reports to assure his h/h was kept warm and immediately run for accuracy.  IF accurate value then only tx for now is to transfuse.  However, PRBCs MUST BE TRANSFUSED WITH A BLOOD WARMER.  --IVFs as needed okay but should either warm IVF or warm pt (ie warming blankets etc)  --T&C and transfuse 2 units when ready.  --I spoke with Maverick in blood bank and tech in hematology.  Specimen was double ckd and consider accurate.    --awaiting blood ready to transfuse.    --h/h at 1900 (warmed due to autoimmune cold agglutinin hemolytic anemia--called hematology  and updated  and comment in actual order for phlebotomy to see)  --A.m. labs    CELY  Hypotension  dehydration  --s/p 1 L IVF in ER  --monitor labs, tele  --hold any antihypertensives if any on home meds list    Mild leukocytosis  Atelectasis vs early mild PNA changes   Possible UTI  --UA with WBC but no nitrates/bacteria  --Cx pending  --CXR with Lt basilar atelectasis vs changes of early PNA  --started on IV rocephin/azith in ER, continue for now.  Await urine culture and monitor respiratory status.  --trend labs and cxs    BLE chronic edema  --family reports x 10 + years  --no known hx of CHF  --add bnp to ER labs   --may need diuresis esther with getting IVFs and blood     HLD  Asthma  --Home meds, oxygen as needed      DVT prophylaxis: sequentials, No a/c due to anemia      CODE STATUS:    Code Status and Medical Interventions:   Ordered at: 02/02/21 8416     Level Of Support Discussed With:    Patient    Next of Kin (If No Surrogate)     Code Status:    CPR     Medical Interventions (Level of Support Prior to Arrest):    Full     Comments:    per pt and son-in-law       Admission Status:  I believe this patient meets inpatient criteria due to his autoimmune cold autoimmune hemolytic anemia requiring transfusion and also with AMS requiring further workup.    Electronically signed by YURIDIA Munoz, 02/02/21, 5:46 PM EST.      Attending   Admission Attestation       I have seen and examined the patient, performing an independent face-to-face diagnostic evaluation with plan of care reviewed and developed with the advanced practice clinician (APC).      Brief Summary Statement:   Negrito Joe is a 86 y.o. 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  The patient was brought to the ER by his son in law who provided the history.  The patient has become more confused over the last 2 days.  He normally lives alone  with help during the day.  The patient states he feels fine but is clearly confused.    In the ER, creatinine 1.74 (baseline 0.78).  H/H 6.2/17.7 (2 months ago was 11/36).  CT head is nonacute.  COVID negative.  Patient received azithromycin and Rocephin in the ER due to CXR with mild left basilar atelectasis versus early changes of PNA.  No cough or fever though.  UA with small leukocytes and moderate WBC but no bacteria on micro.      Remainder of detailed HPI is as noted by APC and has been reviewed and/or edited by me for completeness.    Attending Physical Exam:  Constitutional: Awake, alert, conversant and pleasant but obviously confused.  Eyes: PERRLA, sclerae anicteric, no conjunctival injection  HENT: NCAT, mucous membranes moist  Neck: Supple, no thyromegaly, no lymphadenopathy, trachea midline  Respiratory: Clear to auscultation bilaterally, nonlabored respirations   Cardiovascular: RRR, no murmurs, rubs, or gallops, palpable pedal pulses bilaterally  Gastrointestinal: Positive bowel sounds, soft, nontender, nondistended  Musculoskeletal: 1+ bilateral ankle edema, no clubbing or cyanosis to extremities  Psychiatric: Appropriate affect, cooperative  Neurologic: Oriented x 1 (person only), strength symmetric in all extremities, Cranial Nerves grossly intact to confrontation, speech clear  Skin: Jaundiced.      Brief Assessment/Plan :  See detailed assessment and plan developed with APC which I have reviewed and/or edited for completeness.    Deborah Church MD  02/02/21

## 2021-02-02 NOTE — H&P (VIEW-ONLY)
"    [facility] Medicine Services  HISTORY AND PHYSICAL    Patient Name: Negrito Joe  : 1934  MRN: [id number]  Primary Care Physician: Dany Astudillo MD  Date of admission: 2021      Subjective   Subjective     Chief Complaint:  AMS    HPI:  Negrito Joe is a 86 y.o. 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 Pt presents to the ED w son-in-law w cc of altered mental status. The son-in-law notes that over the last 2 days the patient has been gradually more confused.  He has been talking about flying planes again (patient was a  in [place]). His wife passed away in 2020. He lives alone and is normally oriented and cares for himself.  Has a caregiver several hrs a day for assist. Patient denies any complaints right now.  He is uncertain where currently is but does not believe that he is confused.  He denies pain, cough, fever, chills, n/v or soa.  States he feels \"okay\" now.  No issues per family except for confusion since yesterday.     On ER eval patient was noted to have CELY with a creatinine of 1.74.  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  Hemoglobin 6.2 and was recently 11.36 in October.  CT head was negative for acute finding.  Negative Covid.  UA with small leukocytes and moderate WBCs but no nitrites or bacteria.  Chest x-ray with mild left basilar atelectasis versus early changes of pneumonia.  Patient received azithromycin and Rocephin in ER.  IV PPI and IV fluid bolus.  Will admit to hospital medicine service for status, CELY, and anemia.  Possible early pneumonia versus UTI.    COVID Details: [x] No Symptoms (none reported by patient or his son-in-law).  Symptoms: [] Fever []  Cough [] Shortness of breath [] Change in taste or smell  Risks:  [] Direct Exposure [] High risk facility   Date of Onset:  ____  Date of first positive " COVID test:     Review of Systems   Constitutional: Positive for activity change and fatigue. Negative for fever.   HENT: Negative.    Respiratory: Negative for cough, shortness of breath and wheezing.    Cardiovascular: Positive for leg swelling.        Patient's son in law reports chronic lower extremity edema for about the last 10 years.  No known CHF.   Gastrointestinal: Negative.    Genitourinary: Negative.    Musculoskeletal: Negative.    Skin: Positive for pallor.   Neurological: Negative.    Hematological: Negative.    Psychiatric/Behavioral: Positive for confusion and decreased concentration.        All other systems reviewed and are negative.     Personal History     Past Medical History:   Diagnosis Date   • Asthma        Past Surgical History:   Procedure Laterality Date   • COLONOSCOPY     • HERNIA REPAIR      1982 & 2014   • SINUS SURGERY      Multiple       Family History: family history includes Aneurysm in his father; Cancer in an other family member; Heart attack in his father. Otherwise pertinent FHx was reviewed and unremarkable.     Social History:  reports that he has never smoked. He has never used smokeless tobacco. He reports current alcohol use. He reports that he does not use drugs.  Social History     Social History Narrative   • Not on file       Medications:  Available home medication information reviewed.  (Not in a hospital admission)      Allergies   Allergen Reactions   • Bactrim [Sulfamethoxazole-Trimethoprim]    • Tetracycline Rash       Objective   Objective     Vital Signs:   Temp:  [96.6 °F (35.9 °C)] 96.6 °F (35.9 °C)  Heart Rate:  [75-96] 92  Resp:  [17] 17  BP: ()/(41-55) 98/54       Physical Exam   Constitutional: Awake, alert. Resting back in bed with son-in-law at bed in ER.  NAD  Eyes: PERRLA, sclerae anicteric, no conjunctival injection  HENT: NCAT, mucous membranes moist  Neck: Supple, no thyromegaly, no lymphadenopathy, trachea midline  Respiratory: Clear to  auscultation bilaterally to upper lobes.  Slightly decreased to lower lobes and rare exp wheeze to LLL, nonlabored respirations on RA with sats wnl  Cardiovascular: RRR, no murmurs, rubs, or gallops, palpable pedal pulses bilaterally  Gastrointestinal: Positive bowel sounds, soft, nontender, nondistended  Musculoskeletal: BLE 1-2+ pitting edema from shin to toes, no clubbing or cyanosis to extremities.  No obvious areas of erythema or lesion  Psychiatric: Appropriate affect, cooperative and calm  Neurologic: Oriented x to person and date. Confused to location, time and recent events.  Strength symmetric in all extremities, no focal deficits appreciated. speech clear but confused.   Skin: No rashes      Result Review:  I have personally reviewed the results from the time of this admission to 02/02/21 4:38 PM EST and agree with these findings:  [x]  Laboratory  []  Microbiology  [x]  Radiology  [x]  EKG/Telemetry   []  Cardiology/Vascular   []  Pathology  [x]  Old records  [x]  Other: stool guiac negative     Most notable findings include:       LAB RESULTS:      Lab 02/02/21  1245   WBC 11.63*   HEMOGLOBIN 6.2*   HEMATOCRIT 17.7*   PLATELETS 413   NEUTROS ABS 8.57*   IMMATURE GRANS (ABS) 0.08*   LYMPHS ABS 1.91   MONOS ABS 0.96*   EOS ABS 0.07   .6*   PROCALCITONIN 0.15   LACTATE 0.9         Lab 02/02/21  1245   SODIUM 135*   POTASSIUM 5.3*   CHLORIDE 103   CO2 23.0   ANION GAP 9.0   BUN 34*   CREATININE 1.74*   GLUCOSE 115*   CALCIUM 8.8   MAGNESIUM 2.1         Lab 02/02/21  1245   TOTAL PROTEIN 6.8   ALBUMIN 3.40*   GLOBULIN 3.4   ALT (SGPT) 14   AST (SGOT) 23   BILIRUBIN 2.0*   ALK PHOS 84         Lab 02/02/21  1245   PROBNP 706.6   TROPONIN T <0.010                 Brief Urine Lab Results  (Last result in the past 365 days)      Color   Clarity   Blood   Leuk Est   Nitrite   Protein   CREAT   Urine HCG        02/02/21 1301 Dark Yellow Cloudy Moderate (2+) Small (1+) Negative 30 mg/dL (1+)                  Microbiology Results (last 10 days)     Procedure Component Value - Date/Time    COVID PRE-OP / PRE-PROCEDURE SCREENING ORDER (NO ISOLATION) - Swab, Nasopharynx [[id number]]  (Normal) Collected: 02/02/21 1413    Lab Status: Final result Specimen: Swab from Nasopharynx Updated: 02/02/21 1442    Narrative:      The following orders were created for panel order COVID PRE-OP / PRE-PROCEDURE SCREENING ORDER (NO ISOLATION) - Swab, Nasopharynx.  Procedure                               Abnormality         Status                     ---------                               -----------         ------                     COVID-19, ABBOTT IN-HOUS...[[id number]]  Normal              Final result                 Please view results for these tests on the individual orders.    COVID-19, ABBOTT IN-HOUSE,NASAL Swab (NO TRANSPORT MEDIA) 2 HR TAT - Swab, Nasopharynx [[id number]]  (Normal) Collected: 02/02/21 1413    Lab Status: Final result Specimen: Swab from Nasopharynx Updated: 02/02/21 1442     COVID19 Presumptive Negative    Narrative:      Fact sheet for providers: https://www.fda.gov/media/620117/download     Fact sheet for patients: https://www.fda.gov/media/645566/download    Test performed by PCR.  If inconsistent with clinical signs and symptoms patient should be tested with different authorized molecular test.          Ct Head Without Contrast    Result Date: 2/2/2021  EXAMINATION: CT HEAD WO CONTRAST- 02/02/2021  INDICATION: AMS  TECHNIQUE: 5 mm unenhanced images through the brain  The radiation dose reduction device was turned on for each scan per the ALARA (As Low as Reasonably Achievable) protocol.  COMPARISON: NONE  FINDINGS: The calvarium appears intact. There is evidence of previous paranasal sinus surgery, and opacification of much of the remaining paranasal sinuses. There is also trace left mastoid disease. Soft tissue window images show expected degree of generalized cerebral atrophy for age. There is no evidence  of mass or mass effect, edema, infarct, hemorrhage, hydrocephalus, or abnormal extra-axial collection. No gross abnormalities are seen in the orbits.      Impression: Age-appropriate generalized cerebral atrophy. No evidence of acute intracranial disease. Incidentally noted paranasal sinus disease.  D:  02/02/2021 E:  02/02/2021       Xr Chest 1 View    Result Date: 2/2/2021  EXAMINATION: XR CHEST 1 VW-02/02/2021:  INDICATION: Weak/Dizzy/AMS, triage protocol.  COMPARISON: NONE.  FINDINGS: The heart shadow is in the upper range of normal size. The vasculature is cephalized. There is a mild diffuse interstitial disease pattern, resembling early changes of interstitial edema. There is probably a component of chronic pulmonary change as well. In addition, there is focal, rather mild patchy changes of the left lung base that could represent atelectasis or perhaps mild changes of pneumonia. The right lung base appears clear allowing for the overlying shadows of the calcified costal cartilages. The bony structures appear to be intact.      Impression: 1. Pulmonary venous hypertension, and possibly minimal/early interstitial edema. 2. Mild left basilar atelectasis versus early changes of pneumonia.   D:  02/02/2021 E:  02/02/2021              Assessment/Plan   Assessment & Plan     Active Hospital Problems    Diagnosis POA   • **AMS (altered mental status) [R41.82] Unknown   • Hemolytic anemia (CMS/HCC) [D58.9] Yes     Priority: High   • CELY (acute kidney injury) (CMS/HCC) [N17.9] Yes     Priority: High   • Monoclonal gammopathy [D47.2] Yes     Priority: High   • Cold autoimmune hemolytic anemia (CMS/HCC) [D59.12] Yes     Priority: High   • Hypotension [I95.9] Unknown   • Hyperlipidemia [E78.5] Yes     Negrito Joe is a 86 y.o. 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 Pt presents to the ED w son-in-law benito javier of altered mental  status. The son-in-law notes that over the last 2 days the patient has been gradually more confused.  He has been talking about flying planes again (patient was a  in [place]). UA with small leukocytes and moderate WBCs but no nitrites or bacteria.  Chest x-ray with mild left basilar atelectasis versus early changes of pneumonia. Stool guiac was neg and COVID screen neg.  Patient received azithromycin and Rocephin in ER.  IV PPI and IV fluid bolus.  Will admit to hospital medicine service for AMS, CELY, and anemia.  Possible early pneumonia versus UTI.    Assessment/plan:    AMS x 2 days  --per son.  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  Pt unaware that he is confused. Son reports this is new x 2 days.   --CT head with no acute finding  --Likely related to a mild UTI versus mild early pneumonia or bronchitis.    --However will check stat MRI of the brain without to rule out CVA.  Patient last known in his normal state of health 2 days ago.  -further neuro w/u and consult if indicated.  This could all possibly be due to underlying infection.      Anemia  Hx of cold autoimmune hemolytic anemia  Monoclonal gammopathy  --followed by Dr POOL Hurd patient.  No need for consult at this time.  He is available if needed.  --hgb 6.2 in ER. Stool for guiac negative   --I called and spoke with Dr Hurd re pt. Reports to assure his h/h was kept warm and immediately run for accuracy.  IF accurate value then only tx for now is to transfuse.  However, PRBCs MUST BE TRANSFUSED WITH A BLOOD WARMER.  --IVFs as needed okay but should either warm IVF or warm pt (ie warming blankets etc)  --T&C and transfuse 2 units when ready.  --I spoke with Maverick in blood bank and tech in hematology.  Specimen was double ckd and consider accurate.    --awaiting blood ready to transfuse.    --h/h at 1900 (warmed due to autoimmune cold agglutinin hemolytic anemia--called hematology  and updated  and comment in actual order for phlebotomy to see)  --A.m. labs    CELY  Hypotension  dehydration  --s/p 1 L IVF in ER  --monitor labs, tele  --hold any antihypertensives if any on home meds list    Mild leukocytosis  Atelectasis vs early mild PNA changes   Possible UTI  --UA with WBC but no nitrates/bacteria  --Cx pending  --CXR with Lt basilar atelectasis vs changes of early PNA  --started on IV rocephin/azith in ER, continue for now.  Await urine culture and monitor respiratory status.  --trend labs and cxs    BLE chronic edema  --family reports x 10 + years  --no known hx of CHF  --add bnp to ER labs   --may need diuresis esther with getting IVFs and blood     HLD  Asthma  --Home meds, oxygen as needed      DVT prophylaxis: sequentials, No a/c due to anemia      CODE STATUS:    Code Status and Medical Interventions:   Ordered at: 02/02/21 9620     Level Of Support Discussed With:    Patient    Next of Kin (If No Surrogate)     Code Status:    CPR     Medical Interventions (Level of Support Prior to Arrest):    Full     Comments:    per pt and son-in-law       Admission Status:  I believe this patient meets inpatient criteria due to his autoimmune cold autoimmune hemolytic anemia requiring transfusion and also with AMS requiring further workup.    Electronically signed by YURIDIA Munoz, 02/02/21, 5:46 PM EST.      Attending   Admission Attestation       I have seen and examined the patient, performing an independent face-to-face diagnostic evaluation with plan of care reviewed and developed with the advanced practice clinician (APC).      Brief Summary Statement:   Negrito Joe is a 86 y.o. 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  The patient was brought to the ER by his son in law who provided the history.  The patient has become more confused over the last 2 days.  He normally lives alone  with help during the day.  The patient states he feels fine but is clearly confused.    In the ER, creatinine 1.74 (baseline 0.78).  H/H 6.2/17.7 (2 months ago was 11/36).  CT head is nonacute.  COVID negative.  Patient received azithromycin and Rocephin in the ER due to CXR with mild left basilar atelectasis versus early changes of PNA.  No cough or fever though.  UA with small leukocytes and moderate WBC but no bacteria on micro.      Remainder of detailed HPI is as noted by APC and has been reviewed and/or edited by me for completeness.    Attending Physical Exam:  Constitutional: Awake, alert, conversant and pleasant but obviously confused.  Eyes: PERRLA, sclerae anicteric, no conjunctival injection  HENT: NCAT, mucous membranes moist  Neck: Supple, no thyromegaly, no lymphadenopathy, trachea midline  Respiratory: Clear to auscultation bilaterally, nonlabored respirations   Cardiovascular: RRR, no murmurs, rubs, or gallops, palpable pedal pulses bilaterally  Gastrointestinal: Positive bowel sounds, soft, nontender, nondistended  Musculoskeletal: 1+ bilateral ankle edema, no clubbing or cyanosis to extremities  Psychiatric: Appropriate affect, cooperative  Neurologic: Oriented x 1 (person only), strength symmetric in all extremities, Cranial Nerves grossly intact to confrontation, speech clear  Skin: Jaundiced.      Brief Assessment/Plan :  See detailed assessment and plan developed with APC which I have reviewed and/or edited for completeness.    Deborah Church MD  02/02/21

## 2021-02-03 NOTE — PROGRESS NOTES
[facility] Medicine Services  PROGRESS NOTE    Patient Name: Negrito Joe  : 1934  MRN: [id number]    Date of Admission: 2021  Primary Care Physician: Dany Astudillo MD    Subjective   Subjective     CC:  AMS    HPI:  Confused overnight and was given ativan so would hold still for MRI.  Son in law at bedside.  Patient heavily sedated still, will arouse but goes back to sleep    ROS:  Unable to obtain d/t AMS    Objective   Objective     Vital Signs:   Temp:  [96.6 °F (35.9 °C)-99.6 °F (37.6 °C)] 98.8 °F (37.1 °C)  Heart Rate:  [] 76  Resp:  [16-22] 18  BP: ()/(39-67) 97/47        Physical Exam:  Constitutional: No acute distress, sedated, will arouse but goes back to sleep, son in law at bedside  HENT: NCAT, mucous membranes moist  Respiratory: Clear to auscultation bilaterally, respiratory effort normal   Cardiovascular: RRR, no murmurs, rubs, or gallops  Gastrointestinal: Positive bowel sounds, soft, nontender, nondistended  Musculoskeletal: No bilateral ankle edema  Psychiatric: Appropriate affect, cooperative  Neurologic: sedated from ativan, will arouse but goes back to sleep  Skin: No rashes      Results Reviewed:  Results from last 7 days   Lab Units 21  0547 21  18521  1245   WBC 10*3/mm3 9.16  --  11.63*   HEMOGLOBIN g/dL 7.0* 5.5* 6.2*   HEMATOCRIT % 21.1* 16.5* 17.7*   PLATELETS 10*3/mm3 387  --  413   PROCALCITONIN ng/mL  --   --  0.15     Results from last 7 days   Lab Units 21  0547 21  18521  1245   SODIUM mmol/L 143  --  135*   POTASSIUM mmol/L 4.5  --  5.3*   CHLORIDE mmol/L 111*  --  103   CO2 mmol/L 23.0  --  23.0   BUN mg/dL 28*  --  34*   CREATININE mg/dL 1.59*  --  1.74*   GLUCOSE mg/dL 97  --  115*   CALCIUM mg/dL 8.3*  --  8.8   ALT (SGPT) U/L 11  --  14   AST (SGOT) U/L 19  --  23   TROPONIN T ng/mL  --   --  <0.010   PROBNP pg/mL  --  702.5 706.6     Estimated Creatinine Clearance: 32.2 mL/min  (A) (by C-G formula based on SCr of 1.59 mg/dL (H)).    Microbiology Results Abnormal     Procedure Component Value - Date/Time    COVID PRE-OP / PRE-PROCEDURE SCREENING ORDER (NO ISOLATION) - Swab, Nasopharynx [[id number]]  (Normal) Collected: 02/02/21 1413    Lab Status: Final result Specimen: Swab from Nasopharynx Updated: 02/02/21 1442    Narrative:      The following orders were created for panel order COVID PRE-OP / PRE-PROCEDURE SCREENING ORDER (NO ISOLATION) - Swab, Nasopharynx.  Procedure                               Abnormality         Status                     ---------                               -----------         ------                     COVID-19, ABBOTT IN-HOUS...[[id number]]  Normal              Final result                 Please view results for these tests on the individual orders.    COVID-19, ABBOTT IN-HOUSE,NASAL Swab (NO TRANSPORT MEDIA) 2 HR TAT - Swab, Nasopharynx [[id number]]  (Normal) Collected: 02/02/21 1413    Lab Status: Final result Specimen: Swab from Nasopharynx Updated: 02/02/21 1442     COVID19 Presumptive Negative    Narrative:      Fact sheet for providers: https://www.fda.gov/media/987963/download     Fact sheet for patients: https://www.fda.gov/media/572881/download    Test performed by PCR.  If inconsistent with clinical signs and symptoms patient should be tested with different authorized molecular test.          Imaging Results (Last 24 Hours)     Procedure Component Value Units Date/Time    MRI Brain Without Contrast [[id number]] Resulted: 02/02/21 2034     Updated: 02/03/21 0907    XR Chest 1 View [[id number]] Collected: 02/02/21 1343     Updated: 02/02/21 2239    Narrative:      EXAMINATION: XR CHEST 1 VW-02/02/2021:     INDICATION: Weak/Dizzy/AMS, triage protocol.     COMPARISON: NONE.     FINDINGS: The heart shadow is in the upper range of normal size. The  vasculature is cephalized. There is a mild diffuse interstitial disease  pattern, resembling early changes of  interstitial edema. There is  probably a component of chronic pulmonary change as well. In addition,  there is focal, rather mild patchy change of the left lung base that  could represent atelectasis or perhaps mild changes of pneumonia. The  right lung base appears clear allowing for the overlying shadows of the  calcified costal cartilages. The bony structures appear to be intact.       Impression:      1. Pulmonary venous hypertension, and possibly minimal/early  interstitial edema.  2. Mild left basilar atelectasis versus early changes of pneumonia.      D:  02/02/2021  E:  02/02/2021     This report was finalized on 2/2/2021 10:36 PM by Dr. Christopher Head MD.       CT Head Without Contrast [[id number]] Collected: 02/02/21 1401     Updated: 02/02/21 1406    Narrative:      EXAMINATION: CT HEAD WO CONTRAST- 02/02/2021     INDICATION: AMS     TECHNIQUE: 5 mm unenhanced images through the brain     The radiation dose reduction device was turned on for each scan per the  ALARA (As Low as Reasonably Achievable) protocol.     COMPARISON: NONE     FINDINGS: The calvarium appears intact. There is evidence of previous  paranasal sinus surgery, and opacification of much of the remaining  paranasal sinuses. There is also trace left mastoid disease. Soft tissue  window images show expected degree of generalized cerebral atrophy for  age. There is no evidence of mass or mass effect, edema, infarct,  hemorrhage, hydrocephalus, or abnormal extra-axial collection. No gross  abnormalities are seen in the orbits.       Impression:      Age-appropriate generalized cerebral atrophy. No evidence of  acute intracranial disease. Incidentally noted paranasal sinus disease.     D:  02/02/2021  E:  02/02/2021                     I have reviewed the medications:  Scheduled Meds:azithromycin, 500 mg, Intravenous, Q24H  cefTRIAXone, 1 g, Intravenous, Q24H  sodium chloride, 10 mL, Intravenous, Q12H      Continuous Infusions:sodium chloride, 50  mL/hr, Last Rate: 50 mL/hr (02/02/21 1819)      PRN Meds:.•  acetaminophen **OR** acetaminophen **OR** acetaminophen  •  sodium chloride    Assessment/Plan   Assessment & Plan     Active Hospital Problems    Diagnosis  POA   • **AMS (altered mental status) [R41.82]  Unknown   • Hemolytic anemia (CMS/HCC) [D58.9]  Yes   • CELY (acute kidney injury) (CMS/HCC) [N17.9]  Yes   • Hypotension [I95.9]  Unknown   • Monoclonal gammopathy [D47.2]  Yes   • Hyperlipidemia [E78.5]  Yes   • 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        Brief Hospital Course to date:  Negrito Joe is a 86 y.o. 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  The patient was brought to the ER by his son in law who provided the history.  The patient has become more confused over the last 2 days.  He normally lives alone with help during the day.  On admit patient was without complaints but confused.     In the ER, creatinine 1.74 (baseline 0.78).  H/H 6.2/17.7 (2 months ago was 11/36).  CT head is nonacute.  COVID negative.  Patient received azithromycin and Rocephin in the ER due to CXR with mild left basilar atelectasis versus early changes of PNA.  No cough or fever though.  UA with small leukocytes and moderate WBC but no bacteria on micro.      AMS x 2 days PTA  --per son.  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  Pt unaware that he is confused. Son in law reports this is new x 2 days, reports 1 similar episode and was his AI hemolytic anemia then  --CT head with no acute finding  --Likely related to a mild UTI versus mild early pneumonia or bronchitis.    --MRI was limited as only diffusion sequences obtained,  No acute infarct        Anemia  Hx of cold autoimmune hemolytic anemia  Monoclonal gammopathy  --followed by Dr LANZA  [place] patient, will consult as family would like to see.  --hgb as low as 5.5 in ER. Stool for guiac negative, specimen was double checked and considered accurate, s/p 2units prbc on 2021   -Per Dr Hurd should assure his h/h was kept warm and immediately run for accuracy.  IF accurate value then only tx for now is to transfuse.  However, PRBCs MUST BE TRANSFUSED WITH A BLOOD WARMER.  --IVFs as needed okay but should either warm IVF or warm pt (ie warming blankets etc)  --per update from patient's PCP/Dr. Astudillo his daughter reports patient has been standing outside without coat talking with neighbor and Dr. Hurd agrees could explain hemolysis     CELY  Hypotension  dehydration  -continues to be mildly hypotensive, will increase IVF NS from 50/hr to 75/hr, must keep warm       Mild leukocytosis  Atelectasis vs early mild PNA changes   Possible UTI  --Cx pending  --CXR with Lt basilar atelectasis vs changes of early PNA  --V rocephin/azith Day 2.  Await urine culture and monitor respiratory status.       BLE chronic edema  --family reports x 10 + years  --no known hx of CHF  --add bnp to ER labs   --may need diuresis esther with getting IVFs and blood      HLD  Asthma  --Home meds, oxygen as needed    Bereavement  -passing of his wife 2020, her  is next  at [facility]      DVT prophylaxis: sequentials, No a/c due to anemia        Disposition: I expect the patient to be discharged TBD    CODE STATUS:   Code Status and Medical Interventions:   Ordered at: 21 4066     Level Of Support Discussed With:    Patient    Next of Kin (If No Surrogate)     Code Status:    CPR     Medical Interventions (Level of Support Prior to Arrest):    Full       Emanuel Carreno MD  21

## 2021-02-03 NOTE — PLAN OF CARE
Pt only oriented to self, RA, hypotensive, tele shows NSR on monitor with PVCs, PACs and what appear to be pauses (tele strip printed and in chart). Pt was very restless and agitated this shift, became increasingly confused throughout the night, unable to complete MRI (provider aware). Speech garbled and illogical. 0.5 mg Ativan given for agitation, pt pulled out multiple IVs, continuously tried to get out of the bed, resisting staff. 2u PRBCs given via blood warmer, am labs ordered. NS going @50mL/hr. Will continue to monitor.

## 2021-02-03 NOTE — CONSULTS
HEMATOLOGY/ONCOLOGY INPATIENT CONSULT    REASON FOR CONSULT: anemia    Subjective   HISTORY OF PRESENT ILLNESS; I am asked to see this 86 y.o.  male with past medical history significant for cold autoimmune hemolytic anemia, monoclonal gammopathy, asthma and HLD.  He was brought to the ED for confusion.  He was found to have acute kidney injury and anemia with hemoglobin 6.2. Imaging of head was unremarkable.  He was started on antibiotic for possible UTI and transfused 2 units of PRBCs.  Patient was sleeping when seen.  He would arouse but went right back to sleep.  No family at bedside.  Hemoglobin 7.0 today.  Continuing to receive warm IV fluids and remains mildly hypotensive.  Creatinine 1.59 today.        Past Medical History:   Diagnosis Date   • Asthma      Past Surgical History:   Procedure Laterality Date   • COLONOSCOPY     • HERNIA REPAIR      1982 & 2014   • SINUS SURGERY      Multiple       No current facility-administered medications on file prior to encounter.      Current Outpatient Medications on File Prior to Encounter   Medication Sig Dispense Refill   • Azelastine-Fluticasone 137-50 MCG/ACT suspension 1 spray into the nostril(s) as directed by provider 2 (Two) Times a Day As Needed.     • donepezil (ARICEPT) 10 MG tablet Take 10 mg by mouth Every Night.     • Fluticasone-Umeclidin-Vilant 100-62.5-25 MCG/INH aerosol powder  Inhale 1 puff Daily.     • Multiple Vitamins-Minerals (PRESERVISION AREDS PO) Take 1 tablet by mouth 2 (Two) Times a Day.     • pravastatin (PRAVACHOL) 40 MG tablet Take 40 mg by mouth Every Night.     • Tiotropium Bromide Monohydrate (SPIRIVA RESPIMAT) 1.25 MCG/ACT aerosol solution inhaler Inhale 2 puffs Daily.         Allergies   Allergen Reactions   • Bactrim [Sulfamethoxazole-Trimethoprim]    • Tetracycline Rash       Social History     Socioeconomic History   • Marital status:      Spouse name: Not on file   • Number of children: Not on file   • Years of education:  "Not on file   • Highest education level: Not on file   Tobacco Use   • Smoking status: Never Smoker   • Smokeless tobacco: Never Used   Substance and Sexual Activity   • Alcohol use: Yes     Comment: occ- 4x/monthly   • Drug use: No   • Sexual activity: Defer       Family History   Problem Relation Age of Onset   • Aneurysm Father    • Heart attack Father    • Cancer Other          REVIEW OF SYSTEMS:  A 14 point review of systems was performed and is negative except as noted above.    Objective   PHYSICAL EXAM:    BP 92/48 (BP Location: Left arm, Patient Position: Lying)   Pulse 75   Temp 99 °F (37.2 °C) (Axillary)   Resp 18   Ht 177.8 cm (70\")   Wt 68.3 kg (150 lb 8 oz)   SpO2 100%   BMI 21.59 kg/m²       General: sleeping in bed, no acute distress, no family at bedside  Neck: Supple.   Cardiovascular: regular rate and rhythm, no murmurs  Lungs: clear to auscultation bilaterally. No respiratory distress  Abdomen: soft, nontender, nondistended.    Extremities: no lower extremity edema, cyanosis, or clubbing  Skin: no rashes, lesions, bruising, or petechiae  Neuro: Sleeping, will arouse but goes back to sleep    Results:    Results from last 7 days   Lab Units 02/03/21  0547 02/02/21  1850 02/02/21  1245   WBC 10*3/mm3 9.16  --  11.63*   HEMOGLOBIN g/dL 7.0* 5.5* 6.2*   PLATELETS 10*3/mm3 387  --  413     Results from last 7 days   Lab Units 02/03/21  0547 02/02/21  1245   SODIUM mmol/L 143 135*   POTASSIUM mmol/L 4.5 5.3*   CO2 mmol/L 23.0 23.0   BUN mg/dL 28* 34*   CREATININE mg/dL 1.59* 1.74*   GLUCOSE mg/dL 97 115*     Results from last 7 days   Lab Units 02/03/21  0547 02/02/21  1245   AST (SGOT) U/L 19 23   ALT (SGPT) U/L 11 14   BILIRUBIN mg/dL 2.8* 2.0*   ALK PHOS U/L 71 84         Ct Head Without Contrast    Result Date: 2/2/2021  Age-appropriate generalized cerebral atrophy. No evidence of acute intracranial disease. Incidentally noted paranasal sinus disease.  D:  02/02/2021 E:  02/02/2021       Mri " Brain Without Contrast    Result Date: 2/3/2021  Limited MRI with diffusion sequences only obtained.No acute infarct is noted. Redemonstrated diffuse volume loss. No obvious global mass effect, midline shift or increased ventricular caliber.   This report was finalized on 2/3/2021 9:33 AM by Magdaleno Tenorio.      Xr Chest 1 View    Result Date: 2/2/2021  1. Pulmonary venous hypertension, and possibly minimal/early interstitial edema. 2. Mild left basilar atelectasis versus early changes of pneumonia.   D:  02/02/2021 E:  02/02/2021  This report was finalized on 2/2/2021 10:36 PM by Dr. Christopher Head MD.        Assessment    ASSESSMENT & PLAN:  1.  Cold autoimmune hemolytic anemia  2.  Monoclonal gammopathy  3.  Acute kidney injury    Discussion:  Patient sleeping and no family at bedside, unsure if his mental status is back to baseline.  Discussed briefly with Dr. Hurd who does not recommend any steroids or further work up.  Recommend to continue transfusions as needed with a blood warmer.  Continue IV fluids and antibiotics per hospitalist, cultures pending.  Dr. Hurd will follow up with patient tomorrow.      Carmen Riley, APRN    2/3/2021

## 2021-02-03 NOTE — NURSING NOTE
Wocn consulted for:  Gluteal cleft skin tear    Wound presentation: no wound visualized; area is slightly pink, blanchable and intact; z-guard placed.    Head to toe Ax not performed per nurse request as patient has been very agitated and confused.    Additional skin issues: per charting, patient has bilateral stage one heel ulcers. wocn unable to assess at this time per nurse request. Interventions are to apply silicone foam and offloading boots if patient will allow. Otherwise keep heels offloaded with pillows as much as possible. Patient family member educated on this.     Waffle in place. Skin interventions in place.    Specialty bed: no     Wocn will follow. Please contact with questions or concerns.

## 2021-02-03 NOTE — NURSING NOTE
Called security and pt's cell phone, wallet, watch, two yellow colored rings, and cash, were taken to be locked up in the safe.

## 2021-02-04 NOTE — PLAN OF CARE
Goal Outcome Evaluation:  Plan of Care Reviewed With: patient, daughter  Progress: improving  Outcome Summary: PT eval complete.  Pt presents with reeduced mobility and generalized weakness warranting skilled IPPT services.  Pt CGA for STS with HHA.  Pt ambulated 50' with min A, HHA, and mild instability that improves with distance.  Pt would benefit from trial with RW next visit.  Recommend home with 24/7 care, home health, and RW.

## 2021-02-04 NOTE — PROGRESS NOTES
Continued Stay Note  [facility]     Patient Name: Negrito Joe  MRN: [id number]  Today's Date: 2/4/2021    Admit Date: 2/2/2021    Discharge Plan     Row Name 02/04/21 1310       Plan    Plan  Home with Family    Patient/Family in Agreement with Plan  yes    Plan Comments  I spoke with Mr. Joe and his daughter, Leonila Hua at the bedside today regarding discharge plans.  OT has recommended rehab vs. Home health and the PT consult is pending.  I discussed with Leonila this recommendation and she requests to have [facility] provide services for him at her home.  She plans to take him to her home at discharge and assist with his care.  She mentions that she already uses [facility] CNA services for him at his home 2 hrs each day.  Case Management will follow up with his plan of care and discuss with he and his daughter  recommendations as they are available.    Final Discharge Disposition Code  06 - home with home health care        Discharge Codes    No documentation.             Gian Manzo RN

## 2021-02-04 NOTE — PLAN OF CARE
Goal Outcome Evaluation:        Pt rested up in the chair for entirety of shift. Stayed awake all day. No complaints of pain, VSS, BP improving. Pt remained confused for entire shift, does have moments of being appropriate, but is mostly confused. Daughter remained at bedside. Pts H and H improved slightly today, [facility]. Worked with PT. No other issues, will continue to monitor.

## 2021-02-04 NOTE — NURSING NOTE
wocn follow up for documented PI's to bilateral heels.     wocn unable to visualize yesterday due to patient condition. Patient assessed today as very pleasant and cooperative. No heel wounds present. No redness, heels are intact blanching with no discoloration.     wocn will sign off. Please contact with questions or concerns.

## 2021-02-04 NOTE — THERAPY EVALUATION
Patient Name: Negrito Joe  : 1934    MRN: [id number]                              Today's Date: 2021       Admit Date: 2021    Visit Dx:     ICD-10-CM ICD-9-CM   1. Altered mental status, unspecified altered mental status type  R41.82 780.97   2. Symptomatic anemia  D64.9 285.9   3. Dehydration  E86.0 276.51     Patient Active Problem List   Diagnosis   • Macrocytic anemia   • Cold autoimmune hemolytic anemia (CMS/HCC)   • Asthma   • Hyperlipidemia   • Monoclonal gammopathy   • Hemolytic anemia (CMS/HCC)   • CELY (acute kidney injury) (CMS/HCC)   • AMS (altered mental status)   • Hypotension     Past Medical History:   Diagnosis Date   • Asthma      Past Surgical History:   Procedure Laterality Date   • COLONOSCOPY     • HERNIA REPAIR       &    • SINUS SURGERY      Multiple     General Information     Row Name 2109          OT Time and Intention    Document Type  evaluation  -     Mode of Treatment  occupational therapy  -     Row Name 21 09          General Information    Patient Profile Reviewed  yes  -LC     Prior Level of Function  independent:;all household mobility;transfer;ADL's  -     Existing Precautions/Restrictions  fall  -LC     Row Name 21 09          Living Environment    Lives With  alone  -     Row Name 21 0909          Home Main Entrance    Number of Stairs, Main Entrance  two  -LC     Row Name 21 0909          Stairs Within Home, Primary    Number of Stairs, Within Home, Primary  none  -LC     Row Name 21 09          Cognition    Orientation Status (Cognition)  oriented to;person;time  -     Row Name 21          Safety Issues, Functional Mobility    Safety Issues Affecting Function (Mobility)  awareness of need for assistance;insight into deficits/self-awareness;safety precaution awareness;safety precautions follow-through/compliance;sequencing abilities  -     Impairments Affecting Function (Mobility)   balance;endurance/activity tolerance;strength  -       User Key  (r) = Recorded By, (t) = Taken By, (c) = Cosigned By    Initials Name Provider Type     Violeta Staton, MIKAL Occupational Therapist          Mobility/ADL's     Row Name 02/04/21 0917          Bed Mobility    Comment (Bed Mobility)  [facility] on arrival  -     Row Name 02/04/21 0917          Transfers    Transfers  sit-stand transfer;toilet transfer  -     Comment (Transfers)  VC's for hand placement and sequencing.  -     Sit-Stand [place] (Transfers)  minimum assist (75% patient effort);verbal cues  -     [place] Level (Toilet Transfer)  minimum assist (75% patient effort);verbal cues  -     Assistive Device (Toilet Transfer)  commode, bedside without drop arms BUE support, Gait belt  -     Row Name 02/04/21 0917          Sit-Stand Transfer    Assistive Device (Sit-Stand Transfers)  other (see comments) BUE support  -     Row Name 02/04/21 0917          Toilet Transfer    Type (Toilet Transfer)  stand pivot/stand step  -     Row Name 02/04/21 0917          Activities of Daily Living    11693 - OT Self Care/Mgmt Minutes  12  -     BADL Assessment/Intervention  lower body dressing;toileting;grooming  -     Row Name 02/04/21 0917          Lower Body Dressing Assessment/Training    [place] Level (Lower Body Dressing)  don;doff;socks;minimum assist (75% patient effort)  -     Position (Lower Body Dressing)  unsupported sitting  -     Row Name 02/04/21 0917          Toileting Assessment/Training    [place] Level (Toileting)  adjust/manage clothing;change pad/brief;moderate assist (50% patient effort)  -     Assistive Devices (Toileting)  commode, bedside without drop arms  -     Position (Toileting)  supported sitting  -     Row Name 02/04/21 0917          Grooming Assessment/Training    [place] Level (Grooming)  wash face, hands;set up  -     Position (Grooming)  supported sitting  -       User Key   (r) = Recorded By, (t) = Taken By, (c) = Cosigned By    Initials Name Provider Type     Violeta Staton OT Occupational Therapist        Obj/Interventions     Row Name 02/04/21 0922          Sensory Assessment (Somatosensory)    Sensory Assessment (Somatosensory)  UE sensation intact  -[facility] Name 02/04/21 0922          Range of Motion Comprehensive    General Range of Motion  bilateral upper extremity ROM WFL  -[facility] Name 02/04/21 0922          Strength Comprehensive (MMT)    General Manual Muscle Testing (MMT) Assessment  upper extremity strength deficits identified  -     Comment, General Manual Muscle Testing (MMT) Assessment  BUE grossly 3+/5  -     Row Name 02/04/21 0922          Balance    Balance Assessment  sitting static balance;sitting dynamic balance;standing static balance;standing dynamic balance  -     Static Sitting Balance  WNL;unsupported;sitting in chair  -     Dynamic Sitting Balance  WFL;unsupported;sitting in chair  -     Static Standing Balance  mild impairment;supported;standing  -     Dynamic Standing Balance  mild impairment;supported;standing  -     Balance Interventions  sitting;standing;sit to stand;occupation based/functional task  -       User Key  (r) = Recorded By, (t) = Taken By, (c) = Cosigned By    Initials Name Provider Type     Violeta Staton OT Occupational Therapist        Goals/Plan     [facility] Name 02/04/21 1008          Transfer Goal 1 (OT)    Activity/Assistive Device (Transfer Goal 1, OT)  sit-to-stand/stand-to-sit;bed-to-chair/chair-to-bed;walker, rolling  -     [place] Level/Cues Needed (Transfer Goal 1, OT)  supervision required  -     Time Frame (Transfer Goal 1, OT)  10 days;by discharge  -     Progress/Outcome (Transfer Goal 1, OT)  goal ongoing  -[facility] Name 02/04/21 1008          Dressing Goal 1 (OT)    Activity/Device (Dressing Goal 1, OT)  lower body dressing  -     [place]/Cues Needed (Dressing Goal 1, OT)   supervision required;verbal cues required  -     Time Frame (Dressing Goal 1, OT)  10 days;by discharge  -     Progress/Outcome (Dressing Goal 1, OT)  goal ongoing  -     Row Name 02/04/21 1008          Toileting Goal 1 (OT)    Activity/Device (Toileting Goal 1, OT)  adjust/manage clothing;perform perineal hygiene;commode;grab bar/safety frame  -     [place] Level/Cues Needed (Toileting Goal 1, OT)  supervision required;verbal cues required  -     Time Frame (Toileting Goal 1, OT)  10 days;by discharge  -     Progress/Outcome (Toileting Goal 1, OT)  goal ongoing  -       User Key  (r) = Recorded By, (t) = Taken By, (c) = Cosigned By    Initials Name Provider Type    Violeta Savage, MIKAL Occupational Therapist        Clinical Impression     Row Name 02/04/21 1004          Pain Assessment    Additional Documentation  Pain Scale: Numbers Pre/Post-Treatment (Group)  -[facility] Name 02/04/21 1004          Pain Scale: Numbers Pre/Post-Treatment    Pretreatment Pain Rating  0/10 - no pain  -     Posttreatment Pain Rating  0/10 - no pain  -     Row Name 02/04/21 1004          Plan of Care Review    Plan of Care Reviewed With  patient;daughter  -     Progress  -- OT eval  -     Outcome Summary  OT eval completed. Pt. presents with impaired activity tolerance, general weakness, and a decline in ADL performance. Pt. demonstrated T/Fs with Min A. Complete LBD with Min A, toileting with Mod A and grooming with SUA. Skilled OT services warranted to promote return to PLOF. Recommend SNF vs.  pending progress.  -     Row Name 02/04/21 1004          Therapy Assessment/Plan (OT)    Therapy Frequency (OT)  daily  -     Row Name 02/04/21 1004          Therapy Plan Review/Discharge Plan (OT)    Anticipated Discharge Disposition (OT)  home with assist;home with home health;skilled nursing facility  -     Row Name 02/04/21 1004          Vital Signs    Pre Systolic BP Rehab  -- VSS  -     Pre Patient  Position  Sitting  -     Intra Patient Position  Standing  -     Post Patient Position  Sitting  -     Row Name 02/04/21 1004          Positioning and Restraints    Pre-Treatment Position  sitting in chair/recliner  -     Post Treatment Position  chair  -LC     In Chair  notified nsg;reclined;call light within reach;encouraged to call for assist;exit alarm on;legs elevated  -       User Key  (r) = Recorded By, (t) = Taken By, (c) = Cosigned By    Initials Name Provider Type    Violeta Savage OT Occupational Therapist        Outcome Measures     Row Name 02/04/21 1013          How much help from another is currently needed...    Putting on and taking off regular lower body clothing?  3  -LC     Bathing (including washing, rinsing, and drying)  2  -LC     Toileting (which includes using toilet bed pan or urinal)  2  -LC     Putting on and taking off regular upper body clothing  3  -LC     Taking care of personal grooming (such as brushing teeth)  3  -LC     Eating meals  3  -     AM-PAC 6 Clicks Score (OT)  16  -     Row Name 02/04/21 1013          Functional Assessment    Outcome Measure Options  AM-PAC 6 Clicks Daily Activity (OT)  -       User Key  (r) = Recorded By, (t) = Taken By, (c) = Cosigned By    Initials Name Provider Type    Violeta Savage OT Occupational Therapist        Occupational Therapy Education                 Title: PT OT SLP Therapies (In Progress)     Topic: Occupational Therapy (In Progress)     Point: ADL training (Done)     Description:   Instruct learner(s) on proper safety adaptation and remediation techniques during self care or transfers.   Instruct in proper use of assistive devices.              Learning Progress Summary           Patient Acceptance, E, VU,NR by  at 2/4/2021 0817                   Point: Home exercise program (Not Started)     Description:   Instruct learner(s) on appropriate technique for monitoring, assisting and/or progressing therapeutic  exercises/activities.              Learner Progress:  Not documented in this visit.          Point: Precautions (Done)     Description:   Instruct learner(s) on prescribed precautions during self-care and functional transfers.              Learning Progress Summary           Patient Acceptance, E, VU,NR by  at 2/4/2021 0817                   Point: Body mechanics (Done)     Description:   Instruct learner(s) on proper positioning and spine alignment during self-care, functional mobility activities and/or exercises.              Learning Progress Summary           Patient Acceptance, E, VU,NR by  at 2/4/2021 0817                               User Key     Initials Effective Dates Name Provider Type [facility] 07/18/19 -  Violeta Staton OT Occupational Therapist OT              OT Recommendation and Plan  Therapy Frequency (OT): daily  Plan of Care Review  Plan of Care Reviewed With: patient, daughter  Progress: (OT eval)  Outcome Summary: OT eval completed. Pt. presents with impaired activity tolerance, general weakness, and a decline in ADL performance. Pt. demonstrated T/Fs with Min A. Complete LBD with Min A, toileting with Mod A and grooming with SUA. Skilled OT services warranted to promote return to PLOF. Recommend SNF vs. HH pending progress.     Time Calculation:   Time Calculation- OT     Row Name 02/04/21 0917             Time Calculation- OT    OT Start Time  0817  -      Total Timed Code Minutes- OT  12 minute(s)  -      OT Received On  02/04/21  -      OT Goal Re-Cert Due Date  02/14/21  -         Timed Charges    54081 - OT Self Care/Mgmt Minutes  12  -        User Key  (r) = Recorded By, (t) = Taken By, (c) = Cosigned By    Initials Name Provider Type     Violeta Staton OT Occupational Therapist        Therapy Charges for Today     Code Description Service Date Service Provider Modifiers Qty    58980640358 HC OT SELF CARE/MGMT/TRAIN EA 15 MIN 2/4/2021 Violeta Staton OT GO 1     [id number]  OT EVAL LOW COMPLEXITY 3 2/4/2021 Violeta Staton, MIKAL GO 1               Violeta Staton OT  2/4/2021

## 2021-02-04 NOTE — PLAN OF CARE
Goal Outcome Evaluation:  Plan of Care Reviewed With: patient, daughter  Progress: (OT eval)  Outcome Summary: OT eval completed. Pt. presents with impaired activity tolerance, general weakness, and a decline in ADL performance. Pt. demonstrated T/Fs with Min A. Complete LBD with Min A, toileting with Mod A and grooming with SUA. Skilled OT services warranted to promote return to PLOF. Recommend SNF vs. HH pending progress.

## 2021-02-04 NOTE — THERAPY EVALUATION
Patient Name: Negrito Joe  : 1934    MRN: [id number]                              Today's Date: 2021       Admit Date: 2021    Visit Dx:     ICD-10-CM ICD-9-CM   1. Altered mental status, unspecified altered mental status type  R41.82 780.97   2. Symptomatic anemia  D64.9 285.9   3. Dehydration  E86.0 276.51     Patient Active Problem List   Diagnosis   • Macrocytic anemia   • Cold autoimmune hemolytic anemia (CMS/HCC)   • Asthma   • Hyperlipidemia   • Monoclonal gammopathy   • Hemolytic anemia (CMS/HCC)   • CELY (acute kidney injury) (CMS/HCC)   • AMS (altered mental status)   • Hypotension     Past Medical History:   Diagnosis Date   • Asthma      Past Surgical History:   Procedure Laterality Date   • COLONOSCOPY     • HERNIA REPAIR       &    • SINUS SURGERY      Multiple     General Information     Row Name 21 [facility]8          Physical Therapy Time and Intention    Document Type  evaluation  -AA     Mode of Treatment  physical therapy  -AA     Row Name 21 1338          General Information    Patient Profile Reviewed  yes  -AA     Prior Level of Function  independent:;all household mobility  -AA     Existing Precautions/Restrictions  fall  -AA     Barriers to Rehab  medically complex;cognitive status  -AA     Row Name 21 1337          Living Environment    Lives With  alone  -AA     Row Name 21 1337          Home Main Entrance    Number of Stairs, Main Entrance  none pt will DC to daughters home and will not have to perform stairs  -AA     Row Name 21 1334          Cognition    Orientation Status (Cognition)  oriented to;person  -AA     Row Name 21 1335          Safety Issues, Functional Mobility    Safety Issues Affecting Function (Mobility)  awareness of need for assistance;insight into deficits/self-awareness;judgment;safety precaution awareness;problem-solving;safety precautions follow-through/compliance;sequencing abilities  -AA     Impairments  Affecting Function (Mobility)  balance;endurance/activity tolerance;strength  -AA       User Key  (r) = Recorded By, (t) = Taken By, (c) = Cosigned By    Initials Name Provider Type    Yue Bowie, PT Physical Therapist        Mobility     Row Name 02/04/21 1337          Bed Mobility    Comment (Bed Mobility)  UIC  -AA     Row Name 02/04/21 1337          Transfers    Comment (Transfers)  VC for hand placement  -AA     Row Name 02/04/21 1337          Sit-Stand Transfer    Sit-Stand [place] (Transfers)  contact guard;verbal cues  -AA     Assistive Device (Sit-Stand Transfers)  other (see comments) HHA  -AA     Row Name 02/04/21 1337          Gait/Stairs (Locomotion)    [place] Level (Gait)  minimum assist (75% patient effort);verbal cues  -AA     Assistive Device (Gait)  other (see comments) HHA  -AA     Distance in Feet (Gait)  50  -AA     Deviations/Abnormal Patterns (Gait)  bilateral deviations;beul[place] decreased;steppage  -AA     Bilateral Gait Deviations  forward flexed posture;heel strike decreased  -AA     [place] Level (Stairs)  not tested  -AA     Comment (Gait/Stairs)  Pt ambulated with min A and HHA with mild instability and LOB initially and progressed to improved balance with no LOB.  Recommend use of RW, pt decline to ambulate further for trial today.  Educated daughter on use of RW next visit for possible use at home, agreeable.  No fatigue with gait  -AA       User Key  (r) = Recorded By, (t) = Taken By, (c) = Cosigned By    Initials Name Provider Type    Yue Bowie PT Physical Therapist        Obj/Interventions     Row Name 02/04/21 1337          Range of Motion Comprehensive    General Range of Motion  bilateral lower extremity ROM WNL  -AA     Row Name 02/04/21 1337          Strength Comprehensive (MMT)    General Manual Muscle Testing (MMT) Assessment  lower extremity strength deficits identified  -AA     Comment, General Manual Muscle Testing (MMT) Assessment  BLE  grossly assessed 4-/5  -AA     Row Name 02/04/21 1337          Balance    Balance Assessment  sitting static balance;standing static balance;standing dynamic balance  -AA     Static Sitting Balance  WNL;unsupported;sitting in chair  -AA     Static Standing Balance  WFL;supported;standing  -AA     Dynamic Standing Balance  mild impairment;supported;standing  -AA     Row Name 02/04/21 1337          Sensory Assessment (Somatosensory)    Sensory Assessment (Somatosensory)  LE sensation intact  -AA       User Key  (r) = Recorded By, (t) = Taken By, (c) = Cosigned By    Initials Name Provider Type    Yue Bowie, PT Physical Therapist        Goals/Plan     Row Name 02/04/21 1337          Bed Mobility Goal 1 (PT)    Activity/Assistive Device (Bed Mobility Goal 1, PT)  bed mobility activities, all  -AA     [place] Level/Cues Needed (Bed Mobility Goal 1, PT)  standby assist  -AA     Time Frame (Bed Mobility Goal 1, PT)  10 days  -AA     Progress/Outcomes (Bed Mobility Goal 1, PT)  goal ongoing  -AA     Row Name 02/04/21 1337          Transfer Goal 1 (PT)    Activity/Assistive Device (Transfer Goal 1, PT)  sit-to-stand/stand-to-sit;bed-to-chair/chair-to-bed  -AA     [place] Level/Cues Needed (Transfer Goal 1, PT)  standby assist  -AA     Time Frame (Transfer Goal 1, PT)  10 days  -AA     Progress/Outcome (Transfer Goal 1, PT)  goal ongoing  -     Row Name 02/04/21 1337          Gait Training Goal 1 (PT)    Activity/Assistive Device (Gait Training Goal 1, PT)  gait (walking locomotion);assistive device use  -AA     [place] Level (Gait Training Goal 1, PT)  contact guard assist;verbal cues required  -AA     Distance (Gait Training Goal 1, PT)  100'  -AA     Time Frame (Gait Training Goal 1, PT)  10 days  -AA     Progress/Outcome (Gait Training Goal 1, PT)  goal ongoing  -AA       User Key  (r) = Recorded By, (t) = Taken By, (c) = Cosigned By    Initials Name Provider Type    Yue Bowie, PT Physical  Therapist        Clinical Impression     Row Name 02/04/21 8237          Pain    Additional Documentation  Pain Scale: Numbers Pre/Post-Treatment (Group)  -AA     Row Name 02/04/21 1337          Pain Scale: Numbers Pre/Post-Treatment    Pretreatment Pain Rating  0/10 - no pain  -AA     Posttreatment Pain Rating  0/10 - no pain  -AA     Row Name 02/04/21 1337          Plan of Care Review    Plan of Care Reviewed With  patient;daughter  -AA     Progress  improving  -AA     Outcome Summary  PT eval complete.  Pt presents with reeduced mobility and generalized weakness warranting skilled IPPT services.  Pt CGA for STS with HHA.  Pt ambulated 50' with min A, HHA, and mild instability that improves with distance.  Pt would benefit from trial with RW next visit.  Recommend home with 24/7 care, home health, and RW.  -AA     Row Name 02/04/21 1337          Therapy Assessment/Plan (PT)    Rehab Potential (PT)  good, to achieve stated therapy goals  -AA     Criteria for Skilled Interventions Met (PT)  yes;meets criteria;skilled treatment is necessary  -AA     Predicted Duration of Therapy Intervention (PT)  10 days  -AA     Row Name 02/04/21 1337          Vital Signs    Pre Patient Position  Sitting  -AA     Intra Patient Position  Standing  -AA     Post Patient Position  Sitting  -AA     [facility] Name 02/04/21 1334          Positioning and Restraints    Pre-Treatment Position  sitting in chair/recliner  -AA     Post Treatment Position  chair  -AA     In Chair  notified nsg;reclined;call light within reach;encouraged to call for assist;exit alarm on;with family/caregiver;waffle cushion;legs elevated  -AA       User Key  (r) = Recorded By, (t) = Taken By, (c) = Cosigned By    Initials Name Provider Type    Yue Bowie, PT Physical Therapist        Outcome Measures     Row Name 02/04/21 1769          How much help from another person do you currently need...    Turning from your back to your side while in flat bed without using  bedrails?  3  -AA     Moving from lying on back to sitting on the side of a flat bed without bedrails?  3  -AA     Moving to and from a bed to a chair (including a wheelchair)?  3  -AA     Standing up from a chair using your arms (e.g., wheelchair, bedside chair)?  3  -AA     Climbing 3-5 steps with a railing?  2  -AA     To walk in hospital room?  3  -AA     AM-PAC 6 Clicks Score (PT)  17  -AA     Row Name 02/04/21 [facility]7          Functional Assessment    Outcome Measure Options  AM-PAC 6 Clicks Basic Mobility (PT)  -       User Key  (r) = Recorded By, (t) = Taken By, (c) = Cosigned By    Initials Name Provider Type    AA Yue Hopper PT Physical Therapist        Physical Therapy Education                 Title: PT OT SLP Therapies (In Progress)     Topic: Physical Therapy (In Progress)     Point: Mobility training (Done)     Learning Progress Summary           Patient Acceptance, E,D, VU,NR,DU by YEMI at 2/4/2021 1337   Family Acceptance, E,D, VU,NR,DU by  at 2/4/2021 1337                   Point: Home exercise program (Not Started)     Learner Progress:  Not documented in this visit.          Point: Body mechanics (Done)     Learning Progress Summary           Patient Acceptance, E,D, VU,NR,DU by YEMI at 2/4/2021 1337   Family Acceptance, E,D, VU,NR,DU by AA at 2/4/2021 1337                   Point: Precautions (Done)     Learning Progress Summary           Patient Acceptance, E,D, VU,NR,DU by YEMI at 2/4/2021 1337   Family Acceptance, E,D, VU,NR,DU by  at 2/4/2021 1337                               User Key     Initials Effective Dates Name Provider Type [facility] 04/02/18 -  Yue Hopper PT Physical Therapist PT              PT Recommendation and Plan  Planned Therapy Interventions (PT): balance training, bed mobility training, gait training, home exercise program, transfer training, patient/family education, strengthening  Plan of Care Reviewed With: patient, daughter  Progress: improving  Outcome  Summary: PT eval complete.  Pt presents with reeduced mobility and generalized weakness warranting skilled IPPT services.  Pt CGA for STS with HHA.  Pt ambulated 50' with min A, HHA, and mild instability that improves with distance.  Pt would benefit from trial with RW next visit.  Recommend home with 24/7 care, home health, and RW.     Time Calculation:   PT Charges     Row Name 02/04/21 1337             Time Calculation    Start Time  1337  -AA      PT Received On  02/04/21  -AA      PT Goal Re-Cert Due Date  02/14/21  -AA        User Key  (r) = Recorded By, (t) = Taken By, (c) = Cosigned By    Initials Name Provider Type    AA Yue Hopper, PT Physical Therapist        Therapy Charges for Today     Code Description Service Date Service Provider Modifiers Qty    87291374599 HC PT EVAL LOW COMPLEXITY 4 2/4/2021 Yue Hopper, PT GP 1          PT G-Codes  Outcome Measure Options: AM-PAC 6 Clicks Basic Mobility (PT)  AM-PAC 6 Clicks Score (PT): 17  AM-PAC 6 Clicks Score (OT): 16    April POOL Hopper PT  2/4/2021

## 2021-02-04 NOTE — PROGRESS NOTES
[facility] Medicine Services  PROGRESS NOTE    Patient Name: Negrito Joe  : 1934  MRN: [id number]    Date of Admission: 2021  Primary Care Physician: Dany Astudillo MD    Subjective   Subjective     CC:  AMS    HPI:  Awake and alert and sitting in chair but confused.  Daughter at bedside.  Nurse states that last pm when he woke up that he was fairly clear/oriented x 3, now more confused today.    ROS:  Unable to obtain d/t AMS    Objective   Objective     Vital Signs:   Temp:  [98 °F (36.7 °C)-99.6 °F (37.6 °C)] 99.6 °F (37.6 °C)  Heart Rate:  [65-97] 97  Resp:  [18] 18  BP: ()/(33-58) 129/58        Physical Exam:  Constitutional: awake and alert, sitting up in chair, daughter at bedside  HENT: NCAT, mucous membranes moist  Respiratory: Clear to auscultation bilaterally, respiratory effort normal   Cardiovascular: RRR, no murmurs, rubs, or gallops  Gastrointestinal: Positive bowel sounds, soft, nontender, nondistended  Musculoskeletal: No bilateral ankle edema  Psychiatric: Appropriate affect, cooperative  Neurologic: awake and alert, sitting up in chair, nonsensical speech, moves all extremities  Skin: No rashes      Results Reviewed:  Results from last 7 days   Lab Units 21  0621  0521  18521  1245   WBC 10*3/mm3 9.75 9.16  --  11.63*   HEMOGLOBIN g/dL 7.2* 7.0* 5.5* 6.2*   HEMATOCRIT % 21.6* 21.1* 16.5* 17.7*   PLATELETS 10*3/mm3 447 387  --  413   PROCALCITONIN ng/mL  --   --   --  0.15     Results from last 7 days   Lab Units 21  0621  0547 21  18521  1245   SODIUM mmol/L 139 143  --  135*   POTASSIUM mmol/L 3.5 4.5  --  5.3*   CHLORIDE mmol/L 111* 111*  --  103   CO2 mmol/L 24.0 23.0  --  23.0   BUN mg/dL 25* 28*  --  34*   CREATININE mg/dL 1.43* 1.59*  --  1.74*   GLUCOSE mg/dL 101* 97  --  115*   CALCIUM mg/dL 8.6 8.3*  --  8.8   ALT (SGPT) U/L  --  11  --  14   AST (SGOT) U/L  --  19  --        TROPONIN T ng/mL  --   --   --  <0.010   PROBNP pg/mL  --   --  702.5 706.6     Estimated Creatinine Clearance: 35.8 mL/min (A) (by C-G formula based on SCr of 1.43 mg/dL (H)).    Microbiology Results Abnormal     Procedure Component Value - Date/Time    Urine Culture - Urine, Urine, Clean Catch [[id number]]  (Normal) Collected: 02/02/21 1301    Lab Status: Final result Specimen: Urine, Clean Catch Updated: 02/03/21 1534     Urine Culture No growth    Blood Culture - Blood, Arm, Right [[id number]] Collected: 02/02/21 1309    Lab Status: Preliminary result Specimen: Blood from Arm, Right Updated: 02/03/21 1330     Blood Culture No growth at 24 hours    Blood Culture - Blood, Arm, Left [[id number]] Collected: 02/02/21 1300    Lab Status: Preliminary result Specimen: Blood from Arm, Left Updated: 02/03/21 1330     Blood Culture No growth at 24 hours    COVID PRE-OP / PRE-PROCEDURE SCREENING ORDER (NO ISOLATION) - Swab, Nasopharynx [[id number]]  (Normal) Collected: 02/02/21 1413    Lab Status: Final result Specimen: Swab from Nasopharynx Updated: 02/02/21 1442    Narrative:      The following orders were created for panel order COVID PRE-OP / PRE-PROCEDURE SCREENING ORDER (NO ISOLATION) - Swab, Nasopharynx.  Procedure                               Abnormality         Status                     ---------                               -----------         ------                     COVID-19, ABBOTT IN-HOUS...[[id number]]  Normal              Final result                 Please view results for these tests on the individual orders.    COVID-19, ABBOTT IN-HOUSE,NASAL Swab (NO TRANSPORT MEDIA) 2 HR TAT - Swab, Nasopharynx [[id number]]  (Normal) Collected: 02/02/21 1413    Lab Status: Final result Specimen: Swab from Nasopharynx Updated: 02/02/21 1442     COVID19 Presumptive Negative    Narrative:      Fact sheet for providers: https://www.fda.gov/media/037227/download     Fact sheet for patients:  https://www.fda.gov/media/557288/download    Test performed by PCR.  If inconsistent with clinical signs and symptoms patient should be tested with different authorized molecular test.          Imaging Results (Last 24 Hours)     Procedure Component Value Units Date/Time    CT Head Without Contrast [[id number]] Collected: 02/02/21 1401     Updated: 02/03/21 2126    Narrative:      EXAMINATION: CT HEAD WO CONTRAST- 02/02/2021     INDICATION: AMS     TECHNIQUE: 5 mm unenhanced images through the brain     The radiation dose reduction device was turned on for each scan per the  ALARA (As Low as Reasonably Achievable) protocol.     COMPARISON: NONE     FINDINGS: The calvarium appears intact. There is evidence of previous  paranasal sinus surgery, and opacification of much of the remaining  paranasal sinuses. There is also trace left mastoid disease. Soft tissue  window images show expected degree of generalized cerebral atrophy for  age. There is no evidence of mass or mass effect, edema, infarct,  hemorrhage, hydrocephalus, or abnormal extra-axial collection. No gross  abnormalities are seen of the orbits.       Impression:      Age-appropriate generalized cerebral atrophy. No evidence of  acute intracranial disease. Incidentally noted paranasal sinus disease.     D:  02/02/2021  E:  02/02/2021        This report was finalized on 2/3/2021 9:23 PM by Dr. Christopher Head MD.       MRI Brain Without Contrast [[id number]] Collected: 02/03/21 0932     Updated: 02/03/21 0936    Narrative:      EXAMINATION: MRI BRAIN WO CONTRAST-      INDICATION: Mental status change, unknown cause; R41.82-Altered mental  status, unspecified; D64.9-Anemia, unspecified; E86.0-Dehydration     TECHNIQUE: Diffusion scans only were obtained due to patient  intolerance.     COMPARISON: CT head 1 day prior     FINDINGS: No acute infarct is noted on diffusion weighted sequences.  Redemonstrated diffuse volume loss. No obvious global mass effect,  midline  shift or increased ventricular caliber.       Impression:      Limited MRI with diffusion sequences only obtained.No acute  infarct is noted. Redemonstrated diffuse volume loss. No obvious global  mass effect, midline shift or increased ventricular caliber.        This report was finalized on 2/3/2021 9:33 AM by Magdaleno Tenorio.                  I have reviewed the medications:  Scheduled Meds:azithromycin, 500 mg, Intravenous, Q24H  cefTRIAXone, 1 g, Intravenous, Q24H  sodium chloride, 10 mL, Intravenous, Q12H      Continuous Infusions:sodium chloride, 75 mL/hr, Last Rate: 75 mL/hr (02/03/21 1306)      PRN Meds:.•  acetaminophen **OR** acetaminophen **OR** acetaminophen  •  sodium chloride    Assessment/Plan   Assessment & Plan     Active Hospital Problems    Diagnosis  POA   • **AMS (altered mental status) [R41.82]  Unknown   • Hemolytic anemia (CMS/HCC) [D58.9]  Yes   • CELY (acute kidney injury) (CMS/HCC) [N17.9]  Yes   • Hypotension [I95.9]  Unknown   • Monoclonal gammopathy [D47.2]  Yes   • Hyperlipidemia [E78.5]  Yes   • 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        Brief Hospital Course to date:  Negrito Joe is a 86 y.o. 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  The patient was brought to the ER by his son in law who provided the history.  The patient has become more confused over the last 2 days.  He normally lives alone with help during the day.  On admit patient was without complaints but confused.     In the ER, creatinine 1.74 (baseline 0.78).  H/H 6.2/17.7 (2 months ago was 11/36).  CT head is nonacute.  COVID negative.  Patient received azithromycin and Rocephin in the ER due to CXR with mild left basilar atelectasis versus early changes of PNA.  No cough or fever though.  UA with small leukocytes and moderate WBC but no  bacteria on micro.      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  Pt unaware that he is confused. Son in law reports this is new x 2 days, reports 1 similar episode and was his AI hemolytic anemia then  --CT head with no acute finding  --Likely related to a mild UTI versus mild early pneumonia or bronchitis.    --MRI was limited as only diffusion sequences obtained,  No acute infarct        Anemia  Hx of cold autoimmune hemolytic anemia  Monoclonal gammopathy  --followed by Dr POOL Hurd patient, consulted.    --hgb as low as 5.5 in ER. Stool for guiac negative, specimen was double checked and considered accurate, s/p 2units prbc on 2021   -Per Dr Hurd should assure his h/h was kept warm and immediately run for accuracy.  IF accurate value then only tx for now is to transfuse.  However, PRBCs MUST BE TRANSFUSED WITH A BLOOD WARMER.  --IVFs as needed okay but should either warm IVF or warm pt (ie warming blankets etc)  --per update from patient's PCP/Dr. Astudillo his daughter reports patient has been standing outside without coat talking with neighbor and Dr. Hurd agrees could explain hemolysis     CELY  -mild improvement with IVF    Hypotension  dehydration  -BP better today, cont NS at  75/hr, must keep warm       Mild leukocytosis  Atelectasis vs early mild PNA changes   Possible UTI  --Cx's no growth currently  --CXR with Lt basilar atelectasis vs changes of early PNA  --V rocephin/azith Day 3.  Await urine culture and monitor respiratory status.       BLE chronic edema  --family reports x 10 + years  --no known hx of CHF  --add bnp to ER labs   --may need diuresis esther with getting IVFs and blood      HLD  Asthma  --Home meds, oxygen as needed    Bereavement  -passing of his wife 2020, her  is next  at [facility]      DVT prophylaxis: sequentials, No a/c due to anemia        Disposition: I expect the  patient to be discharged TBD    CODE STATUS:   Code Status and Medical Interventions:   Ordered at: 02/02/21 1758     Level Of Support Discussed With:    Patient    Next of Kin (If No Surrogate)     Code Status:    CPR     Medical Interventions (Level of Support Prior to Arrest):    Full       Emanuel Carreno MD  02/04/21

## 2021-02-05 NOTE — DISCHARGE PLACEMENT REQUEST
"Reji Rainey (86 y.o. Male)   Referred 2/5/2021 by Dr. Emanuel Carreno  PCP Dr. Dany Astudillo  Dx-to be determined   tested for covid 19 on 2/2/2021 results presumptive negative    Date of Birth Social Security Number Address Home Phone MRN    1934  [address] [place] [address] [phone] [id number]    [employer] Status          Unknown        Admission Date Admission Type Admitting Provider Attending Provider Department, Room/Bed    2/2/21 Emergency Emanuel Carreno MD Lyons, Andrea L, MD [facility] 3E, S341/1    Discharge Date Discharge Disposition Discharge Destination                       Attending Provider: Emanuel Carreno MD    Allergies: Bactrim [Sulfamethoxazole-trimethoprim], Tetracycline    Isolation: None   Infection: None   Code Status: CPR    Ht: 177.8 cm (70\")   Wt: 68.3 kg (150 lb 8 oz)    Admission Cmt: None   Principal Problem: AMS (altered mental status) [R41.82]                 Active Insurance as of 2/2/2021     Primary Coverage     Payor Plan Insurance Group Employer/Plan Group    MEDICARE MEDICARE A & B      Payor Plan Address Payor Plan Phone Number Payor Plan Fax Number Effective Dates    [address] [phone]  5/1/1999 - None Entered    [facility] [id number]       Subscriber Name Subscriber Birth Date Member ID       REJI RAINEY 1934 [id number]           Secondary Coverage     Payor Plan Insurance Group Employer/Plan Group     FOR LIFE  FOR LIFE  SUP       Payor Plan Address Payor Plan Phone Number Payor Plan Fax Number Effective Dates    [address] [phone]  3/27/2017 - None Entered    [facility] [phone]       Subscriber Name Subscriber Birth Date Member ID       REJI RAINEY 1934 [id number]                 Emergency Contacts      (Rel.) Home Phone Work Phone Mobile Phone    Leonila Parker (Daughter) [phone] -- --    EDGARD MURRAY (Relative) [phone] -- " "[phone]            Emergency Contact Information     Name Relation Home Work Mobile    Leonila Parker Daughter [phone]      EDGARD MURRAY Relative [phone]          Insurance Information                MEDICARE/MEDICARE A & B Phone: [phone]    Subscriber: Negrito Joe Subscriber#: [id number]    Group#:  Precert#:          FOR LIFE/ FOR LIFE  SUP  Phone: [phone]    Subscriber: Negrito Joe Subscriber#: [id number]    Group#:  Precert#:              History & Physical      Deborah Church MD at [address] [place] [address] Medicine Services  HISTORY AND PHYSICAL    Patient Name: Negrito Joe  : 1934  MRN: [id number]  Primary Care Physician: Dany Astudillo MD  Date of admission: 2021      Subjective   Subjective     Chief Complaint:  AMS    HPI:  Negrito Joe is a 86 y.o. 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 Pt presents to the ED w son-in-law w cc of altered mental status. The son-in-law notes that over the last 2 days the patient has been gradually more confused.  He has been talking about flying planes again (patient was a  in [place]). His wife passed away in 2020. He lives alone and is normally oriented and cares for himself.  Has a caregiver several hrs a day for assist. Patient denies any complaints right now.  He is uncertain where currently is but does not believe that he is confused.  He denies pain, cough, fever, chills, n/v or soa.  States he feels \"okay\" now.  No issues per family except for confusion since yesterday.     On ER eval patient was noted to have CELY with a creatinine of 1.74.  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  Hemoglobin 6.2 and was recently 11.36 in October.  CT head was negative for acute finding.  Negative Covid.  UA with " small leukocytes and moderate WBCs but no nitrites or bacteria.  Chest x-ray with mild left basilar atelectasis versus early changes of pneumonia.  Patient received azithromycin and Rocephin in ER.  IV PPI and IV fluid bolus.  Will admit to hospital medicine service for status, CELY, and anemia.  Possible early pneumonia versus UTI.    COVID Details: [x] No Symptoms (none reported by patient or his son-in-law).  Symptoms: [] Fever []  Cough [] Shortness of breath [] Change in taste or smell  Risks:  [] Direct Exposure [] High risk facility   Date of Onset:  ____  Date of first positive COVID test:     Review of Systems   Constitutional: Positive for activity change and fatigue. Negative for fever.   HENT: Negative.    Respiratory: Negative for cough, shortness of breath and wheezing.    Cardiovascular: Positive for leg swelling.        Patient's son in law reports chronic lower extremity edema for about the last 10 years.  No known CHF.   Gastrointestinal: Negative.    Genitourinary: Negative.    Musculoskeletal: Negative.    Skin: Positive for pallor.   Neurological: Negative.    Hematological: Negative.    Psychiatric/Behavioral: Positive for confusion and decreased concentration.        All other systems reviewed and are negative.     Personal History     Past Medical History:   Diagnosis Date   • Asthma        Past Surgical History:   Procedure Laterality Date   • COLONOSCOPY     • HERNIA REPAIR      1982 & 2014   • SINUS SURGERY      Multiple       Family History: family history includes Aneurysm in his father; Cancer in an other family member; Heart attack in his father. Otherwise pertinent FHx was reviewed and unremarkable.     Social History:  reports that he has never smoked. He has never used smokeless tobacco. He reports current alcohol use. He reports that he does not use drugs.  Social History     Social History Narrative   • Not on file       Medications:  Available home medication information  reviewed.  (Not in a hospital admission)      Allergies   Allergen Reactions   • Bactrim [Sulfamethoxazole-Trimethoprim]    • Tetracycline Rash       Objective   Objective     Vital Signs:   Temp:  [96.6 °F (35.9 °C)] 96.6 °F (35.9 °C)  Heart Rate:  [75-96] 92  Resp:  [17] 17  BP: ()/(41-55) 98/54       Physical Exam   Constitutional: Awake, alert. Resting back in bed with son-in-law at bed in ER.  NAD  Eyes: PERRLA, sclerae anicteric, no conjunctival injection  HENT: NCAT, mucous membranes moist  Neck: Supple, no thyromegaly, no lymphadenopathy, trachea midline  Respiratory: Clear to auscultation bilaterally to upper lobes.  Slightly decreased to lower lobes and rare exp wheeze to LLL, nonlabored respirations on RA with sats wnl  Cardiovascular: RRR, no murmurs, rubs, or gallops, palpable pedal pulses bilaterally  Gastrointestinal: Positive bowel sounds, soft, nontender, nondistended  Musculoskeletal: BLE 1-2+ pitting edema from shin to toes, no clubbing or cyanosis to extremities.  No obvious areas of erythema or lesion  Psychiatric: Appropriate affect, cooperative and calm  Neurologic: Oriented x to person and date. Confused to location, time and recent events.  Strength symmetric in all extremities, no focal deficits appreciated. speech clear but confused.   Skin: No rashes      Result Review:  I have personally reviewed the results from the time of this admission to 02/02/21 4:38 PM EST and agree with these findings:  [x]  Laboratory  []  Microbiology  [x]  Radiology  [x]  EKG/Telemetry   []  Cardiology/Vascular   []  Pathology  [x]  Old records  [x]  Other: stool guiac negative     Most notable findings include:       LAB RESULTS:      Lab 02/02/21  1245   WBC 11.63*   HEMOGLOBIN 6.2*   HEMATOCRIT 17.7*   PLATELETS 413   NEUTROS ABS 8.57*   IMMATURE GRANS (ABS) 0.08*   LYMPHS ABS 1.91   MONOS ABS 0.96*   EOS ABS 0.07   .6*   PROCALCITONIN 0.15   LACTATE 0.9         Lab 02/02/21  1245   SODIUM 135*    POTASSIUM 5.3*   CHLORIDE 103   CO2 23.0   ANION GAP 9.0   BUN 34*   CREATININE 1.74*   GLUCOSE 115*   CALCIUM 8.8   MAGNESIUM 2.1         Lab 02/02/21  1245   TOTAL PROTEIN 6.8   ALBUMIN 3.40*   GLOBULIN 3.4   ALT (SGPT) 14   AST (SGOT) 23   BILIRUBIN 2.0*   ALK PHOS 84         Lab 02/02/21  1245   PROBNP 706.6   TROPONIN T <0.010                 Brief Urine Lab Results  (Last result in the past 365 days)      Color   Clarity   Blood   Leuk Est   Nitrite   Protein   CREAT   Urine HCG        02/02/21 1301 Dark Yellow Cloudy Moderate (2+) Small (1+) Negative 30 mg/dL (1+)             Microbiology Results (last 10 days)     Procedure Component Value - Date/Time    COVID PRE-OP / PRE-PROCEDURE SCREENING ORDER (NO ISOLATION) - Swab, Nasopharynx [[id number]]  (Normal) Collected: 02/02/21 1413    Lab Status: Final result Specimen: Swab from Nasopharynx Updated: 02/02/21 1442    Narrative:      The following orders were created for panel order COVID PRE-OP / PRE-PROCEDURE SCREENING ORDER (NO ISOLATION) - Swab, Nasopharynx.  Procedure                               Abnormality         Status                     ---------                               -----------         ------                     COVID-19, ABBOTT IN-HOUS...[[id number]]  Normal              Final result                 Please view results for these tests on the individual orders.    COVID-19, ABBOTT IN-HOUSE,NASAL Swab (NO TRANSPORT MEDIA) 2 HR TAT - Swab, Nasopharynx [[id number]]  (Normal) Collected: 02/02/21 1413    Lab Status: Final result Specimen: Swab from Nasopharynx Updated: 02/02/21 1442     COVID19 Presumptive Negative    Narrative:      Fact sheet for providers: https://www.fda.gov/media/459260/download     Fact sheet for patients: https://www.fda.gov/media/357742/download    Test performed by PCR.  If inconsistent with clinical signs and symptoms patient should be tested with different authorized molecular test.          Ct Head Without  Contrast    Result Date: 2/2/2021  EXAMINATION: CT HEAD WO CONTRAST- 02/02/2021  INDICATION: AMS  TECHNIQUE: 5 mm unenhanced images through the brain  The radiation dose reduction device was turned on for each scan per the ALARA (As Low as Reasonably Achievable) protocol.  COMPARISON: NONE  FINDINGS: The calvarium appears intact. There is evidence of previous paranasal sinus surgery, and opacification of much of the remaining paranasal sinuses. There is also trace left mastoid disease. Soft tissue window images show expected degree of generalized cerebral atrophy for age. There is no evidence of mass or mass effect, edema, infarct, hemorrhage, hydrocephalus, or abnormal extra-axial collection. No gross abnormalities are seen in the orbits.      Impression: Age-appropriate generalized cerebral atrophy. No evidence of acute intracranial disease. Incidentally noted paranasal sinus disease.  D:  02/02/2021 E:  02/02/2021       Xr Chest 1 View    Result Date: 2/2/2021  EXAMINATION: XR CHEST 1 VW-02/02/2021:  INDICATION: Weak/Dizzy/AMS, triage protocol.  COMPARISON: NONE.  FINDINGS: The heart shadow is in the upper range of normal size. The vasculature is cephalized. There is a mild diffuse interstitial disease pattern, resembling early changes of interstitial edema. There is probably a component of chronic pulmonary change as well. In addition, there is focal, rather mild patchy changes of the left lung base that could represent atelectasis or perhaps mild changes of pneumonia. The right lung base appears clear allowing for the overlying shadows of the calcified costal cartilages. The bony structures appear to be intact.      Impression: 1. Pulmonary venous hypertension, and possibly minimal/early interstitial edema. 2. Mild left basilar atelectasis versus early changes of pneumonia.   D:  02/02/2021 E:  02/02/2021              Assessment/Plan   Assessment & Plan     Active Hospital Problems    Diagnosis POA   • **AMS  (altered mental status) [R41.82] Unknown   • Hemolytic anemia (CMS/HCC) [D58.9] Yes     Priority: High   • CELY (acute kidney injury) (CMS/HCC) [N17.9] Yes     Priority: High   • Monoclonal gammopathy [D47.2] Yes     Priority: High   • Cold autoimmune hemolytic anemia (CMS/HCC) [D59.12] Yes     Priority: High   • Hypotension [I95.9] Unknown   • Hyperlipidemia [E78.5] Yes     Negrito Joe is a 86 y.o. 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 Pt presents to the ED w son-in-law w cc of altered mental status. The son-in-law notes that over the last 2 days the patient has been gradually more confused.  He has been talking about flying planes again (patient was a  in [place]). UA with small leukocytes and moderate WBCs but no nitrites or bacteria.  Chest x-ray with mild left basilar atelectasis versus early changes of pneumonia. Stool guiac was neg and COVID screen neg.  Patient received azithromycin and Rocephin in ER.  IV PPI and IV fluid bolus.  Will admit to hospital medicine service for AMS, CELY, and anemia.  Possible early pneumonia versus UTI.    Assessment/plan:    AMS x 2 days  --per son.  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  Pt unaware that he is confused. Son reports this is new x 2 days.   --CT head with no acute finding  --Likely related to a mild UTI versus mild early pneumonia or bronchitis.    --However will check stat MRI of the brain without to rule out CVA.  Patient last known in his normal state of health 2 days ago.  -further neuro w/u and consult if indicated.  This could all possibly be due to underlying infection.      Anemia  Hx of cold autoimmune hemolytic anemia  Monoclonal gammopathy  --followed by Dr POOL Hurd patient.  No need for consult at this time.  He is available if needed.  --hgb 6.2 in ER. Stool for guiac negative   --I  called and spoke with Dr Hurd re pt. Reports to assure his h/h was kept warm and immediately run for accuracy.  IF accurate value then only tx for now is to transfuse.  However, PRBCs MUST BE TRANSFUSED WITH A BLOOD WARMER.  --IVFs as needed okay but should either warm IVF or warm pt (ie warming blankets etc)  --T&C and transfuse 2 units when ready.  --I spoke with Maverick in blood bank and tech in hematology.  Specimen was double ckd and consider accurate.    --awaiting blood ready to transfuse.    --h/h at 1900 (warmed due to autoimmune cold agglutinin hemolytic anemia--called hematology and updated  and comment in actual order for phlebotomy to see)  --A.m. labs    CELY  Hypotension  dehydration  --s/p 1 L IVF in ER  --monitor labs, tele  --hold any antihypertensives if any on home meds list    Mild leukocytosis  Atelectasis vs early mild PNA changes   Possible UTI  --UA with WBC but no nitrates/bacteria  --Cx pending  --CXR with Lt basilar atelectasis vs changes of early PNA  --started on IV rocephin/azith in ER, continue for now.  Await urine culture and monitor respiratory status.  --trend labs and cxs    BLE chronic edema  --family reports x 10 + years  --no known hx of CHF  --add bnp to ER labs   --may need diuresis esther with getting IVFs and blood     HLD  Asthma  --Home meds, oxygen as needed      DVT prophylaxis: sequentials, No a/c due to anemia      CODE STATUS:    Code Status and Medical Interventions:   Ordered at: 02/02/21 3369     Level Of Support Discussed With:    Patient    Next of Kin (If No Surrogate)     Code Status:    CPR     Medical Interventions (Level of Support Prior to Arrest):    Full     Comments:    per pt and son-in-law       Admission Status:  I believe this patient meets inpatient criteria due to his autoimmune cold autoimmune hemolytic anemia requiring transfusion and also with AMS requiring further workup.    Electronically signed by YURIDIA Munoz,  02/02/21, 5:46 PM EST.      Attending   Admission Attestation       I have seen and examined the patient, performing an independent face-to-face diagnostic evaluation with plan of care reviewed and developed with the advanced practice clinician (APC).      Brief Summary Statement:   Negrito Joe is a 86 y.o. 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  The patient was brought to the ER by his son in law who provided the history.  The patient has become more confused over the last 2 days.  He normally lives alone with help during the day.  The patient states he feels fine but is clearly confused.    In the ER, creatinine 1.74 (baseline 0.78).  H/H 6.2/17.7 (2 months ago was 11/36).  CT head is nonacute.  COVID negative.  Patient received azithromycin and Rocephin in the ER due to CXR with mild left basilar atelectasis versus early changes of PNA.  No cough or fever though.  UA with small leukocytes and moderate WBC but no bacteria on micro.      Remainder of detailed HPI is as noted by APC and has been reviewed and/or edited by me for completeness.    Attending Physical Exam:  Constitutional: Awake, alert, conversant and pleasant but obviously confused.  Eyes: PERRLA, sclerae anicteric, no conjunctival injection  HENT: NCAT, mucous membranes moist  Neck: Supple, no thyromegaly, no lymphadenopathy, trachea midline  Respiratory: Clear to auscultation bilaterally, nonlabored respirations   Cardiovascular: RRR, no murmurs, rubs, or gallops, palpable pedal pulses bilaterally  Gastrointestinal: Positive bowel sounds, soft, nontender, nondistended  Musculoskeletal: 1+ bilateral ankle edema, no clubbing or cyanosis to extremities  Psychiatric: Appropriate affect, cooperative  Neurologic: Oriented x 1 (person only), strength symmetric in all extremities, Cranial Nerves grossly intact to confrontation, speech clear  Skin:  Jaundiced.      Brief Assessment/Plan :  See detailed assessment and plan developed with APC which I have reviewed and/or edited for completeness.    Deborah Church MD  02/02/21                    Electronically signed by Deborah Church MD at 02/02/21 2346          Emergency Department Notes      Jacob Tellez MD at 02/02/21 1435      Procedure Orders    1. Critical Care [[id number]] ordered by Jacob Tellez MD                EMERGENCY DEPARTMENT ENCOUNTER    Room Number:  05/05  Date of encounter:  2/2/2021  PCP: Dany Astudillo MD  Historian: Son-in-law and patient      HPI:  Chief Complaint: Altered mental status        Context: Negrito Joe is a 86 y.o. male who presents to the ED c/o altered mental status noticed mostly by his son-in-law.  The son-in-law notes that over the last 2 days the patient has been gradually more confused.  He has been talking about flying planes again (patient was a  in [place]).  Patient denies any complaints right now.  He is uncertain where currently is but does not believe that he is confused.  He denies pain, cough, fever, chills.      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   • Asthma 04/26/2019   • Hyperlipidemia 04/26/2019   • Monoclonal gammopathy 10/22/2020     Resolved Ambulatory Problems     Diagnosis Date Noted   • No Resolved Ambulatory Problems     No Additional Past Medical History         PAST SURGICAL HISTORY  Past Surgical History:   Procedure Laterality Date   • COLONOSCOPY     • HERNIA REPAIR      1982 & 2014   • SINUS SURGERY      Multiple         FAMILY HISTORY  Family History   Problem Relation Age of Onset   • Aneurysm Father    • Heart attack Father    • Cancer Other          SOCIAL HISTORY  Social History     Socioeconomic History   • Marital status:      Spouse name: Not on file   • Number of  children: Not on file   • Years of education: Not on file   • Highest education level: Not on file   Tobacco Use   • Smoking status: Never Smoker   • Smokeless tobacco: Never Used   Substance and Sexual Activity   • Alcohol use: Yes     Comment: occ- 4x/monthly   • Drug use: No         ALLERGIES  Bactrim [sulfamethoxazole-trimethoprim] and Tetracycline        REVIEW OF SYSTEMS  Review of Systems     All systems reviewed and negative except for those discussed in HPI.       PHYSICAL EXAM    I have reviewed the triage vital signs and nursing notes.    ED Triage Vitals [02/02/21 1241]   Temp Heart Rate Resp BP SpO2   96.6 °F (35.9 °C) 92 17 106/41 99 %      Temp src Heart Rate Source Patient Position BP Location FiO2 (%)   -- -- -- -- --       Physical Exam  GENERAL:   Appears slow to answer questions but nontoxic.  HENT: Nares patent.  Dry mucous membranes no murmurs gallops rubs  EYES: No scleral icterus.  CV: Regular rhythm, regular rate.  RESPIRATORY: Normal effort.  No audible wheezes, rales or rhonchi.  Clear to auscultation  ABDOMEN: Soft, nontender.  No masses  MUSCULOSKELETAL: No deformities.   NEURO: Awake, moves all extremities equally, follows commands.  Oriented to person and exact date.  Confused about location and recent events.  SKIN: Warm, dry, no rash visualized.        LAB RESULTS  Recent Results (from the past 24 hour(s))   Comprehensive Metabolic Panel    Collection Time: 02/02/21 12:45 PM    Specimen: Blood   Result Value Ref Range    Glucose 115 (H) 65 - 99 mg/dL    BUN 34 (H) 8 - 23 mg/dL    Creatinine 1.74 (H) 0.76 - 1.27 mg/dL    Sodium 135 (L) 136 - 145 mmol/L    Potassium 5.3 (H) 3.5 - 5.2 mmol/L    Chloride 103 98 - 107 mmol/L    CO2 23.0 22.0 - 29.0 mmol/L    Calcium 8.8 8.6 - 10.5 mg/dL    Total Protein 6.8 6.0 - 8.5 g/dL    Albumin 3.40 (L) 3.50 - 5.20 g/dL    ALT (SGPT) 14 1 - 41 U/L    AST (SGOT) 23 1 - 40 U/L    Alkaline Phosphatase 84 39 - 117 U/L    Total Bilirubin 2.0 (H) 0.0 - 1.2  mg/dL    eGFR Non African Amer 37 (L) >60 mL/min/1.73    Globulin 3.4 gm/dL    A/G Ratio 1.0 g/dL    BUN/Creatinine Ratio 19.5 7.0 - 25.0    Anion Gap 9.0 5.0 - 15.0 mmol/L   Troponin    Collection Time: 02/02/21 12:45 PM    Specimen: Blood   Result Value Ref Range    Troponin T <0.010 0.000 - 0.030 ng/mL   Magnesium    Collection Time: 02/02/21 12:45 PM    Specimen: Blood   Result Value Ref Range    Magnesium 2.1 1.6 - 2.4 mg/dL   Light Blue Top    Collection Time: 02/02/21 12:45 PM   Result Value Ref Range    Extra Tube hold for add-on    Green Top (Gel)    Collection Time: 02/02/21 12:45 PM   Result Value Ref Range    Extra Tube Hold for add-ons.    Lavender Top    Collection Time: 02/02/21 12:45 PM   Result Value Ref Range    Extra Tube hold for add-on    Gold Top - SST    Collection Time: 02/02/21 12:45 PM   Result Value Ref Range    Extra Tube Hold for add-ons.    Gray Top - Ice    Collection Time: 02/02/21 12:45 PM   Result Value Ref Range    Extra Tube Hold for add-ons.    CBC Auto Differential    Collection Time: 02/02/21 12:45 PM    Specimen: Blood   Result Value Ref Range    WBC 11.63 (H) 3.40 - 10.80 10*3/mm3    RBC 1.63 (L) 4.14 - 5.80 10*6/mm3    Hemoglobin 6.2 (C) 13.0 - 17.7 g/dL    Hematocrit 17.7 (C) 37.5 - 51.0 %    .6 (H) 79.0 - 97.0 fL    MCH 38.0 (H) 26.6 - 33.0 pg    MCHC 35.0 31.5 - 35.7 g/dL    RDW 15.3 12.3 - 15.4 %    RDW-SD 55.1 (H) 37.0 - 54.0 fl    MPV 9.7 6.0 - 12.0 fL    Platelets 413 140 - 450 10*3/mm3    Neutrophil % 73.7 42.7 - 76.0 %    Lymphocyte % 16.4 (L) 19.6 - 45.3 %    Monocyte % 8.3 5.0 - 12.0 %    Eosinophil % 0.6 0.3 - 6.2 %    Basophil % 0.3 0.0 - 1.5 %    Immature Grans % 0.7 (H) 0.0 - 0.5 %    Neutrophils, Absolute 8.57 (H) 1.70 - 7.00 10*3/mm3    Lymphocytes, Absolute 1.91 0.70 - 3.10 10*3/mm3    Monocytes, Absolute 0.96 (H) 0.10 - 0.90 10*3/mm3    Eosinophils, Absolute 0.07 0.00 - 0.40 10*3/mm3    Basophils, Absolute 0.04 0.00 - 0.20 10*3/mm3    Immature  Grans, Absolute 0.08 (H) 0.00 - 0.05 10*3/mm3    nRBC 0.0 0.0 - 0.2 /100 WBC   BNP    Collection Time: 02/02/21 12:45 PM    Specimen: Blood   Result Value Ref Range    proBNP 706.6 0.0-1,800.0 pg/mL   Lactic Acid, Plasma    Collection Time: 02/02/21 12:45 PM    Specimen: Blood   Result Value Ref Range    Lactate 0.9 0.5 - 2.0 mmol/L   Procalcitonin    Collection Time: 02/02/21 12:45 PM    Specimen: Blood   Result Value Ref Range    Procalcitonin 0.15 0.00 - 0.25 ng/mL   ECG 12 Lead    Collection Time: 02/02/21 12:50 PM   Result Value Ref Range    QT Interval 356 ms    QTC Interval 415 ms   Urinalysis With Microscopic If Indicated (No Culture) - Urine, Clean Catch    Collection Time: 02/02/21  1:01 PM    Specimen: Urine, Clean Catch   Result Value Ref Range    Color, UA Dark Yellow (A) Yellow, Straw    Appearance, UA Cloudy (A) Clear    pH, UA <=5.0 5.0 - 8.0    Specific Gravity, UA 1.020 1.001 - 1.030    Glucose, UA Negative Negative    Ketones, UA Trace (A) Negative    Bilirubin, UA Negative Negative    Blood, UA Moderate (2+) (A) Negative    Protein, UA 30 mg/dL (1+) (A) Negative    Leuk Esterase, UA Small (1+) (A) Negative    Nitrite, UA Negative Negative    Urobilinogen, UA 1.0 E.U./dL 0.2 - 1.0 E.U./dL   Urinalysis, Microscopic Only - Urine, Clean Catch    Collection Time: 02/02/21  1:01 PM    Specimen: Urine, Clean Catch   Result Value Ref Range    RBC, UA 31-50 (A) None Seen, 0-2 /HPF    WBC, UA 6-12 (A) None Seen, 0-2 /HPF    Bacteria, UA None Seen None Seen, Trace /HPF    Squamous Epithelial Cells, UA 0-2 None Seen, 0-2 /HPF    Hyaline Casts, UA 0-6 0 - 6 /LPF    Methodology Automated Microscopy    COVID-19, [facility]OTT IN-HOUSE,NASAL Swab (NO TRANSPORT MEDIA) 2 HR TAT - Swab, Nasopharynx    Collection Time: 02/02/21  2:13 PM    Specimen: Nasopharynx; Swab   Result Value Ref Range    COVID19 Presumptive Negative Presumptive Negative - Ref. Range   POCT Occult Blood, stool    Collection Time: 02/02/21  3:23 PM     Specimen: Per Rectum; Stool   Result Value Ref Range    Fecal Occult Blood Negative Negative    Lot Number [id number]     Expiration Date 9/2,023     DEVELOPER LOT NUMBER [id number]     DEVELOPER EXPIRATION DATE 10/2,022     Positive Control Positive Positive    Negative Control Negative Negative       If labs were ordered, I independently reviewed the results.        RADIOLOGY  Ct Head Without Contrast    Result Date: 2/2/2021  EXAMINATION: CT HEAD WO CONTRAST- 02/02/2021  INDICATION: AMS  TECHNIQUE: 5 mm unenhanced images through the brain  The radiation dose reduction device was turned on for each scan per the ALARA (As Low as Reasonably Achievable) protocol.  COMPARISON: NONE  FINDINGS: The calvarium appears intact. There is evidence of previous paranasal sinus surgery, and opacification of much of the remaining paranasal sinuses. There is also trace left mastoid disease. Soft tissue window images show expected degree of generalized cerebral atrophy for age. There is no evidence of mass or mass effect, edema, infarct, hemorrhage, hydrocephalus, or abnormal extra-axial collection. No gross abnormalities are seen in the orbits.      Age-appropriate generalized cerebral atrophy. No evidence of acute intracranial disease. Incidentally noted paranasal sinus disease.  D:  02/02/2021 E:  02/02/2021       Xr Chest 1 View    Result Date: 2/2/2021  EXAMINATION: XR CHEST 1 VW-02/02/2021:  INDICATION: Weak/Dizzy/AMS, triage protocol.  COMPARISON: NONE.  FINDINGS: The heart shadow is in the upper range of normal size. The vasculature is cephalized. There is a mild diffuse interstitial disease pattern, resembling early changes of interstitial edema. There is probably a component of chronic pulmonary change as well. In addition, there is focal, rather mild patchy changes of the left lung base that could represent atelectasis or perhaps mild changes of pneumonia. The right lung base appears clear allowing for the overlying shadows of  the calcified costal cartilages. The bony structures appear to be intact.      1. Pulmonary venous hypertension, and possibly minimal/early interstitial edema. 2. Mild left basilar atelectasis versus early changes of pneumonia.   D:  02/02/2021 E:  02/02/2021         I ordered and reviewed the above noted radiographic studies.    I viewed images of head CT which showed no evidence of acute bleed or acute ischemia per my independent interpretation.  See radiologist's dictation for official interpretation.        PROCEDURES    Critical Care  Performed by: Keyana Gonzalez MD  Authorized by: Keyana Gonzalez MD     Critical care provider statement:     Critical care time (minutes):  35    Critical care time was exclusive of:  Separately billable procedures and treating other patients    Critical care was necessary to treat or prevent imminent or life-threatening deterioration of the following conditions:  Circulatory failure    Critical care was time spent personally by me on the following activities:  Ordering and performing treatments and interventions, ordering and review of laboratory studies, ordering and review of radiographic studies, pulse oximetry, re-evaluation of patient's condition, review of old charts, obtaining history from patient or surrogate, examination of patient, evaluation of patient's response to treatment, discussions with consultants and development of treatment plan with patient or surrogate        ECG 12 Lead   Final Result   Test Reason : Weak/Dizzy/AMS protocol   Blood Pressure : **/** mmHG   Vent. Rate : 082 BPM     Atrial Rate : 082 BPM      P-R Int : 158 ms          QRS Dur : 110 ms       QT Int : 356 ms       P-R-T Axes : 066 -53 065 degrees      QTc Int : 415 ms      Normal sinus rhythm   Left anterior fascicular block   Abnormal ECG   No previous ECGs available   Confirmed by KEYANA GONZALEZ MD ([id number]) on 2/2/2021 2:38:26 PM      Referred By:  EDMD           Confirmed By:KEYANA GONZALEZ MD           MEDICATIONS GIVEN IN ER    Medications   sodium chloride 0.9 % flush 10 mL (has no administration in time range)   AZITHROMYCIN 500 MG/250 ML 0.9% NS IVPB (vial-mate) (500 mg Intravenous New Bag 2/2/21 1528)   sodium chloride 0.9 % bolus 1,000 mL (0 mL Intravenous Stopped 2/2/21 1505)   cefTRIAXone (ROCEPHIN) 1 g/100 mL 0.9% NS (MBP) (1 g Intravenous New Bag 2/2/21 1459)   pantoprazole (PROTONIX) injection 40 mg (40 mg Intravenous Given 2/2/21 1526)         PROGRESS, DATA ANALYSIS, CONSULTS, AND MEDICAL DECISION MAKING    All labs have been independently reviewed by me.  All radiology studies have been reviewed by me and the radiologist dictating the report.   EKG's have been independently viewed and interpreted by me.            ED Course as of Feb 02 1530   Tue Feb 02, 2021   1425 Prevoid bladder scan showed 90 mL.  Patient able to urinate afterward.  Patient presents with acute kidney injury, appears prerenal.  Hypotensive as well.  Receiving normal saline 1 L bolus.  I have cultured his urine.    [MS]   1504 Patient is still hypotensive.  H&H have returned at 6.2 and 17.7.  I have ordered packed red blood cells, guaiac testing, Protonix in addition to antibiotics for presumed pneumonia.  I have updated his PCP.  We will admit.    [MS]   1511 I spoke with Dr. Astudillo, PCP.  Updated him on findings.  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    [MS]   1528 I paged the hospitalist for admission.    [MS]      ED Course User Index  [MS] Jacob Tellez MD             AS OF 15:30 EST VITALS:    BP - 103/52  HR - 90  TEMP - 96.6 °F (35.9 °C)  O2 SATS - 100%        DIAGNOSIS  Final diagnoses:   Altered mental status, unspecified altered mental status type   Symptomatic anemia   Dehydration         DISPOSITION  Admission           Jacob Tellez MD  02/04/21 3503      Electronically signed by Jacob Tellez,  MD at 21 1805         Current Facility-Administered Medications   Medication Dose Route Frequency Provider Last Rate Last Admin   • acetaminophen (TYLENOL) tablet 650 mg  650 mg Oral Q4H PRN Alice Mueller APRN        Or   • acetaminophen (TYLENOL) 160 MG/5ML solution 650 mg  650 mg Oral Q4H PRN Alice Mueller APRN        Or   • acetaminophen (TYLENOL) suppository 650 mg  650 mg Rectal Q4H PRN Alice Mueller APRN       • AZITHROMYCIN 500 MG/250 ML 0.9% NS IVPB (vial-mate)  500 mg Intravenous Q24H Alice Mueller APRN   500 mg at 21 1501   • budesonide-formoterol (SYMBICORT) 80-4.5 MCG/ACT inhaler 2 puff  2 puff Inhalation BID - RT Emanuel Carreno MD       • cefTRIAXone (ROCEPHIN) 1 g/100 mL 0.9% NS (MBP)  1 g Intravenous Q24H Alice Mueller APRN   1 g at 21 1501   • donepezil (ARICEPT) tablet 10 mg  10 mg Oral Nightly Emanuel Carreno MD       • ipratropium (ATROVENT) nebulizer solution 0.5 mg  0.5 mg Nebulization 4x Daily - RT Emanuel Carreno MD       • pravastatin (PRAVACHOL) tablet 40 mg  40 mg Oral Nightly Emanuel Carreno MD       • QUEtiapine (SEROquel) tablet 25 mg  25 mg Oral Nightly Emanuel Carreno MD       • sodium chloride 0.9 % flush 10 mL  10 mL Intravenous PRN Jacob Tellez MD       • sodium chloride 0.9 % flush 10 mL  10 mL Intravenous Q12H Alice Mueller APRN   10 mL at 21 0849   • sodium chloride 0.9 % infusion  75 mL/hr Intravenous Continuous Emanuel Carreno MD 75 mL/hr at 21 0340 75 mL/hr at 21 0340        Physician Progress Notes (last 72 hours) (Notes from 21 1456 through 21 1456)      Emanuel Carreno MD at 21 0904              [facility] Medicine Services  PROGRESS NOTE    Patient Name: Negrito Joe  : 1934  MRN: [id number]    Date of Admission: 2021  Primary Care Physician: Dany Astudillo MD    Subjective   Subjective      CC:  AMS    HPI:  Very agitated, confused overnight.  Had to put in restraints.  Was given seroquel 12.5mg last pm but didn't help per nursing.  Daughter at bedside and states that patient much more confused today.  Note she states that sometimes he will get a little confused at home.      ROS:  Unable to obtain d/t AMS    Objective   Objective     Vital Signs:   Temp:  [98 °F (36.7 °C)-98.4 °F (36.9 °C)] 98.2 °F (36.8 °C)  Heart Rate:  [] 119  Resp:  [18-20] 20  BP: (103-145)/(42-78) 145/78        Physical Exam:  Constitutional: awake and alert, laying in bed in 4 point restraints, daughter at bedside  HENT: NCAT, mucous membranes moist  Respiratory: Clear to auscultation bilaterally, respiratory effort normal   Cardiovascular: RRR, no murmurs, rubs, or gallops  Gastrointestinal: Positive bowel sounds, soft, nontender, nondistended  Musculoskeletal: No bilateral ankle edema  Psychiatric: Appropriate affect, cooperative  Neurologic: awake and alert, in bed in 4 point restraints nonsensical speech, moves all extremities  Skin: No rashes      Results Reviewed:  Results from last 7 days   Lab Units 02/04/21  0600 02/03/21  0547 02/02/21  1850 02/02/21  1245   WBC 10*3/mm3 9.75 9.16  --  11.63*   HEMOGLOBIN g/dL 7.2* 7.0* 5.5* 6.2*   HEMATOCRIT % 21.6* 21.1* 16.5* 17.7*   PLATELETS 10*3/mm3 447 387  --  413   PROCALCITONIN ng/mL  --   --   --  0.15     Results from last 7 days   Lab Units 02/04/21  0600 02/03/21  0547 02/02/21  1850 02/02/21  1245   SODIUM mmol/L 139 143  --  135*   POTASSIUM mmol/L 3.5 4.5  --  5.3*   CHLORIDE mmol/L 111* 111*  --  103   CO2 mmol/L 24.0 23.0  --  23.0   BUN mg/dL 25* 28*  --  34*   CREATININE mg/dL 1.43* 1.59*  --  1.74*   GLUCOSE mg/dL 101* 97  --  115*   CALCIUM mg/dL 8.6 8.3*  --  8.8   ALT (SGPT) U/L  --  11  --  14   AST (SGOT) U/L  --  19  --  23   TROPONIN T ng/mL  --   --   --  <0.010   PROBNP pg/mL  --   --  702.5 706.6     Estimated Creatinine Clearance: 35.8 mL/min  (A) (by C-G formula based on SCr of 1.43 mg/dL (H)).    Microbiology Results Abnormal     Procedure Component Value - Date/Time    Blood Culture - Blood, Arm, Right [[id number]] Collected: 02/02/21 1309    Lab Status: Preliminary result Specimen: Blood from Arm, Right Updated: 02/04/21 1330     Blood Culture No growth at 2 days    Blood Culture - Blood, Arm, Left [[id number]] Collected: 02/02/21 1300    Lab Status: Preliminary result Specimen: Blood from Arm, Left Updated: 02/04/21 1330     Blood Culture No growth at 2 days    Urine Culture - Urine, Urine, Clean Catch [[id number]]  (Normal) Collected: 02/02/21 1301    Lab Status: Final result Specimen: Urine, Clean Catch Updated: 02/03/21 1534     Urine Culture No growth    COVID PRE-OP / PRE-PROCEDURE SCREENING ORDER (NO ISOLATION) - Swab, Nasopharynx [[id number]]  (Normal) Collected: 02/02/21 1413    Lab Status: Final result Specimen: Swab from Nasopharynx Updated: 02/02/21 1442    Narrative:      The following orders were created for panel order COVID PRE-OP / PRE-PROCEDURE SCREENING ORDER (NO ISOLATION) - Swab, Nasopharynx.  Procedure                               Abnormality         Status                     ---------                               -----------         ------                     COVID-19, ABBOTT IN-HOUS...[[id number]]  Normal              Final result                 Please view results for these tests on the individual orders.    COVID-19, ABBOTT IN-HOUSE,NASAL Swab (NO TRANSPORT MEDIA) 2 HR TAT - Swab, Nasopharynx [[id number]]  (Normal) Collected: 02/02/21 1413    Lab Status: Final result Specimen: Swab from Nasopharynx Updated: 02/02/21 1442     COVID19 Presumptive Negative    Narrative:      Fact sheet for providers: https://www.fda.gov/media/756542/download     Fact sheet for patients: https://www.fda.gov/media/523634/download    Test performed by PCR.  If inconsistent with clinical signs and symptoms patient should be tested with different  authorized molecular test.          Imaging Results (Last 24 Hours)     ** No results found for the last 24 hours. **               I have reviewed the medications:  Scheduled Meds:azithromycin, 500 mg, Intravenous, Q24H  cefTRIAXone, 1 g, Intravenous, Q24H  sodium chloride, 10 mL, Intravenous, Q12H      Continuous Infusions:sodium chloride, 75 mL/hr, Last Rate: 75 mL/hr (02/05/21 0340)      PRN Meds:.•  acetaminophen **OR** acetaminophen **OR** acetaminophen  •  sodium chloride    Assessment/Plan   Assessment & Plan     Active Hospital Problems    Diagnosis  POA   • **AMS (altered mental status) [R41.82]  Unknown   • Hemolytic anemia (CMS/HCC) [D58.9]  Yes   • CELY (acute kidney injury) (CMS/HCC) [N17.9]  Yes   • Hypotension [I95.9]  Unknown   • Monoclonal gammopathy [D47.2]  Yes   • Hyperlipidemia [E78.5]  Yes   • 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        Brief Hospital Course to date:  Negrito Joe is a 86 y.o. 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  The patient was brought to the ER by his son in law who provided the history.  The patient has become more confused over the last 2 days.  He normally lives alone with help during the day.  On admit patient was without complaints but confused.     In the ER, creatinine 1.74 (baseline 0.78).  H/H 6.2/17.7 (2 months ago was 11/36).  CT head is nonacute.  COVID negative.  Patient received azithromycin and Rocephin in the ER due to CXR with mild left basilar atelectasis versus early changes of PNA.  No cough or fever though.  UA with small leukocytes and moderate WBC but no bacteria on micro.      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   Son in law reports this is new x 2 days, reports 1 similar episode and was d/t AI  hemolytic anemia then?  --CT head with no acute finding  --?dementia exacerbated by mild UTI versus mild early pneumonia or bronchitis.    --MRI was limited as only diffusion sequences obtained,  No acute infarct    Dementia  --continue home aricept.  Seroquel for agitation, will give a dose now and increase dose to 25mg qhs for night.  Cont restraints for now     Anemia  Hx of cold autoimmune hemolytic anemia  Monoclonal gammopathy.    --hgb as low as 5.5 in ER. Stool for guiac negative, specimen was double checked and considered accurate, s/p 2units prbc on 2021, Hb has been stable since  -Per Dr Hurd  only tx for now is to transfuse.  However, PRBCs MUST BE TRANSFUSED WITH A BLOOD WARMER.  --IVFs as needed okay but should either warm IVF or warm pt (ie warming blankets etc)  --per update from patient's PCP/Dr. Astudillo his daughter reports patient has been standing outside without coat talking with neighbor and Dr. Hurd agrees could explain hemolysis     CELY  -improving with IVF    Hypotension  dehydration  -BP better today, cont NS at  75/hr, must keep warm       Mild leukocytosis  Atelectasis vs early mild PNA changes   Possible UTI  --Cx's no growth currently  --CXR with Lt basilar atelectasis vs changes of early PNA  --V rocephin/azith Day 4.        BLE chronic edema  --family reports x 10 + years  --no known hx of CHF    --HLD  Asthma  --Home meds, oxygen as needed    Bereavement  -passing of his wife 2020, her  is next  at [facility] but Dr. Hurd has discussed with daughter that patient is not in shape to withstand the flight or the cold     DVT prophylaxis: sequentials, No a/c due to anemia        Disposition: I expect the patient to be discharged TBD    CODE STATUS:   Code Status and Medical Interventions:   Ordered at: 21 3133     Level Of Support Discussed With:    Patient    Next of Kin (If No Surrogate)     Code Status:    CPR     Medical Interventions  (Level of Support Prior to Arrest):    Full       Emanuel Carreno MD  02/05/21                Electronically signed by Emanuel Carreno MD at 02/05/21 1211     Nam Hurd MD at 02/04/21 1926          HEMATOLOGY/ONCOLOGY PROGRESS NOTE    Subjective      CC: Confused    SUBJECTIVE: Denies complaints        Past Medical History, Past Surgical History, Social History, Family History have been reviewed and are without significant changes except as mentioned.      Medications:  The current medication list was reviewed in the EMR    ALLERGIES:   Allergies   Allergen Reactions   • Bactrim [Sulfamethoxazole-Trimethoprim]    • Tetracycline Rash       ROS:  A comprehensive 14 point review of systems was performed and was negative except as mentioned.      Objective      Vitals:    02/04/21 0737 02/04/21 1141 02/04/21 1726 02/04/21 1904   BP: 129/58 118/44 103/64 108/42   BP Location: Right arm Right arm Right arm Right arm   Patient Position: Lying Lying Lying Lying   Pulse: 97 71 74 85   Resp: 18 18 18 18   Temp: 98.8 °F (37.1 °C) 98.2 °F (36.8 °C) 98.4 °F (36.9 °C) 98.4 °F (36.9 °C)   TempSrc: Oral Oral Oral Oral   SpO2:       Weight:       Height:            ECOG: (4) Completely Disabled, Unable to Carry Out Self Care & Confined to Bed or Chair    General: Frail and disheveled appearing, in no acute distress  HEENT: sclerae anicteric, oropharynx clear  Lymphatics: no cervical, supraclavicular, inguinal, or axillary adenopathy  Cardiovascular: regular rate and rhythm, no murmurs  Lungs: clear to auscultation bilaterally  Abdomen: soft, nontender, nondistended.  No palpable organomegaly  Extremities: no lower extremity edema  Skin: no rashes, lesions, bruising, or petechiae  Neuro: Alert but disoriented to place and time and reality. Moves all extremities.    RECENT LABS:    Results from last 7 days   Lab Units 02/04/21  0600 02/03/21  0547 02/02/21  1850 02/02/21  1245   WBC 10*3/mm3 9.75 9.16  --  11.63*   HEMOGLOBIN  g/dL 7.2* 7.0* 5.5* 6.2*   PLATELETS 10*3/mm3 447 387  --  413     Results from last 7 days   Lab Units 02/04/21  0600 02/03/21  0547 02/02/21  1245   SODIUM mmol/L 139 143 135*   POTASSIUM mmol/L 3.5 4.5 5.3*   CO2 mmol/L 24.0 23.0 23.0   BUN mg/dL 25* 28* 34*   CREATININE mg/dL 1.43* 1.59* 1.74*   GLUCOSE mg/dL 101* 97 115*     Results from last 7 days   Lab Units 02/03/21  0547 02/02/21  1245   AST (SGOT) U/L 19 23   ALT (SGPT) U/L 11 14   BILIRUBIN mg/dL 2.8* 2.0*   ALK PHOS U/L 71 84         Ct Head Without Contrast    Result Date: 2/3/2021  Age-appropriate generalized cerebral atrophy. No evidence of acute intracranial disease. Incidentally noted paranasal sinus disease.  D:  02/02/2021 E:  02/02/2021   This report was finalized on 2/3/2021 9:23 PM by Dr. Christopher Head MD.      Mri Brain Without Contrast    Result Date: 2/3/2021  Limited MRI with diffusion sequences only obtained.No acute infarct is noted. Redemonstrated diffuse volume loss. No obvious global mass effect, midline shift or increased ventricular caliber.   This report was finalized on 2/3/2021 9:33 AM by Magdaleno Tenorio.      Xr Chest 1 View    Result Date: 2/2/2021  1. Pulmonary venous hypertension, and possibly minimal/early interstitial edema. 2. Mild left basilar atelectasis versus early changes of pneumonia.   D:  02/02/2021 E:  02/02/2021  This report was finalized on 2/2/2021 10:36 PM by Dr. Christopher Head MD.                Assessment   ASSESSMENT & PLAN:  1. 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  In general he is actually done fairly well just by keeping warm but recently had been socializing outdoors without his overcoat on and that probably necessitated that.  Unfortunately, his wife is being buried at  [facility] next week.  His daughter was hoping to fly him there but I do not think he is in any shape to withstand the flight nor to withstand the cold and I spoke with her this evening and she understands this.  Both she and I are in favor of best supportive care and warm transfusion support and nothing more.  This does not respond to steroids or splenectomy.  Rituxan can help but takes months to work and I do not think he would be inclined towards that as we have talked about it in more coherent days of his life.  Total time of care today discussing and reviewing records and treatment options etc. consumed 1 hour of patient care time today.                    Nam Hurd MD    2021                         Electronically signed by Nam Hurd MD at 21     Emanuel Carreno MD at 21 0859              [facility] Medicine Services  PROGRESS NOTE    Patient Name: Negrito Joe  : 1934  MRN: [id number]    Date of Admission: 2021  Primary Care Physician: Dany Astudillo MD    Subjective   Subjective     CC:  AMS    HPI:  Awake and alert and sitting in chair but confused.  Daughter at bedside.  Nurse states that last pm when he woke up that he was fairly clear/oriented x 3, now more confused today.    ROS:  Unable to obtain d/t AMS    Objective   Objective     Vital Signs:   Temp:  [98 °F (36.7 °C)-99.6 °F (37.6 °C)] 99.6 °F (37.6 °C)  Heart Rate:  [65-97] 97  Resp:  [18] 18  BP: ()/(33-58) 129/58        Physical Exam:  Constitutional: awake and alert, sitting up in chair, daughter at bedside  HENT: NCAT, mucous membranes moist  Respiratory: Clear to auscultation bilaterally, respiratory effort normal   Cardiovascular: RRR, no murmurs, rubs, or gallops  Gastrointestinal: Positive bowel sounds, soft, nontender, nondistended  Musculoskeletal: No bilateral ankle edema  Psychiatric: Appropriate affect, cooperative  Neurologic: awake and alert,  sitting up in chair, nonsensical speech, moves all extremities  Skin: No rashes      Results Reviewed:  Results from last 7 days   Lab Units 02/04/21  0600 02/03/21  0547 02/02/21  1850 02/02/21  1245   WBC 10*3/mm3 9.75 9.16  --  11.63*   HEMOGLOBIN g/dL 7.2* 7.0* 5.5* 6.2*   HEMATOCRIT % 21.6* 21.1* 16.5* 17.7*   PLATELETS 10*3/mm3 447 387  --  413   PROCALCITONIN ng/mL  --   --   --  0.15     Results from last 7 days   Lab Units 02/04/21  0600 02/03/21  0547 02/02/21  1850 02/02/21  1245   SODIUM mmol/L 139 143  --  135*   POTASSIUM mmol/L 3.5 4.5  --  5.3*   CHLORIDE mmol/L 111* 111*  --  103   CO2 mmol/L 24.0 23.0  --  23.0   BUN mg/dL 25* 28*  --  34*   CREATININE mg/dL 1.43* 1.59*  --  1.74*   GLUCOSE mg/dL 101* 97  --  115*   CALCIUM mg/dL 8.6 8.3*  --  8.8   ALT (SGPT) U/L  --  11  --  14   AST (SGOT) U/L  --  19  --  23   TROPONIN T ng/mL  --   --   --  <0.010   PROBNP pg/mL  --   --  702.5 706.6     Estimated Creatinine Clearance: 35.8 mL/min (A) (by C-G formula based on SCr of 1.43 mg/dL (H)).    Microbiology Results Abnormal     Procedure Component Value - Date/Time    Urine Culture - Urine, Urine, Clean Catch [[id number]]  (Normal) Collected: 02/02/21 1301    Lab Status: Final result Specimen: Urine, Clean Catch Updated: 02/03/21 1534     Urine Culture No growth    Blood Culture - Blood, Arm, Right [[id number]] Collected: 02/02/21 1309    Lab Status: Preliminary result Specimen: Blood from Arm, Right Updated: 02/03/21 1330     Blood Culture No growth at 24 hours    Blood Culture - Blood, Arm, Left [[id number]] Collected: 02/02/21 1300    Lab Status: Preliminary result Specimen: Blood from Arm, Left Updated: 02/03/21 1330     Blood Culture No growth at 24 hours    COVID PRE-OP / PRE-PROCEDURE SCREENING ORDER (NO ISOLATION) - Swab, Nasopharynx [[id number]]  (Normal) Collected: 02/02/21 1413    Lab Status: Final result Specimen: Swab from Nasopharynx Updated: 02/02/21 1442    Narrative:      The following  orders were created for panel order COVID PRE-OP / PRE-PROCEDURE SCREENING ORDER (NO ISOLATION) - Swab, Nasopharynx.  Procedure                               Abnormality         Status                     ---------                               -----------         ------                     COVID-19, ABBOTT IN-HOUS...[[id number]]  Normal              Final result                 Please view results for these tests on the individual orders.    COVID-19, ABBOTT IN-HOUSE,NASAL Swab (NO TRANSPORT MEDIA) 2 HR TAT - Swab, Nasopharynx [[id number]]  (Normal) Collected: 02/02/21 1413    Lab Status: Final result Specimen: Swab from Nasopharynx Updated: 02/02/21 1442     COVID19 Presumptive Negative    Narrative:      Fact sheet for providers: https://www.fda.gov/media/675896/download     Fact sheet for patients: https://www.fda.gov/media/822846/download    Test performed by PCR.  If inconsistent with clinical signs and symptoms patient should be tested with different authorized molecular test.          Imaging Results (Last 24 Hours)     Procedure Component Value Units Date/Time    CT Head Without Contrast [[id number]] Collected: 02/02/21 1401     Updated: 02/03/21 2126    Narrative:      EXAMINATION: CT HEAD WO CONTRAST- 02/02/2021     INDICATION: AMS     TECHNIQUE: 5 mm unenhanced images through the brain     The radiation dose reduction device was turned on for each scan per the  ALARA (As Low as Reasonably Achievable) protocol.     COMPARISON: NONE     FINDINGS: The calvarium appears intact. There is evidence of previous  paranasal sinus surgery, and opacification of much of the remaining  paranasal sinuses. There is also trace left mastoid disease. Soft tissue  window images show expected degree of generalized cerebral atrophy for  age. There is no evidence of mass or mass effect, edema, infarct,  hemorrhage, hydrocephalus, or abnormal extra-axial collection. No gross  abnormalities are seen of the orbits.       Impression:       Age-appropriate generalized cerebral atrophy. No evidence of  acute intracranial disease. Incidentally noted paranasal sinus disease.     D:  02/02/2021  E:  02/02/2021        This report was finalized on 2/3/2021 9:23 PM by Dr. Christopher Head MD.       MRI Brain Without Contrast [[id number]] Collected: 02/03/21 0932     Updated: 02/03/21 0936    Narrative:      EXAMINATION: MRI BRAIN WO CONTRAST-      INDICATION: Mental status change, unknown cause; R41.82-Altered mental  status, unspecified; D64.9-Anemia, unspecified; E86.0-Dehydration     TECHNIQUE: Diffusion scans only were obtained due to patient  intolerance.     COMPARISON: CT head 1 day prior     FINDINGS: No acute infarct is noted on diffusion weighted sequences.  Redemonstrated diffuse volume loss. No obvious global mass effect,  midline shift or increased ventricular caliber.       Impression:      Limited MRI with diffusion sequences only obtained.No acute  infarct is noted. Redemonstrated diffuse volume loss. No obvious global  mass effect, midline shift or increased ventricular caliber.        This report was finalized on 2/3/2021 9:33 AM by Magdaleno Tenorio.                  I have reviewed the medications:  Scheduled Meds:azithromycin, 500 mg, Intravenous, Q24H  cefTRIAXone, 1 g, Intravenous, Q24H  sodium chloride, 10 mL, Intravenous, Q12H      Continuous Infusions:sodium chloride, 75 mL/hr, Last Rate: 75 mL/hr (02/03/21 1306)      PRN Meds:.•  acetaminophen **OR** acetaminophen **OR** acetaminophen  •  sodium chloride    Assessment/Plan   Assessment & Plan     Active Hospital Problems    Diagnosis  POA   • **AMS (altered mental status) [R41.82]  Unknown   • Hemolytic anemia (CMS/HCC) [D58.9]  Yes   • CELY (acute kidney injury) (CMS/HCC) [N17.9]  Yes   • Hypotension [I95.9]  Unknown   • Monoclonal gammopathy [D47.2]  Yes   • Hyperlipidemia [E78.5]  Yes   • 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        Brief Hospital Course to date:  Negrito Joe is a 86 y.o. 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  The patient was brought to the ER by his son in law who provided the history.  The patient has become more confused over the last 2 days.  He normally lives alone with help during the day.  On admit patient was without complaints but confused.     In the ER, creatinine 1.74 (baseline 0.78).  H/H 6.2/17.7 (2 months ago was 11/36).  CT head is nonacute.  COVID negative.  Patient received azithromycin and Rocephin in the ER due to CXR with mild left basilar atelectasis versus early changes of PNA.  No cough or fever though.  UA with small leukocytes and moderate WBC but no bacteria on micro.      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  Pt unaware that he is confused. Son in law reports this is new x 2 days, reports 1 similar episode and was his AI hemolytic anemia then  --CT head with no acute finding  --Likely related to a mild UTI versus mild early pneumonia or bronchitis.    --MRI was limited as only diffusion sequences obtained,  No acute infarct        Anemia  Hx of cold autoimmune hemolytic anemia  Monoclonal gammopathy  --followed by Dr POOL Hurd patient, consulted.    --hgb as low as 5.5 in ER. Stool for guiac negative, specimen was double checked and considered accurate, s/p 2units prbc on 2/2/2021   -Per Dr Hurd should assure his h/h was kept warm and immediately run for accuracy.  IF accurate value then only tx for now is to transfuse.  However, PRBCs MUST BE TRANSFUSED WITH A BLOOD WARMER.  --IVFs as needed okay but should either warm IVF or warm pt (ie warming blankets etc)  --per update from patient's PCP/Dr. Astudillo his daughter reports patient has been standing outside without coat talking with neighbor and  Dr. Hurd agrees could explain hemolysis     CELY  -mild improvement with IVF    Hypotension  dehydration  -BP better today, cont NS at  75/hr, must keep warm       Mild leukocytosis  Atelectasis vs early mild PNA changes   Possible UTI  --Cx's no growth currently  --CXR with Lt basilar atelectasis vs changes of early PNA  --V rocephin/azith Day 3.  Await urine culture and monitor respiratory status.       BLE chronic edema  --family reports x 10 + years  --no known hx of CHF  --add bnp to ER labs   --may need diuresis esther with getting IVFs and blood      HLD  Asthma  --Home meds, oxygen as needed    Bereavement  -passing of his wife 2020, her  is next  at [facility]      DVT prophylaxis: sequentials, No a/c due to anemia        Disposition: I expect the patient to be discharged TBD    CODE STATUS:   Code Status and Medical Interventions:   Ordered at: 21 1573     Level Of Support Discussed With:    Patient    Next of Kin (If No Surrogate)     Code Status:    CPR     Medical Interventions (Level of Support Prior to Arrest):    Full       Emanuel Carreno MD  21                Electronically signed by Emanuel Carreno MD at 21 1613     Emanuel Carreno MD at 21 0922              [facility] Medicine Services  PROGRESS NOTE    Patient Name: Negrito Joe  : 1934  MRN: [id number]    Date of Admission: 2021  Primary Care Physician: Dany Astudillo MD    Subjective   Subjective     CC:  AMS    HPI:  Confused overnight and was given ativan so would hold still for MRI.  Son in law at bedside.  Patient heavily sedated still, will arouse but goes back to sleep    ROS:  Unable to obtain d/t AMS    Objective   Objective     Vital Signs:   Temp:  [96.6 °F (35.9 °C)-99.6 °F (37.6 °C)] 98.8 °F (37.1 °C)  Heart Rate:  [] 76  Resp:  [16-22] 18  BP: ()/(39-67) 97/47        Physical Exam:  Constitutional: No acute distress,  sedated, will arouse but goes back to sleep, son in law at bedside  HENT: NCAT, mucous membranes moist  Respiratory: Clear to auscultation bilaterally, respiratory effort normal   Cardiovascular: RRR, no murmurs, rubs, or gallops  Gastrointestinal: Positive bowel sounds, soft, nontender, nondistended  Musculoskeletal: No bilateral ankle edema  Psychiatric: Appropriate affect, cooperative  Neurologic: sedated from ativan, will arouse but goes back to sleep  Skin: No rashes      Results Reviewed:  Results from last 7 days   Lab Units 02/03/21  0547 02/02/21  1850 02/02/21  1245   WBC 10*3/mm3 9.16  --  11.63*   HEMOGLOBIN g/dL 7.0* 5.5* 6.2*   HEMATOCRIT % 21.1* 16.5* 17.7*   PLATELETS 10*3/mm3 387  --  413   PROCALCITONIN ng/mL  --   --  0.15     Results from last 7 days   Lab Units 02/03/21  0547 02/02/21  1850 02/02/21  1245   SODIUM mmol/L 143  --  135*   POTASSIUM mmol/L 4.5  --  5.3*   CHLORIDE mmol/L 111*  --  103   CO2 mmol/L 23.0  --  23.0   BUN mg/dL 28*  --  34*   CREATININE mg/dL 1.59*  --  1.74*   GLUCOSE mg/dL 97  --  115*   CALCIUM mg/dL 8.3*  --  8.8   ALT (SGPT) U/L 11  --  14   AST (SGOT) U/L 19  --  23   TROPONIN T ng/mL  --   --  <0.010   PROBNP pg/mL  --  702.5 706.6     Estimated Creatinine Clearance: 32.2 mL/min (A) (by C-G formula based on SCr of 1.59 mg/dL (H)).    Microbiology Results Abnormal     Procedure Component Value - Date/Time    COVID PRE-OP / PRE-PROCEDURE SCREENING ORDER (NO ISOLATION) - Swab, Nasopharynx [[id number]]  (Normal) Collected: 02/02/21 1413    Lab Status: Final result Specimen: Swab from Nasopharynx Updated: 02/02/21 1442    Narrative:      The following orders were created for panel order COVID PRE-OP / PRE-PROCEDURE SCREENING ORDER (NO ISOLATION) - Swab, Nasopharynx.  Procedure                               Abnormality         Status                     ---------                               -----------         ------                     COVID-19, ABBOTT  IN-HOUS...[[id number]]  Normal              Final result                 Please view results for these tests on the individual orders.    COVID-19, ABBOTT IN-HOUSE,NASAL Swab (NO TRANSPORT MEDIA) 2 HR TAT - Swab, Nasopharynx [[id number]]  (Normal) Collected: 02/02/21 1413    Lab Status: Final result Specimen: Swab from Nasopharynx Updated: 02/02/21 1442     COVID19 Presumptive Negative    Narrative:      Fact sheet for providers: https://www.fda.gov/media/213265/download     Fact sheet for patients: https://www.fda.gov/media/481609/download    Test performed by PCR.  If inconsistent with clinical signs and symptoms patient should be tested with different authorized molecular test.          Imaging Results (Last 24 Hours)     Procedure Component Value Units Date/Time    MRI Brain Without Contrast [[id number]] Resulted: 02/02/21 2034     Updated: 02/03/21 0907    XR Chest 1 View [[id number]] Collected: 02/02/21 1343     Updated: 02/02/21 2239    Narrative:      EXAMINATION: XR CHEST 1 VW-02/02/2021:     INDICATION: Weak/Dizzy/AMS, triage protocol.     COMPARISON: NONE.     FINDINGS: The heart shadow is in the upper range of normal size. The  vasculature is cephalized. There is a mild diffuse interstitial disease  pattern, resembling early changes of interstitial edema. There is  probably a component of chronic pulmonary change as well. In addition,  there is focal, rather mild patchy change of the left lung base that  could represent atelectasis or perhaps mild changes of pneumonia. The  right lung base appears clear allowing for the overlying shadows of the  calcified costal cartilages. The bony structures appear to be intact.       Impression:      1. Pulmonary venous hypertension, and possibly minimal/early  interstitial edema.  2. Mild left basilar atelectasis versus early changes of pneumonia.      D:  02/02/2021  E:  02/02/2021     This report was finalized on 2/2/2021 10:36 PM by Dr. Christopher Head MD.       CT Head  Without Contrast [[id number]] Collected: 02/02/21 1401     Updated: 02/02/21 1406    Narrative:      EXAMINATION: CT HEAD WO CONTRAST- 02/02/2021     INDICATION: AMS     TECHNIQUE: 5 mm unenhanced images through the brain     The radiation dose reduction device was turned on for each scan per the  ALARA (As Low as Reasonably Achievable) protocol.     COMPARISON: NONE     FINDINGS: The calvarium appears intact. There is evidence of previous  paranasal sinus surgery, and opacification of much of the remaining  paranasal sinuses. There is also trace left mastoid disease. Soft tissue  window images show expected degree of generalized cerebral atrophy for  age. There is no evidence of mass or mass effect, edema, infarct,  hemorrhage, hydrocephalus, or abnormal extra-axial collection. No gross  abnormalities are seen in the orbits.       Impression:      Age-appropriate generalized cerebral atrophy. No evidence of  acute intracranial disease. Incidentally noted paranasal sinus disease.     D:  02/02/2021  E:  02/02/2021                     I have reviewed the medications:  Scheduled Meds:azithromycin, 500 mg, Intravenous, Q24H  cefTRIAXone, 1 g, Intravenous, Q24H  sodium chloride, 10 mL, Intravenous, Q12H      Continuous Infusions:sodium chloride, 50 mL/hr, Last Rate: 50 mL/hr (02/02/21 1819)      PRN Meds:.•  acetaminophen **OR** acetaminophen **OR** acetaminophen  •  sodium chloride    Assessment/Plan   Assessment & Plan     Active Hospital Problems    Diagnosis  POA   • **AMS (altered mental status) [R41.82]  Unknown   • Hemolytic anemia (CMS/HCC) [D58.9]  Yes   • CELY (acute kidney injury) (CMS/HCC) [N17.9]  Yes   • Hypotension [I95.9]  Unknown   • Monoclonal gammopathy [D47.2]  Yes   • Hyperlipidemia [E78.5]  Yes   • 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        Brief Hospital Course to date:  Negrito Joe is a 86 y.o. 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  The patient was brought to the ER by his son in law who provided the history.  The patient has become more confused over the last 2 days.  He normally lives alone with help during the day.  On admit patient was without complaints but confused.     In the ER, creatinine 1.74 (baseline 0.78).  H/H 6.2/17.7 (2 months ago was 11/36).  CT head is nonacute.  COVID negative.  Patient received azithromycin and Rocephin in the ER due to CXR with mild left basilar atelectasis versus early changes of PNA.  No cough or fever though.  UA with small leukocytes and moderate WBC but no bacteria on micro.      AMS x 2 days PTA  --per son.  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  Pt unaware that he is confused. Son in law reports this is new x 2 days, reports 1 similar episode and was his AI hemolytic anemia then  --CT head with no acute finding  --Likely related to a mild UTI versus mild early pneumonia or bronchitis.    --MRI was limited as only diffusion sequences obtained,  No acute infarct        Anemia  Hx of cold autoimmune hemolytic anemia  Monoclonal gammopathy  --followed by Dr POOL Hurd patient, will consult as family would like to see.  --hgb as low as 5.5 in ER. Stool for guiac negative, specimen was double checked and considered accurate, s/p 2units prbc on 2/2/2021   -Per Dr Hurd should assure his h/h was kept warm and immediately run for accuracy.  IF accurate value then only tx for now is to transfuse.  However, PRBCs MUST BE TRANSFUSED WITH A BLOOD WARMER.  --IVFs as needed okay but should either warm IVF or warm pt (ie warming blankets etc)  --per update from patient's PCP/Dr. Astudillo his daughter reports patient has been standing outside without coat talking with neighbor and Dr. Hurd agrees could explain  hemolysis     CELY  Hypotension  dehydration  -continues to be mildly hypotensive, will increase IVF NS from 50/hr to 75/hr, must keep warm       Mild leukocytosis  Atelectasis vs early mild PNA changes   Possible UTI  --Cx pending  --CXR with Lt basilar atelectasis vs changes of early PNA  --V rocephin/azith Day 2.  Await urine culture and monitor respiratory status.       BLE chronic edema  --family reports x 10 + years  --no known hx of CHF  --add bnp to ER labs   --may need diuresis esther with getting IVFs and blood      HLD  Asthma  --Home meds, oxygen as needed    Bereavement  -passing of his wife 2020, her  is next  at [facility]      DVT prophylaxis: sequentials, No a/c due to anemia        Disposition: I expect the patient to be discharged TBD    CODE STATUS:   Code Status and Medical Interventions:   Ordered at: 21 175     Level Of Support Discussed With:    Patient    Next of Kin (If No Surrogate)     Code Status:    CPR     Medical Interventions (Level of Support Prior to Arrest):    Full       Emanuel Carreno MD  21                Electronically signed by Emanuel Carreno MD at 21 1309          Consult Notes (last 72 hours) (Notes from 21 1456 through 21 1456)      Cramen Riley, APRN at 21 1539      Consult Orders    1. Inpatient Hematology & Oncology Consult [[id number]] ordered by Emanuel Carreno MD at 21 1250               HEMATOLOGY/ONCOLOGY INPATIENT CONSULT    REASON FOR CONSULT: anemia    Subjective   HISTORY OF PRESENT ILLNESS; I am asked to see this 86 y.o.  male with past medical history significant for cold autoimmune hemolytic anemia, monoclonal gammopathy, asthma and HLD.  He was brought to the ED for confusion.  He was found to have acute kidney injury and anemia with hemoglobin 6.2. Imaging of head was unremarkable.  He was started on antibiotic for possible UTI and transfused 2 units of PRBCs.  Patient was  sleeping when seen.  He would arouse but went right back to sleep.  No family at bedside.  Hemoglobin 7.0 today.  Continuing to receive warm IV fluids and remains mildly hypotensive.  Creatinine 1.59 today.        Past Medical History:   Diagnosis Date   • Asthma      Past Surgical History:   Procedure Laterality Date   • COLONOSCOPY     • HERNIA REPAIR      1982 & 2014   • SINUS SURGERY      Multiple       No current facility-administered medications on file prior to encounter.      Current Outpatient Medications on File Prior to Encounter   Medication Sig Dispense Refill   • Azelastine-Fluticasone 137-50 MCG/ACT suspension 1 spray into the nostril(s) as directed by provider 2 (Two) Times a Day As Needed.     • donepezil (ARICEPT) 10 MG tablet Take 10 mg by mouth Every Night.     • Fluticasone-Umeclidin-Vilant 100-62.5-25 MCG/INH aerosol powder  Inhale 1 puff Daily.     • Multiple Vitamins-Minerals (PRESERVISION AREDS PO) Take 1 tablet by mouth 2 (Two) Times a Day.     • pravastatin (PRAVACHOL) 40 MG tablet Take 40 mg by mouth Every Night.     • Tiotropium Bromide Monohydrate (SPIRIVA RESPIMAT) 1.25 MCG/ACT aerosol solution inhaler Inhale 2 puffs Daily.         Allergies   Allergen Reactions   • Bactrim [Sulfamethoxazole-Trimethoprim]    • Tetracycline Rash       Social History     Socioeconomic History   • Marital status:      Spouse name: Not on file   • Number of children: Not on file   • Years of education: Not on file   • Highest education level: Not on file   Tobacco Use   • Smoking status: Never Smoker   • Smokeless tobacco: Never Used   Substance and Sexual Activity   • Alcohol use: Yes     Comment: occ- 4x/monthly   • Drug use: No   • Sexual activity: Defer       Family History   Problem Relation Age of Onset   • Aneurysm Father    • Heart attack Father    • Cancer Other          REVIEW OF SYSTEMS:  A 14 point review of systems was performed and is negative except as noted above.    Objective  "  PHYSICAL EXAM:    BP 92/48 (BP Location: Left arm, Patient Position: Lying)   Pulse 75   Temp 99 °F (37.2 °C) (Axillary)   Resp 18   Ht 177.8 cm (70\")   Wt 68.3 kg (150 lb 8 oz)   SpO2 100%   BMI 21.59 kg/m²       General: sleeping in bed, no acute distress, no family at bedside  Neck: Supple.   Cardiovascular: regular rate and rhythm, no murmurs  Lungs: clear to auscultation bilaterally. No respiratory distress  Abdomen: soft, nontender, nondistended.    Extremities: no lower extremity edema, cyanosis, or clubbing  Skin: no rashes, lesions, bruising, or petechiae  Neuro: Sleeping, will arouse but goes back to sleep    Results:    Results from last 7 days   Lab Units 02/03/21  0547 02/02/21  1850 02/02/21  1245   WBC 10*3/mm3 9.16  --  11.63*   HEMOGLOBIN g/dL 7.0* 5.5* 6.2*   PLATELETS 10*3/mm3 387  --  413     Results from last 7 days   Lab Units 02/03/21  0547 02/02/21  1245   SODIUM mmol/L 143 135*   POTASSIUM mmol/L 4.5 5.3*   CO2 mmol/L 23.0 23.0   BUN mg/dL 28* 34*   CREATININE mg/dL 1.59* 1.74*   GLUCOSE mg/dL 97 115*     Results from last 7 days   Lab Units 02/03/21  0547 02/02/21  1245   AST (SGOT) U/L 19 23   ALT (SGPT) U/L 11 14   BILIRUBIN mg/dL 2.8* 2.0*   ALK PHOS U/L 71 84         Ct Head Without Contrast    Result Date: 2/2/2021  Age-appropriate generalized cerebral atrophy. No evidence of acute intracranial disease. Incidentally noted paranasal sinus disease.  D:  02/02/2021 E:  02/02/2021       Mri Brain Without Contrast    Result Date: 2/3/2021  Limited MRI with diffusion sequences only obtained.No acute infarct is noted. Redemonstrated diffuse volume loss. No obvious global mass effect, midline shift or increased ventricular caliber.   This report was finalized on 2/3/2021 9:33 AM by Magdaleno Tenorio.      Xr Chest 1 View    Result Date: 2/2/2021  1. Pulmonary venous hypertension, and possibly minimal/early interstitial edema. 2. Mild left basilar atelectasis versus early changes of " pneumonia.   D:  02/02/2021 E:  02/02/2021  This report was finalized on 2/2/2021 10:36 PM by Dr. Christopher Head MD.        Assessment    ASSESSMENT & PLAN:  1.  Cold autoimmune hemolytic anemia  2.  Monoclonal gammopathy  3.  Acute kidney injury    Discussion:  Patient sleeping and no family at bedside, unsure if his mental status is back to baseline.  Discussed briefly with Dr. Hurd who does not recommend any steroids or further work up.  Recommend to continue transfusions as needed with a blood warmer.  Continue IV fluids and antibiotics per hospitalist, cultures pending.  Dr. Hurd will follow up with patient tomorrow.      YURIDIA Mahmood    2/3/2021              Electronically signed by Carmen Riley, YURIDIA at 02/03/21 2138

## 2021-02-05 NOTE — PROGRESS NOTES
HEMATOLOGY/ONCOLOGY PROGRESS NOTE    Subjective      CC: Frail    SUBJECTIVE: Still quite frail and pleasantly disoriented but denies new complaints        Past Medical History, Past Surgical History, Social History, Family History have been reviewed and are without significant changes except as mentioned.      Medications:  The current medication list was reviewed in the EMR    ALLERGIES:   Allergies   Allergen Reactions   • Bactrim [Sulfamethoxazole-Trimethoprim]    • Tetracycline Rash       ROS:  A comprehensive 14 point review of systems was performed and was negative except as mentioned.      Objective      Vitals:    02/05/21 0915 02/05/21 1000 02/05/21 1131 02/05/21 1200   BP:   139/72    BP Location:   Right arm    Patient Position:   Lying    Pulse: 98 95 120 114   Resp:   22    Temp:   98.2 °F (36.8 °C)    TempSrc:   Oral    SpO2: 91% 90%  92%   Weight:       Height:            ECOG: (3) Capable of Limited Self Care, Confined to Bed or Chair Greater Than 50% of Waking Hours    General: Frail appearing, in no acute distress  HEENT: sclerae anicteric, oropharynx clear  Lymphatics: no cervical, supraclavicular, inguinal, or axillary adenopathy  Cardiovascular: regular rate and rhythm, no murmurs  Lungs: clear to auscultation bilaterally  Abdomen: soft, nontender, nondistended.  No palpable organomegaly  Extremities: no lower extremity edema  Skin: no rashes, lesions, bruising, or petechiae  Neuro: Alert and oriented x 3. Moves all extremities.    RECENT LABS:    Results from last 7 days   Lab Units 02/05/21  0840 02/04/21  0600 02/03/21  0547   WBC 10*3/mm3 10.75 9.75 9.16   HEMOGLOBIN g/dL 7.1* 7.2* 7.0*   PLATELETS 10*3/mm3 487* 447 387     Results from last 7 days   Lab Units 02/05/21  0840 02/04/21  0600 02/03/21  0547   SODIUM mmol/L 140 139 143   POTASSIUM mmol/L 4.3 3.5 4.5   CO2 mmol/L 23.0 24.0 23.0   BUN mg/dL 18 25* 28*   CREATININE mg/dL 1.27 1.43* 1.59*   GLUCOSE mg/dL 108* 101* 97     Results  from last 7 days   Lab Units 02/03/21  0547 02/02/21  1245   AST (SGOT) U/L 19 23   ALT (SGPT) U/L 11 14   BILIRUBIN mg/dL 2.8* 2.0*   ALK PHOS U/L 71 84         Ct Head Without Contrast    Result Date: 2/3/2021  Age-appropriate generalized cerebral atrophy. No evidence of acute intracranial disease. Incidentally noted paranasal sinus disease.  D:  02/02/2021 E:  02/02/2021   This report was finalized on 2/3/2021 9:23 PM by Dr. Christopher Head MD.      Mri Brain Without Contrast    Result Date: 2/3/2021  Limited MRI with diffusion sequences only obtained.No acute infarct is noted. Redemonstrated diffuse volume loss. No obvious global mass effect, midline shift or increased ventricular caliber.   This report was finalized on 2/3/2021 9:33 AM by Magdaleno Tenorio.      Xr Chest 1 View    Result Date: 2/2/2021  1. Pulmonary venous hypertension, and possibly minimal/early interstitial edema. 2. Mild left basilar atelectasis versus early changes of pneumonia.   D:  02/02/2021 E:  02/02/2021  This report was finalized on 2/2/2021 10:36 PM by Dr. Christopher Head MD.                Assessment   ASSESSMENT & PLAN:    1.  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  I did discuss this with his daughter and she understands and is in agreement that he would not want any aggressive therapy.  The good news is that his hemoglobin is staying stable in the sevens when kept warm.  The bad news is that pretty well rules out the practicality of him going to [facility] next week for his wife's burial.  I will make a video visit appointment with him through his daughter in about 3 to 4 weeks to make sure his overall care is going as planned but other than keeping him away on I have no interventions to recommend and would  not have him come out in the cold in order to get seen or to have labs drawn.  That may actually insight the very problem we are trying to avoid.  Total time of care today 15 minutes                  Nam Hurd MD    2/5/2021

## 2021-02-05 NOTE — CONSULTS
Dany Astudillo MD  Consulting physician: Wai    Chief Complaint   Patient presents with   • Altered Mental Status         HPI: Patient is an 86-year-old male brought in hospital due to confusion.  Patient been apparently having confusion for a day or so prior to coming the hospital.  Daughter states that yesterday he was possibly somewhat back to his baseline, however the patient had a very restless night.  Patient himself is unable to provide any significant HPI.      Past Medical History:   Diagnosis Date   • Asthma      Past Surgical History:   Procedure Laterality Date   • COLONOSCOPY     • HERNIA REPAIR      1982 & 2014   • SINUS SURGERY      Multiple     Current Code Status     Date Active Code Status Order ID Comments User Context       2/5/2021 1505 No CPR [id number]  Kendrick Angulo,  Inpatient     Advance Care Planning Activity      Questions for Current Code Status     Question Answer Comment    Code Status No CPR     Medical Interventions (Level of Support Prior to Arrest) Limited     Limited Support to NOT Include Intubation      Antiarrhythmic Drugs      Vasopressors      Cardioversion/Defibrillation         Current Facility-Administered Medications   Medication Dose Route Frequency Provider Last Rate Last Admin   • acetaminophen (TYLENOL) tablet 650 mg  650 mg Oral Q4H PRN Alice Mueller APRN        Or   • acetaminophen (TYLENOL) 160 MG/5ML solution 650 mg  650 mg Oral Q4H PRN Alice Mueller APRN        Or   • acetaminophen (TYLENOL) suppository 650 mg  650 mg Rectal Q4H PRN Alice Mueller APRN       • AZITHROMYCIN 500 MG/250 ML 0.9% NS IVPB (vial-mate)  500 mg Intravenous Q24H Alice Mueller APRN   500 mg at 02/04/21 1501   • budesonide-formoterol (SYMBICORT) 80-4.5 MCG/ACT inhaler 2 puff  2 puff Inhalation BID - RT Emanuel Carreno MD       • cefTRIAXone (ROCEPHIN) 1 g/100 mL 0.9% NS (MBP)  1 g Intravenous Q24H Alice Mueller APRN    "1 g at 02/04/21 1501   • donepezil (ARICEPT) tablet 10 mg  10 mg Oral Nightly Emanuel Carreno MD       • ipratropium (ATROVENT) nebulizer solution 0.5 mg  0.5 mg Nebulization 4x Daily - RT Emanuel Carreno MD       • pravastatin (PRAVACHOL) tablet 40 mg  40 mg Oral Nightly Emanuel Carreno MD       • QUEtiapine (SEROquel) tablet 25 mg  25 mg Oral Nightly Emanuel Carreno MD       • sodium chloride 0.9 % flush 10 mL  10 mL Intravenous PRN Jacob Tellez MD       • sodium chloride 0.9 % flush 10 mL  10 mL Intravenous Q12H Alice Mueller APRN   10 mL at 02/05/21 0849   • sodium chloride 0.9 % infusion  75 mL/hr Intravenous Continuous Emanuel Carreno MD 75 mL/hr at 02/05/21 0340 75 mL/hr at 02/05/21 0340     sodium chloride, 75 mL/hr, Last Rate: 75 mL/hr (02/05/21 0340)      •  acetaminophen **OR** acetaminophen **OR** acetaminophen  •  sodium chloride  Allergies   Allergen Reactions   • Bactrim [Sulfamethoxazole-Trimethoprim]    • Tetracycline Rash     Family History   Problem Relation Age of Onset   • Aneurysm Father    • Heart attack Father    • Cancer Other      Social History     Socioeconomic History   • Marital status:      Spouse name: Not on file   • Number of children: Not on file   • Years of education: Not on file   • Highest education level: Not on file   Tobacco Use   • Smoking status: Never Smoker   • Smokeless tobacco: Never Used   Substance and Sexual Activity   • Alcohol use: Yes     Comment: occ- 4x/monthly   • Drug use: No   • Sexual activity: Defer     Review of Systems -all others reviewed and found negative except as mentioned in the HPI      /72 (BP Location: Right arm, Patient Position: Lying)   Pulse 114   Temp 98.2 °F (36.8 °C) (Oral)   Resp 22   Ht 177.8 cm (70\")   Wt 68.3 kg (150 lb 8 oz)   SpO2 92%   BMI 21.59 kg/m²     Intake/Output Summary (Last 24 hours) at 2/5/2021 1505  Last data filed at 2/5/2021 0340  Gross per 24 hour   Intake 1240 ml   Output -- "   Net 1240 ml     Physical Exam:      General Appearance:    Awake, restless appearing   Head:    Normocephalic, without obvious abnormality, atraumatic   Eyes:            Lids and lashes normal, conjunctivae and sclerae normal, no   icterus, no pallor, corneas clear, PERRLA   Ears:    Ears appear intact with no abnormalities noted   Throat:   No oral lesions, no thrush, oral mucosa moist   Neck:   No adenopathy, supple, trachea midline, no thyromegaly, no   carotid bruit, no JVD   Back:     No kyphosis present, no scoliosis present, no skin lesions,      erythema or scars, no tenderness to percussion or                   palpation,   range of motion normal   Lungs:     Clear to auscultation,respirations regular, even and                  unlabored    Heart:    Regular rhythm and normal rate, normal S1 and S2, no            murmur, no gallop, no rub, no click   Chest Wall:    No abnormalities observed   Abdomen:     Normal bowel sounds, no masses, no organomegaly, soft        non-tender, non-distended, no guarding, no rebound                tenderness   Rectal:     Deferred   Extremities:   Moves all extremities well, no edema, no cyanosis, no             redness   Pulses:   Pulses palpable and equal bilaterally   Skin:   No bleeding, bruising or rash   Lymph nodes:   No palpable adenopathy           Results from last 7 days   Lab Units 02/05/21  0840   WBC 10*3/mm3 10.75   HEMOGLOBIN g/dL 7.1*   HEMATOCRIT % 21.0*   PLATELETS 10*3/mm3 487*     Results from last 7 days   Lab Units 02/05/21  0840  02/03/21  0547   SODIUM mmol/L 140   < > 143   POTASSIUM mmol/L 4.3   < > 4.5   CHLORIDE mmol/L 113*   < > 111*   CO2 mmol/L 23.0   < > 23.0   BUN mg/dL 18   < > 28*   CREATININE mg/dL 1.27   < > 1.59*   CALCIUM mg/dL 8.6   < > 8.3*   BILIRUBIN mg/dL  --   --  2.8*   ALK PHOS U/L  --   --  71   ALT (SGPT) U/L  --   --  11   AST (SGOT) U/L  --   --  19   GLUCOSE mg/dL 108*   < > 97    < > = values in this interval not  displayed.     Results from last 7 days   Lab Units 02/05/21  0840   SODIUM mmol/L 140   POTASSIUM mmol/L 4.3   CHLORIDE mmol/L 113*   CO2 mmol/L 23.0   BUN mg/dL 18   CREATININE mg/dL 1.27   GLUCOSE mg/dL 108*   CALCIUM mg/dL 8.6     Imaging Results (Last 72 Hours)     Procedure Component Value Units Date/Time    CT Head Without Contrast [[id number]] Collected: 02/02/21 1401     Updated: 02/03/21 2126    Narrative:      EXAMINATION: CT HEAD WO CONTRAST- 02/02/2021     INDICATION: AMS     TECHNIQUE: 5 mm unenhanced images through the brain     The radiation dose reduction device was turned on for each scan per the  ALARA (As Low as Reasonably Achievable) protocol.     COMPARISON: NONE     FINDINGS: The calvarium appears intact. There is evidence of previous  paranasal sinus surgery, and opacification of much of the remaining  paranasal sinuses. There is also trace left mastoid disease. Soft tissue  window images show expected degree of generalized cerebral atrophy for  age. There is no evidence of mass or mass effect, edema, infarct,  hemorrhage, hydrocephalus, or abnormal extra-axial collection. No gross  abnormalities are seen of the orbits.       Impression:      Age-appropriate generalized cerebral atrophy. No evidence of  acute intracranial disease. Incidentally noted paranasal sinus disease.     D:  02/02/2021  E:  02/02/2021        This report was finalized on 2/3/2021 9:23 PM by Dr. Christopher Head MD.       MRI Brain Without Contrast [[id number]] Collected: 02/03/21 0932     Updated: 02/03/21 0936    Narrative:      EXAMINATION: MRI BRAIN WO CONTRAST-      INDICATION: Mental status change, unknown cause; R41.82-Altered mental  status, unspecified; D64.9-Anemia, unspecified; E86.0-Dehydration     TECHNIQUE: Diffusion scans only were obtained due to patient  intolerance.     COMPARISON: CT head 1 day prior     FINDINGS: No acute infarct is noted on diffusion weighted sequences.  Redemonstrated diffuse volume loss.  No obvious global mass effect,  midline shift or increased ventricular caliber.       Impression:      Limited MRI with diffusion sequences only obtained.No acute  infarct is noted. Redemonstrated diffuse volume loss. No obvious global  mass effect, midline shift or increased ventricular caliber.        This report was finalized on 2/3/2021 9:33 AM by Magdaleno Tenorio.       XR Chest 1 View [[id number]] Collected: 02/02/21 1343     Updated: 02/02/21 2239    Narrative:      EXAMINATION: XR CHEST 1 VW-02/02/2021:     INDICATION: Weak/Dizzy/AMS, triage protocol.     COMPARISON: NONE.     FINDINGS: The heart shadow is in the upper range of normal size. The  vasculature is cephalized. There is a mild diffuse interstitial disease  pattern, resembling early changes of interstitial edema. There is  probably a component of chronic pulmonary change as well. In addition,  there is focal, rather mild patchy change of the left lung base that  could represent atelectasis or perhaps mild changes of pneumonia. The  right lung base appears clear allowing for the overlying shadows of the  calcified costal cartilages. The bony structures appear to be intact.       Impression:      1. Pulmonary venous hypertension, and possibly minimal/early  interstitial edema.  2. Mild left basilar atelectasis versus early changes of pneumonia.      D:  02/02/2021  E:  02/02/2021     This report was finalized on 2/2/2021 10:36 PM by Dr. Christopher Head MD.           Impression: Dementia  UTI  Confusion  Agitation    Plan: Talk to the patient's daughter who is at bedside.  Discussed goals of care.  Plan is to look at going home with hospice at discharge.  Unfortunate the patient is currently in four-point restraints and very agitated so I do not know if going home right now is an option.  I will see about d/cing telemetry to try to DC things that may be causing agitation.  Will adjust night time meds        Kendrick Angulo,   02/05/21  15:05  EST

## 2021-02-05 NOTE — PLAN OF CARE
Goal Outcome Evaluation:  Plan of Care Reviewed With: patient  Progress: declining  Outcome Summary: Pt NSR- ST, RA, VSS. pt was agitated and anxious last night, frequently getting out of bed, removing equipment. Unable to redirect, Seroquel proved ineffective. Bilateral uppper restraints in place. Speech is rambling and dysartric at times. Oriented to self only. No new issues at this time.

## 2021-02-05 NOTE — PLAN OF CARE
Goal Outcome Evaluation:         Pt agitated and restless most of shift, 4 pt restraints for pt safety.  Palliative and hospice consulted, tele d/c'd and pt moved to  this evening.  Daughter hopes to be able to take pt home with hospice.

## 2021-02-05 NOTE — PLAN OF CARE
Problem: Palliative Care  Goal: Enhanced Quality of Life  Outcome: Ongoing, Progressing  Intervention: Maximize Comfort  Flowsheets (Taken 2/5/2021 1628)  Pain Management Interventions: care clustered  Intervention: Optimize Function  Flowsheets (Taken 2/5/2021 1628)  Sleep/Rest Enhancement: awakenings minimized  Intervention: Promote Advance Care Planning  Flowsheets (Taken 2/5/2021 1628)  Life Transition/Adjustment: palliative care discussed  Intervention: Optimize Psychosocial Wellbeing  Flowsheets (Taken 2/5/2021 1628)  Supportive Measures: positive reinforcement provided   Goal Outcome Evaluation:  Plan of Care Reviewed With: patient(Spoke to floor nurse.)  Progress: no change  Outcome Summary: VSS. Pt asleep. Confused when awake. Dtr is PCG. Pt to be moved to [address] [place] [address]. Dtr to be here in am and palliative will see. Palliative asked to follow for support, symptom mgmt and GOC discussions.

## 2021-02-05 NOTE — PROGRESS NOTES
Continued Stay Note  [facility]     Patient Name: Negrito Joe  MRN: [id number]  Today's Date: 2/5/2021    Admit Date: 2/2/2021    Discharge Plan     Row Name 02/05/21 1336       Plan    Plan Comments  I met with Leonila, Mr. Joe's daughter at the bedside. Mr. Joe was agitated overnight and placed in four point restraints. Leonila tells me that she she anticipates taking him back to her home after this hospitalization. She tells me that her mother passed away at the end of last year and she cared for her in her home. She tells me that she has already been in contact with hired caregivers to assist her. She requests a hospice consult as she reports that her mother was followed by hospice and if no improvement is made, she would like hospice services for her dad as well. I have notified Dr. Carreno.    Final Discharge Disposition Code  30 - still a patient        Discharge Codes    No documentation.             Prasanna Adair RN

## 2021-02-05 NOTE — ED PROVIDER NOTES
Subjective   History of Present Illness    Review of Systems    No past medical history on file.    Not on File    No past surgical history on file.    No family history on file.    Social History     Socioeconomic History   • Marital status:      Spouse name: Not on file   • Number of children: Not on file   • Years of education: Not on file   • Highest education level: Not on file           Objective   Physical Exam    Procedures           ED Course                                           MDM    Final diagnoses:   None            Jacob Tellez MD  02/09/21 1026

## 2021-02-05 NOTE — SIGNIFICANT NOTE
Called per nursing secondary to increased agitation and pt pulling at lines, trying to get out of bed. Fall risk.   Seroquel ordered without improvement.   Given pt borderline low BP, will order restraints for now.

## 2021-02-05 NOTE — PROGRESS NOTES
HEMATOLOGY/ONCOLOGY PROGRESS NOTE    Subjective      CC: Confused    SUBJECTIVE: Denies complaints        Past Medical History, Past Surgical History, Social History, Family History have been reviewed and are without significant changes except as mentioned.      Medications:  The current medication list was reviewed in the EMR    ALLERGIES:   Allergies   Allergen Reactions   • Bactrim [Sulfamethoxazole-Trimethoprim]    • Tetracycline Rash       ROS:  A comprehensive 14 point review of systems was performed and was negative except as mentioned.      Objective      Vitals:    02/04/21 0737 02/04/21 1141 02/04/21 1726 02/04/21 1904   BP: 129/58 118/44 103/64 108/42   BP Location: Right arm Right arm Right arm Right arm   Patient Position: Lying Lying Lying Lying   Pulse: 97 71 74 85   Resp: 18 18 18 18   Temp: 98.8 °F (37.1 °C) 98.2 °F (36.8 °C) 98.4 °F (36.9 °C) 98.4 °F (36.9 °C)   TempSrc: Oral Oral Oral Oral   SpO2:       Weight:       Height:            ECOG: (4) Completely Disabled, Unable to Carry Out Self Care & Confined to Bed or Chair    General: Frail and disheveled appearing, in no acute distress  HEENT: sclerae anicteric, oropharynx clear  Lymphatics: no cervical, supraclavicular, inguinal, or axillary adenopathy  Cardiovascular: regular rate and rhythm, no murmurs  Lungs: clear to auscultation bilaterally  Abdomen: soft, nontender, nondistended.  No palpable organomegaly  Extremities: no lower extremity edema  Skin: no rashes, lesions, bruising, or petechiae  Neuro: Alert but disoriented to place and time and reality. Moves all extremities.    RECENT LABS:    Results from last 7 days   Lab Units 02/04/21  0600 02/03/21  0547 02/02/21  1850 02/02/21  1245   WBC 10*3/mm3 9.75 9.16  --  11.63*   HEMOGLOBIN g/dL 7.2* 7.0* 5.5* 6.2*   PLATELETS 10*3/mm3 447 387  --  413     Results from last 7 days   Lab Units 02/04/21  0600 02/03/21  0547 02/02/21  1245   SODIUM mmol/L 139 143 135*   POTASSIUM mmol/L 3.5 4.5  5.3*   CO2 mmol/L 24.0 23.0 23.0   BUN mg/dL 25* 28* 34*   CREATININE mg/dL 1.43* 1.59* 1.74*   GLUCOSE mg/dL 101* 97 115*     Results from last 7 days   Lab Units 02/03/21  0547 02/02/21  1245   AST (SGOT) U/L 19 23   ALT (SGPT) U/L 11 14   BILIRUBIN mg/dL 2.8* 2.0*   ALK PHOS U/L 71 84         Ct Head Without Contrast    Result Date: 2/3/2021  Age-appropriate generalized cerebral atrophy. No evidence of acute intracranial disease. Incidentally noted paranasal sinus disease.  D:  02/02/2021 E:  02/02/2021   This report was finalized on 2/3/2021 9:23 PM by Dr. Christopher Head MD.      Mri Brain Without Contrast    Result Date: 2/3/2021  Limited MRI with diffusion sequences only obtained.No acute infarct is noted. Redemonstrated diffuse volume loss. No obvious global mass effect, midline shift or increased ventricular caliber.   This report was finalized on 2/3/2021 9:33 AM by Magdaleno Tenorio.      Xr Chest 1 View    Result Date: 2/2/2021  1. Pulmonary venous hypertension, and possibly minimal/early interstitial edema. 2. Mild left basilar atelectasis versus early changes of pneumonia.   D:  02/02/2021 E:  02/02/2021  This report was finalized on 2/2/2021 10:36 PM by Dr. Christopher Head MD.                Assessment   ASSESSMENT & PLAN:  1. 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  In general he is actually done fairly well just by keeping warm but recently had been socializing outdoors without his overcoat on and that probably necessitated that.  Unfortunately, his wife is being buried at [facility] next week.  His daughter was hoping to fly him there but I do not think he is in any shape to withstand the flight nor to withstand the cold and I spoke with her this evening and she understands  this.  Both she and I are in favor of best supportive care and warm transfusion support and nothing more.  This does not respond to steroids or splenectomy.  Rituxan can help but takes months to work and I do not think he would be inclined towards that as we have talked about it in more coherent days of his life.  Total time of care today discussing and reviewing records and treatment options etc. consumed 1 hour of patient care time today.                    Nam Hurd MD    2/4/2021

## 2021-02-05 NOTE — PROGRESS NOTES
[facility] Medicine Services  PROGRESS NOTE    Patient Name: Negrito Joe  : 1934  MRN: [id number]    Date of Admission: 2021  Primary Care Physician: Dany Astudillo MD    Subjective   Subjective     CC:  AMS    HPI:  Very agitated, confused overnight.  Had to put in restraints.  Was given seroquel 12.5mg last pm but didn't help per nursing.  Daughter at bedside and states that patient much more confused today.  Note she states that sometimes he will get a little confused at home.      ROS:  Unable to obtain d/t AMS    Objective   Objective     Vital Signs:   Temp:  [98 °F (36.7 °C)-98.4 °F (36.9 °C)] 98.2 °F (36.8 °C)  Heart Rate:  [] 119  Resp:  [18-20] 20  BP: (103-145)/(42-78) 145/78        Physical Exam:  Constitutional: awake and alert, laying in bed in 4 point restraints, daughter at bedside  HENT: NCAT, mucous membranes moist  Respiratory: Clear to auscultation bilaterally, respiratory effort normal   Cardiovascular: RRR, no murmurs, rubs, or gallops  Gastrointestinal: Positive bowel sounds, soft, nontender, nondistended  Musculoskeletal: No bilateral ankle edema  Psychiatric: Appropriate affect, cooperative  Neurologic: awake and alert, in bed in 4 point restraints nonsensical speech, moves all extremities  Skin: No rashes      Results Reviewed:  Results from last 7 days   Lab Units 21  0600 21  0547 21  18521  1245   WBC 10*3/mm3 9.75 9.16  --  11.63*   HEMOGLOBIN g/dL 7.2* 7.0* 5.5* 6.2*   HEMATOCRIT % 21.6* 21.1* 16.5* 17.7*   PLATELETS 10*3/mm3 447 387  --  413   PROCALCITONIN ng/mL  --   --   --  0.15     Results from last 7 days   Lab Units 21  0600 21  0547 21  18521  1245   SODIUM mmol/L 139 143  --  135*   POTASSIUM mmol/L 3.5 4.5  --  5.3*   CHLORIDE mmol/L 111* 111*  --  103   CO2 mmol/L 24.0 23.0  --  23.0   BUN mg/dL 25* 28*  --  34*   CREATININE mg/dL 1.43* 1.59*  --  1.74*   GLUCOSE mg/dL  101* 97  --  115*   CALCIUM mg/dL 8.6 8.3*  --  8.8   ALT (SGPT) U/L  --  11  --  14   AST (SGOT) U/L  --  19  --  23   TROPONIN T ng/mL  --   --   --  <0.010   PROBNP pg/mL  --   --  702.5 706.6     Estimated Creatinine Clearance: 35.8 mL/min (A) (by C-G formula based on SCr of 1.43 mg/dL (H)).    Microbiology Results Abnormal     Procedure Component Value - Date/Time    Blood Culture - Blood, Arm, Right [[id number]] Collected: 02/02/21 1309    Lab Status: Preliminary result Specimen: Blood from Arm, Right Updated: 02/04/21 1330     Blood Culture No growth at 2 days    Blood Culture - Blood, Arm, Left [[id number]] Collected: 02/02/21 1300    Lab Status: Preliminary result Specimen: Blood from Arm, Left Updated: 02/04/21 1330     Blood Culture No growth at 2 days    Urine Culture - Urine, Urine, Clean Catch [[id number]]  (Normal) Collected: 02/02/21 1301    Lab Status: Final result Specimen: Urine, Clean Catch Updated: 02/03/21 1534     Urine Culture No growth    COVID PRE-OP / PRE-PROCEDURE SCREENING ORDER (NO ISOLATION) - Swab, Nasopharynx [[id number]]  (Normal) Collected: 02/02/21 1413    Lab Status: Final result Specimen: Swab from Nasopharynx Updated: 02/02/21 1442    Narrative:      The following orders were created for panel order COVID PRE-OP / PRE-PROCEDURE SCREENING ORDER (NO ISOLATION) - Swab, Nasopharynx.  Procedure                               Abnormality         Status                     ---------                               -----------         ------                     COVID-19, ABBOTT IN-HOUS...[[id number]]  Normal              Final result                 Please view results for these tests on the individual orders.    COVID-19, ABBOTT IN-HOUSE,NASAL Swab (NO TRANSPORT MEDIA) 2 HR TAT - Swab, Nasopharynx [[id number]]  (Normal) Collected: 02/02/21 1413    Lab Status: Final result Specimen: Swab from Nasopharynx Updated: 02/02/21 1442     COVID19 Presumptive Negative    Narrative:      Fact sheet for  providers: https://www.fda.gov/media/330102/download     Fact sheet for patients: https://www.fda.gov/media/712288/download    Test performed by PCR.  If inconsistent with clinical signs and symptoms patient should be tested with different authorized molecular test.          Imaging Results (Last 24 Hours)     ** No results found for the last 24 hours. **               I have reviewed the medications:  Scheduled Meds:azithromycin, 500 mg, Intravenous, Q24H  cefTRIAXone, 1 g, Intravenous, Q24H  sodium chloride, 10 mL, Intravenous, Q12H      Continuous Infusions:sodium chloride, 75 mL/hr, Last Rate: 75 mL/hr (02/05/21 0340)      PRN Meds:.•  acetaminophen **OR** acetaminophen **OR** acetaminophen  •  sodium chloride    Assessment/Plan   Assessment & Plan     Active Hospital Problems    Diagnosis  POA   • **AMS (altered mental status) [R41.82]  Unknown   • Hemolytic anemia (CMS/HCC) [D58.9]  Yes   • CELY (acute kidney injury) (CMS/HCC) [N17.9]  Yes   • Hypotension [I95.9]  Unknown   • Monoclonal gammopathy [D47.2]  Yes   • Hyperlipidemia [E78.5]  Yes   • 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        Brief Hospital Course to date:  Negrito Joe is a 86 y.o. 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  The patient was brought to the ER by his son in law who provided the history.  The patient has become more confused over the last 2 days.  He normally lives alone with help during the day.  On admit patient was without complaints but confused.     In the ER, creatinine 1.74 (baseline 0.78).  H/H 6.2/17.7 (2 months ago was 11/36).  CT head is nonacute.  COVID negative.  Patient received azithromycin and Rocephin in the ER due to CXR with mild left basilar atelectasis versus early changes of PNA.  No cough or fever though.  UA with small leukocytes and  moderate WBC but no bacteria on micro.      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   Son in law reports this is new x 2 days, reports 1 similar episode and was d/t AI hemolytic anemia then?  --CT head with no acute finding  --?dementia exacerbated by mild UTI versus mild early pneumonia or bronchitis.    --MRI was limited as only diffusion sequences obtained,  No acute infarct    Dementia  --continue home aricept.  Seroquel for agitation, will give a dose now and increase dose to 25mg qhs for night.  Cont restraints for now     Anemia  Hx of cold autoimmune hemolytic anemia  Monoclonal gammopathy.    --hgb as low as 5.5 in ER. Stool for guiac negative, specimen was double checked and considered accurate, s/p 2units prbc on 2021, Hb has been stable since  -Per Dr Hurd  only tx for now is to transfuse.  However, PRBCs MUST BE TRANSFUSED WITH A BLOOD WARMER.  --IVFs as needed okay but should either warm IVF or warm pt (ie warming blankets etc)  --per update from patient's PCP/Dr. Astudillo his daughter reports patient has been standing outside without coat talking with neighbor and Dr. Hurd agrees could explain hemolysis     CELY  -improving with IVF    Hypotension  dehydration  -BP better today, cont NS at  75/hr, must keep warm       Mild leukocytosis  Atelectasis vs early mild PNA changes   Possible UTI  --Cx's no growth currently  --CXR with Lt basilar atelectasis vs changes of early PNA  --V rocephin/azith Day 4.        BLE chronic edema  --family reports x 10 + years  --no known hx of CHF    --HLD  Asthma  --Home meds, oxygen as needed    Bereavement  -passing of his wife 2020, her  is next  at [facility] but Dr. Hurd has discussed with daughter that patient is not in shape to withstand the flight or the cold     DVT prophylaxis: sequentials, No a/c due to anemia        Disposition: I expect the  patient to be discharged TBD    CODE STATUS:   Code Status and Medical Interventions:   Ordered at: 02/02/21 1758     Level Of Support Discussed With:    Patient    Next of Kin (If No Surrogate)     Code Status:    CPR     Medical Interventions (Level of Support Prior to Arrest):    Full       Emanuel Carreno MD  02/05/21

## 2021-02-06 PROBLEM — G93.40 ACUTE ENCEPHALOPATHY: Status: ACTIVE | Noted: 2021-01-01

## 2021-02-06 NOTE — PROGRESS NOTES
[facility] Medicine Services  PROGRESS NOTE    Patient Name: Negrito Joe  : 1934  MRN: [id number]    Date of Admission: 2021  Primary Care Physician: Dany Astudillo MD    Subjective   Subjective     CC:  AMS    HPI:  Agitated and restless per nursing and requiring restraints, despite medications. Lethargic but arouses, but seems restless when aroused. Confused otherwise.    ROS:  Unable to obtain d/t AMS    Objective   Objective     Vital Signs:   Temp:  [98.2 °F (36.8 °C)-99.6 °F (37.6 °C)] 99.6 °F (37.6 °C)  Heart Rate:  [] 95  Resp:  [20-22] 20  BP: (137-139)/(66-72) 137/66        Physical Exam:  NAD, lethargic but arouses, but then seems restless  OP clear, dry  Neck supple  Tachy  Decreased at bases, poor effort, but without tachypnea/non-labored, and otherwise clear  +BS, ND, NT, soft  No c/c/e  No rashes  LUNDBERG  Normal affect    Results Reviewed:  Results from last 7 days   Lab Units 21  0840 21  0600 21  0547  21  1245   WBC 10*3/mm3 10.75 9.75 9.16  --  11.63*   HEMOGLOBIN g/dL 7.1* 7.2* 7.0*   < > 6.2*   HEMATOCRIT % 21.0* 21.6* 21.1*   < > 17.7*   PLATELETS 10*3/mm3 487* 447 387  --  413   PROCALCITONIN ng/mL  --   --   --   --  0.15    < > = values in this interval not displayed.     Results from last 7 days   Lab Units 21  0840 21  0600 21  0547 21  1850 21  1245   SODIUM mmol/L 140 139 143  --  135*   POTASSIUM mmol/L 4.3 3.5 4.5  --  5.3*   CHLORIDE mmol/L 113* 111* 111*  --  103   CO2 mmol/L 23.0 24.0 23.0  --  23.0   BUN mg/dL 18 25* 28*  --  34*   CREATININE mg/dL 1.27 1.43* 1.59*  --  1.74*   GLUCOSE mg/dL 108* 101* 97  --  115*   CALCIUM mg/dL 8.6 8.6 8.3*  --  8.8   ALT (SGPT) U/L  --   --  11  --  14   AST (SGOT) U/L  --   --  19  --  23   TROPONIN T ng/mL  --   --   --   --  <0.010   PROBNP pg/mL  --   --   --  702.5 706.6     Estimated Creatinine Clearance: 40.3 mL/min (by C-G formula based  on SCr of 1.27 mg/dL).    Microbiology Results Abnormal     Procedure Component Value - Date/Time    Blood Culture - Blood, Arm, Right [[id number]] Collected: 02/02/21 1309    Lab Status: Preliminary result Specimen: Blood from Arm, Right Updated: 02/05/21 1331     Blood Culture No growth at 3 days    Blood Culture - Blood, Arm, Left [[id number]] Collected: 02/02/21 1300    Lab Status: Preliminary result Specimen: Blood from Arm, Left Updated: 02/05/21 1331     Blood Culture No growth at 3 days    Urine Culture - Urine, Urine, Clean Catch [[id number]]  (Normal) Collected: 02/02/21 1301    Lab Status: Final result Specimen: Urine, Clean Catch Updated: 02/03/21 1534     Urine Culture No growth    COVID PRE-OP / PRE-PROCEDURE SCREENING ORDER (NO ISOLATION) - Swab, Nasopharynx [[id number]]  (Normal) Collected: 02/02/21 1413    Lab Status: Final result Specimen: Swab from Nasopharynx Updated: 02/02/21 1442    Narrative:      The following orders were created for panel order COVID PRE-OP / PRE-PROCEDURE SCREENING ORDER (NO ISOLATION) - Swab, Nasopharynx.  Procedure                               Abnormality         Status                     ---------                               -----------         ------                     COVID-19, ABBOTT IN-HOUS...[[id number]]  Normal              Final result                 Please view results for these tests on the individual orders.    COVID-19, ABBOTT IN-HOUSE,NASAL Swab (NO TRANSPORT MEDIA) 2 HR TAT - Swab, Nasopharynx [[id number]]  (Normal) Collected: 02/02/21 1413    Lab Status: Final result Specimen: Swab from Nasopharynx Updated: 02/02/21 1442     COVID19 Presumptive Negative    Narrative:      Fact sheet for providers: https://www.fda.gov/media/450369/download     Fact sheet for patients: https://www.fda.gov/media/719611/download    Test performed by PCR.  If inconsistent with clinical signs and symptoms patient should be tested with different authorized molecular test.                 Imaging Results (Last 24 Hours)     ** No results found for the last 24 hours. **               I have reviewed the medications:  Scheduled Meds:azithromycin, 500 mg, Intravenous, Q24H  budesonide-formoterol, 2 puff, Inhalation, BID - RT  cefTRIAXone, 1 g, Intravenous, Q24H  donepezil, 10 mg, Oral, Nightly  ipratropium, 0.5 mg, Nebulization, 4x Daily - RT  pravastatin, 40 mg, Oral, Nightly  QUEtiapine, 50 mg, Oral, Nightly  sodium chloride, 10 mL, Intravenous, Q12H  ziprasidone, 20 mg, Intramuscular, Once      Continuous Infusions:sodium chloride, 75 mL/hr, Last Rate: 75 mL/hr (02/06/21 0542)      PRN Meds:.•  acetaminophen **OR** acetaminophen **OR** acetaminophen  •  haloperidol lactate  •  sodium chloride    Assessment/Plan   Assessment & Plan     Active Hospital Problems    Diagnosis  POA   • **AMS (altered mental status) [R41.82]  Unknown   • Hemolytic anemia (CMS/HCC) [D58.9]  Yes   • CELY (acute kidney injury) (CMS/HCC) [N17.9]  Yes   • Hypotension [I95.9]  Unknown   • Monoclonal gammopathy [D47.2]  Yes   • Hyperlipidemia [E78.5]  Yes   • 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        Brief Hospital Course to date:  Negrito Joe is a 86 y.o. 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  The patient was brought to the ER by his son in law who provided the history.  The patient has become more confused over the last 2 days.  He normally lives alone with help during the day.  On admit patient was without complaints but confused.     In the ER, creatinine 1.74 (baseline 0.78).  H/H 6.2/17.7 (2 months ago was 11/36).  CT head is nonacute.  COVID negative.  Patient received azithromycin and Rocephin in the ER due to CXR with mild left basilar atelectasis versus early changes of PNA.  No cough or fever though.  UA with small leukocytes and  moderate WBC but no bacteria on micro.      AMS/Encephalopathy  --confused at home, talking about flying planes at home per report  --CT with nothing acutely noted  --Possible underlying dementia, exacerbated by possible UTI/URI  --MRI limited    Dementia  --with behaviour disturbance, titrating seroquel  --needs to mobilize and remove restraints, when can safely do so       Anemia  Hx of cold autoimmune hemolytic anemia  Monoclonal gammopathy.   --Hgb 5.5 on admission, transfused, PRBC must be warmed/transfused with warmer  --IVF continue with warmer     CELY  --improving with IVF, monitoring    Hypotension  Dehyrdation  --continue IVF       Mild leukocytosis  Atelectasis vs early mild PNA changes   Possible UTI  --Cultures negative to date  --complete course of abx and monitor       BLE chronic edema  --Chronic per family    --HLD  Asthma  --Home meds, oxygen as needed    Bereavement  --passing of his wife 2020, her  is next  at [facility] but Dr. Hurd has discussed with daughter that patient is not in shape to withstand the flight or the cold     DVT prophylaxis: SCDS        Disposition: I expect the patient to be discharged TBD    CODE STATUS:   Code Status and Medical Interventions:   Ordered at: 21 7759     Limited Support to NOT Include:    Intubation    Antiarrhythmic Drugs    Vasopressors    Cardioversion/Defibrillation     Code Status:    No CPR     Medical Interventions (Level of Support Prior to Arrest):    Limited       Christopher Rodriges MD  21

## 2021-02-06 NOTE — PLAN OF CARE
Goal Outcome Evaluation:  Plan of Care Reviewed With: daughter  Progress: declining  Outcome Summary: pt was restless and agitated this am. Pt in restraints but now calm after ativan and seroquel. Resp slightly labored. 02 pnc.  Dtr Leonila at the bedside, tearful. Plans for her mothers  on Wednesday at [place]. support to her.  Transition to inpt hospice. Pt had abrupt decline in past few days.  Abd tender to palpation. pt given supp. no bm documented since admit.

## 2021-02-06 NOTE — PLAN OF CARE
Patient with restlessness all night.  Disoriented x 4.  Restraints.  PRN medications given, APRN notified for additional.  Skin integrity maintained.  Requiring oxygen.  Incontinent.  Medications crushed in applesauce, tolerates liquids by mouth.  IVF continue warmed.    Palliative and hospice consulted.

## 2021-02-06 NOTE — PROGRESS NOTES
Patient was not awake, resting comfortably, alone in the room.  Practiced pastoral presence with patient. Patient did not wake during the visit.

## 2021-02-07 NOTE — PROGRESS NOTES
"Hospice Progress Note    Patient Name: Negrito Joe   : 1934  Gender: male    Code Status: comfort measures    Date of Admission: 2021    Subjective:    86yoM admitted inpt Hospice  for acute encephalopathy.     Overnight notes reviewed: Comfort measures maintained during the shift. No acute changes. Pt had a BM. Daughter remained at bedside supportive of pt and care. Pt experienced restlessness but scheduled meds were able to keep pt calm and comfortable. Pt rested comfortably during shift.    Today pt resting comfortably, undisturbed by exam after requiring multiple prns.    - PRNs: (all this morning)  Haldol 1mg x1  Dilaudid 1mg x1  Toradol 15mg x1  Ativan 1mg x2    - Held: none    - Intake/Output  *PO: NPO  *Urine: 1000ml  *LBM: , moderate      ROS:  Review of Systems   Unable to perform ROS: Acuity of condition       Reviewed current scheduled and prn medications for route, type, dose and frequency.     •  acetaminophen  •  bisacodyl  •  furosemide  •  glycopyrrolate  •  haloperidol lactate  •  HYDROmorphone  •  HYDROmorphone  •  ketorolac  •  LORazepam  •  polyvinyl alcohol  •  Scopolamine    Objective:   /66   Pulse 95   Temp 98.2 °F (36.8 °C)   Resp 18   Ht 177.8 cm (70\")   Wt 68.3 kg (150 lb 9.2 oz)   SpO2 95%   BMI 21.61 kg/m²      PPS: Palliative Performance Scale score as of 2021, 22:24 EST is 10% based on the following measures:   Ambulation: Totally bed bound  Activity and Evidence of Disease: Unable to do any work, extensive evidence of disease  Self-Care: Total care  Intake:  Mouth care only  LOC: Drowsy or coma      Physical Exam:  Constitutional:       Comments: Appears quite comfortable after prns. Pale. Frail. Elderly. Undisturbed by exam. Restraints removed.    HENT:      Head:      Comments: Thin, cheekbones prominent.     Mouth/Throat:      Mouth: Mucous membranes are dry.   Eyes:      Comments: closed   Neck:      Comments: thin  Cardiovascular:      " Comments: DHT  Pulmonary:      Comments: NAD. Respirations nonlabored. No audible congestion. Shallow, even.  Abdominal:      General: Abdomen is flat. There is no distension.      Palpations: Abdomen is soft.   Musculoskeletal:      Comments: No edema   Skin:     General: Skin is dry.      Coloration: Skin is pale.   Neurological:      Comments: Does not follow commands   Psychiatric:      Comments: No facial grimacing; no moaning         Acute encephalopathy      Assessment/Plan:     86yoM admitted in Hospice 2/6 for acute encephalopathy.      Agitation  Anxiety  Pain, msk  Congestion  Constipation  End of Life Care    - increase schedule dilaudid 0.5mg to q4h    - increase scheduled ativan 0.5mg to q4h    - palliative mouth rinse scheduled and prn    - prns reviewed/adjusted for comfort    Discharge Disposition: actively dying    Coordinated care with Nursing    Total Visit Time: 20min  Face to Face Time: 5min    Justification for care:  Patient meets criteria for acute in-patient care with required nursing assessment and interventions for symptoms with IV medications.      Radha Shepard, MARIA ISABEL, MHA, APRN  [facility]  Hospice and Palliative Care Nurse Practitioner  02/07/21  11:06 EST

## 2021-02-07 NOTE — PLAN OF CARE
Goal Outcome Evaluation:  Plan of Care Reviewed With: daughter  Progress: no change  Outcome Summary: Comfort measures maintained during the shift. No acute changes. Pt had a BM. Daughter remained at bedside supportive of pt and care. Pt experienced restlessness but scheduled meds were able to keep pt calm and comfortable. Pt rested comfortably during shift.

## 2021-02-07 NOTE — H&P
Hospice History and Physical     Patient Name:  Negrito Joe   : 1934   Sex: male    Patient Care Team:  Dany Astudillo MD as PCP - General (Internal Medicine)    Code Status: Comfort Measures    Subjective     Per Hospitalist Discharge Summary:    Negrito Joe is a 86 y.o. 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  The patient was brought to the ER by his son in law who provided the history.  The patient has become more confused over the last 2 days.  He normally lives alone with help during the day.  On admit patient was without complaints but confused.     In the ER, creatinine 1.74 (baseline 0.78).  H/H 6.2/17.7 (2 months ago was ).  CT head is nonacute.  COVID negative.  Patient received azithromycin and Rocephin in the ER due to CXR with mild left basilar atelectasis versus early changes of PNA.  No cough or fever though.  UA with small leukocytes and moderate WBC but no bacteria on micro.     [end of copied text]    Given pt's continued decline (increasing agitation, anorexia, AMS), Palliative Care was consulted to assist with goals and plan of care. Ultimately pt's family elected for comfort measures. Mr. Joe was admitted to in Hospice on 2021 for mgmt of acute symptoms 2/2 acute encephalopathy.       Review of Systems  Review of Systems   Unable to perform ROS: Acuity of condition       History  Past Medical History:   Diagnosis Date   • Asthma      Past Surgical History:   Procedure Laterality Date   • COLONOSCOPY     • HERNIA REPAIR       &    • SINUS SURGERY      Multiple     Current Facility-Administered Medications   Medication Dose Route Frequency Provider Last Rate Last Admin   • acetaminophen (TYLENOL) suppository 650 mg  650 mg Rectal Q4H PRN Radha Shepard H, APRN       • bisacodyl (DULCOLAX) suppository 10 mg  10 mg Rectal Daily PRN Radha Shepard, APRN       •  furosemide (LASIX) injection 20 mg  20 mg Intravenous Q6H PRN Radha Shepard, APRN       • glycopyrrolate (ROBINUL) injection 0.2 mg  0.2 mg Intravenous Q6H PRN Radha Shepard, APRN       • haloperidol lactate (HALDOL) injection 1 mg  1 mg Intravenous Q4H PRN Radha Shepard, APRN   1 mg at 02/07/21 0830   • HYDROmorphone (DILAUDID) injection 0.5 mg  0.5 mg Intravenous Q1H PRN Radha Shepard, APRN       • HYDROmorphone (DILAUDID) injection 0.5 mg  0.5 mg Intravenous Q6H Radha Shepard, APRN   0.5 mg at 02/07/21 0758   • HYDROmorphone (DILAUDID) injection 1 mg  1 mg Intravenous Q1H PRN Radha Shepard, APRN   1 mg at 02/07/21 0831   • ketorolac (TORADOL) injection 15 mg  15 mg Intravenous Q6H PRN Radha Shepard, APRN   15 mg at 02/07/21 0808   • LORazepam (ATIVAN) injection 0.5 mg  0.5 mg Intravenous Q6H Radha Shepard, APRN   0.5 mg at 02/07/21 0751   • LORazepam (ATIVAN) injection 1 mg  1 mg Intravenous Q4H PRN Radha Shepard, APRN   1 mg at 02/07/21 0901   • polyvinyl alcohol (LIQUIFILM) 1.4 % ophthalmic solution 2 drop  2 drop Both Eyes BID Radha Shepard, APRN   2 drop at 02/06/21 2042   • polyvinyl alcohol (LIQUIFILM) 1.4 % ophthalmic solution 2 drop  2 drop Both Eyes Q1H PRN Radha Shepard, APRN       • Scopolamine (TRANSDERM-SCOP) 1.5 MG/3DAYS patch 1 patch  1 patch Transdermal Q72H PRN Radha Shepard, APRN            •  acetaminophen  •  bisacodyl  •  furosemide  •  glycopyrrolate  •  haloperidol lactate  •  HYDROmorphone  •  HYDROmorphone  •  ketorolac  •  LORazepam  •  polyvinyl alcohol  •  Scopolamine  Allergies   Allergen Reactions   • Bactrim [Sulfamethoxazole-Trimethoprim]    • Tetracycline Rash     Family History   Problem Relation Age of Onset   • Aneurysm Father    • Heart attack Father    • Cancer Other      Social History     Socioeconomic History   • Marital status:      Spouse name: Not on file   • Number of children: Not on file   • Years of  education: Not on file   • Highest education level: Not on file   Tobacco Use   • Smoking status: Never Smoker   • Smokeless tobacco: Never Used   Substance and Sexual Activity   • Alcohol use: Yes     Comment: occ- 4x/monthly   • Drug use: No   • Sexual activity: Defer       Objective     Vital Signs  Temp:  [98.2 °F (36.8 °C)-98.3 °F (36.8 °C)] 98.2 °F (36.8 °C)  Heart Rate:  [95] 95  Resp:  [18] 18  BP: (137)/(66) 137/66      PPS: Palliative Performance Scale score as of 2/8/2021, 22:11 EST is 10% based on the following measures:   Ambulation: Totally bed bound  Activity and Evidence of Disease: Unable to do any work, extensive evidence of disease  Self-Care: Total care  Intake:  Mouth care only  LOC: Drowsy or coma      Physical Exam:  Physical Exam  Constitutional:       Comments: Appears quite comfortable after prns. Pale. Frail. Elderly. Undisturbed by exam. Restraints removed.    HENT:      Head:      Comments: Thin, cheekbones prominent.     Mouth/Throat:      Mouth: Mucous membranes are dry.   Eyes:      Comments: closed   Neck:      Comments: thin  Cardiovascular:      Comments: DHT  + radial pulses  Pulmonary:      Comments: NAD. Respirations nonlabored. No audible congestion. Shallow, even.  Abdominal:      General: Abdomen is flat. There is no distension.      Palpations: Abdomen is soft.   Musculoskeletal:      Comments: No edema   Skin:     General: Skin is dry.      Coloration: Skin is pale.   Neurological:      Comments: Does not follow commands   Psychiatric:      Comments: No facial grimacing; no moaning         Results Review:   No results found for: HGBA1C    Lab Results   Component Value Date    GLUCOSE 88 02/06/2021    BUN 15 02/06/2021    CREATININE 1.23 02/06/2021    EGFRIFNONA 56 (L) 02/06/2021    BCR 12.2 02/06/2021    K 4.6 02/06/2021    CO2 22.0 02/06/2021    CALCIUM 8.2 (L) 02/06/2021    ALBUMIN 2.90 (L) 02/03/2021    AST 19 02/03/2021    ALT 11 02/03/2021       WBC   Date Value Ref  Range Status   02/06/2021 8.51 3.40 - 10.80 10*3/mm3 Final     RBC   Date Value Ref Range Status   02/06/2021 1.89 (L) 4.14 - 5.80 10*6/mm3 Final     Hemoglobin   Date Value Ref Range Status   02/06/2021 6.4 (C) 13.0 - 17.7 g/dL Final     Hematocrit   Date Value Ref Range Status   02/06/2021 19.5 (C) 37.5 - 51.0 % Final     MCV   Date Value Ref Range Status   02/06/2021 103.2 (H) 79.0 - 97.0 fL Final     MCH   Date Value Ref Range Status   02/06/2021 33.9 (H) 26.6 - 33.0 pg Final     MCHC   Date Value Ref Range Status   02/06/2021 32.8 31.5 - 35.7 g/dL Final     RDW   Date Value Ref Range Status   02/06/2021 15.6 (H) 12.3 - 15.4 % Final     RDW-SD   Date Value Ref Range Status   02/06/2021 56.9 (H) 37.0 - 54.0 fl Final     MPV   Date Value Ref Range Status   02/06/2021 8.7 6.0 - 12.0 fL Final     Platelets   Date Value Ref Range Status   02/06/2021 501 (H) 140 - 450 10*3/mm3 Final     Neutrophil %   Date Value Ref Range Status   02/06/2021 69.0 42.7 - 76.0 % Final     Lymphocyte %   Date Value Ref Range Status   02/06/2021 18.7 (L) 19.6 - 45.3 % Final     Monocyte %   Date Value Ref Range Status   02/06/2021 6.0 5.0 - 12.0 % Final     Eosinophil %   Date Value Ref Range Status   02/06/2021 4.9 0.3 - 6.2 % Final     Basophil %   Date Value Ref Range Status   02/06/2021 0.5 0.0 - 1.5 % Final     Immature Grans %   Date Value Ref Range Status   02/06/2021 0.9 (H) 0.0 - 0.5 % Final     Neutrophils, Absolute   Date Value Ref Range Status   02/06/2021 5.87 1.70 - 7.00 10*3/mm3 Final     Lymphocytes, Absolute   Date Value Ref Range Status   02/06/2021 1.59 0.70 - 3.10 10*3/mm3 Final     Monocytes, Absolute   Date Value Ref Range Status   02/06/2021 0.51 0.10 - 0.90 10*3/mm3 Final     Eosinophils, Absolute   Date Value Ref Range Status   02/06/2021 0.42 (H) 0.00 - 0.40 10*3/mm3 Final     Basophils, Absolute   Date Value Ref Range Status   02/06/2021 0.04 0.00 - 0.20 10*3/mm3 Final     Immature Grans, Absolute   Date Value  Ref Range Status   2021 0.08 (H) 0.00 - 0.05 10*3/mm3 Final     nRBC   Date Value Ref Range Status   2021 0.0 0.0 - 0.2 /100 WBC Final         Acute encephalopathy      Assessment/Plan   Assessment/Plan:     86yoM admitted inpt Hospice  for acute encephalopathy.     Agitation  Anxiety  Pain, msk  Congestion  Constipation  End of Life Care    Schedule dilaudid 0.5mg q6h    Schedule ativan 0.5mg q6h    Scheduled and prn eye gtts    Prns for comfort    Remove restraints    Coordinated care with Nursing.     Discussion at bedside with pt's dtr and son in law regarding end of life s/s and symptom mgmt. Support provided. Hospice contact information shared.     *Of note: pt's wife  2020 and her  is  at [facility].    Total Visit Time: 75min  Face to Face Time: 35min    Justification for care:  Patient meets criteria for acute in-patient care with required nursing assessment and interventions for symptoms with IV medications.      Radha Shepard, MARIA ISABEL, MHA, APRN  [facility] Care Navigators  Hospice and Palliative Care Nurse Practitioner  21  11:05 EST

## 2021-02-07 NOTE — PROGRESS NOTES
Continued Stay Note  [facility]     Patient Name: Negrito Joe    Today's Date: 2/7/2021    Admit Date: 2/6/2021    Assessment Note:  1/7 Negrito Joe is an 86 y.o. recently  male His wife passed away in Sept 2020 admitted to Hospice with Metabolic encephalopathy. Patient has been restless, Gore found to be dislodged and once corrected drained straw colored UA. Scant bleeding from urethra. Patient appeared very peaceful by the end of the visit. Zero family present. Medications adjusted to better control restlessness and pain. Continue to monitor for nonverbal indications of pain and maintain safety measures.  Discharge plan:    Currently patient remains GIP for complications of EOL care requiring skilled nursing and medical management of symptoms. Discharge plan dependent on a decreased level of care and survival of this admission.    POC:  Family support and education R/T EOL care  Somatic Pain R/T immobility    Hospice Criteria:  Hospice care provides support and care for persons and their families with a life limiting disease. Hospice is a Medicare benefit and requires   1) Patient prognosis is 6 months or less to live,   2) Patient no longer going through curative therapies.   3) Agreement of 2 physicians that patient's condition is life limiting and will most likely result in death in <6 months.  Hospice is a Level of care not a location and can be provided in various settings based on patient's needs.    Medicare guidelines state that hospice patients in the hospital require skilled nursing, medical interventions and a level of care that cannot be met in any other setting. This stay is most commonly less then two week.   A patient may be Hospice appropriate but not inpatient appropriate based on this criteria.  Once a Hospice referral is reviewed and  appears to be stable enough to be transferred to a lower level of care with hospice then that is the preference.     Leydi MEHTA. RN.  DADAPN.  [facility] Nav[facility]ators  Hospice Care  [phone]

## 2021-02-07 NOTE — PLAN OF CARE
Problem: Adult Inpatient Plan of Care  Goal: Plan of Care Review  Outcome: Ongoing, Progressing  Flowsheets (Taken 2/7/2021 1743)  Plan of Care Reviewed With: patient  Outcome Summary: Patient has shown signs of pain/discomfort, treated with scheduled and PRN medication. Daughter at bedside throughout the day. Will continue to monitor.  Goal: Patient-Specific Goal (Individualized)  Outcome: Ongoing, Progressing  Goal: Absence of Hospital-Acquired Illness or Injury  Outcome: Ongoing, Progressing  Intervention: Identify and Manage Fall Risk  Recent Flowsheet Documentation  Taken 2/7/2021 1600 by Marily Perez RN  Safety Promotion/Fall Prevention: safety round/check completed  Taken 2/7/2021 1400 by Marily Perez RN  Safety Promotion/Fall Prevention: safety round/check completed  Taken 2/7/2021 1200 by Marily Perez RN  Safety Promotion/Fall Prevention: safety round/check completed  Taken 2/7/2021 1000 by Marily Perez RN  Safety Promotion/Fall Prevention: safety round/check completed  Taken 2/7/2021 0800 by Marily Perez RN  Safety Promotion/Fall Prevention: safety round/check completed  Intervention: Prevent Skin Injury  Recent Flowsheet Documentation  Taken 2/7/2021 1600 by Marily Perez RN  Body Position: position maintained  Taken 2/7/2021 1400 by Marily Perez RN  Body Position: position maintained  Taken 2/7/2021 1200 by Marily Perez RN  Body Position: position maintained  Taken 2/7/2021 1000 by Marily Perez RN  Body Position: position maintained  Taken 2/7/2021 0800 by Marily Perez RN  Body Position: position maintained  Goal: Optimal Comfort and Wellbeing  Outcome: Ongoing, Progressing  Intervention: Provide Person-Centered Care  Recent Flowsheet Documentation  Taken 2/7/2021 0800 by Marily Perez RN  Trust Relationship/Rapport:   care explained   choices provided  Goal: Readiness for Transition of Care  Outcome: Ongoing, Progressing   Goal Outcome Evaluation:  Plan of Care Reviewed With: patient      Outcome Summary: Patient has shown signs of pain/discomfort, treated with scheduled and PRN medication. Daughter at bedside throughout the day. Will continue to monitor.

## 2021-02-08 NOTE — SIGNIFICANT NOTE
Exam confirms with auscultation zero audible heart tones and zero audible respirations. Mr.John EDDY Joe was pronounced dead at 0325 on 02/08/2021.  MD notified by Patient's RN.    Elisabet Hagan RN  Clinical House Supervisor  2/8/2021 03:50 EST

## 2021-02-08 NOTE — DISCHARGE SUMMARY
[facility] Medicine Services  DISCHARGE TO INPATIENT HOSPICE    Patient Name: Negrito Joe  : 1934  MRN: [id number]    Date of Admission: 2021  Date of Discharge:  2021  Primary Care Physician: Dany Astudillo MD    Consults     Date and Time Order Name Status Description    2021 1336 Inpatient Palliative Care MD Consult Completed     2/3/2021 1250 Inpatient Hematology & Oncology Consult Completed         Hospital Course     Presenting Problem:   Hemolytic anemia (CMS/HCC) [D58.9]    Active Hospital Problems    Diagnosis  POA   • **AMS (altered mental status) [R41.82]  Unknown   • Hemolytic anemia (CMS/HCC) [D58.9]  Yes   • CELY (acute kidney injury) (CMS/HCC) [N17.9]  Yes   • Hypotension [I95.9]  Unknown   • Monoclonal gammopathy [D47.2]  Yes   • Hyperlipidemia [E78.5]  Yes   • 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          Hospital Course:  Negrito Joe is a 86 y.o. 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  The patient was brought to the ER by his son in law who provided the history.  The patient has become more confused over the last 2 days.  He normally lives alone with help during the day.  On admit patient was without complaints but confused.     In the ER, creatinine 1.74 (baseline 0.78).  H/H 6.2/17.7 (2 months ago was ).  CT head is nonacute.  COVID negative.  Patient received azithromycin and Rocephin in the ER due to CXR with mild left basilar atelectasis versus early changes of PNA.  No cough or fever though.  UA with small leukocytes and moderate WBC but no bacteria on micro.       AMS/Encephalopathy  --confused at home, talking about flying planes at home per report  --CT with nothing acutely noted  --Possible underlying dementia, exacerbated by possible UTI/URI  --MRI  limited     Dementia  --with behaviour disturbance, titrating seroquel        Anemia  Hx of cold autoimmune hemolytic anemia  Monoclonal gammopathy.   --Hgb 5.5 on admission, transfused, PRBC must be warmed/transfused with warmer  --IVF continue with warmer      CELY  --improving with IVF, monitoring     Hypotension  Dehyrdation  --continue IVF        Mild leukocytosis  Atelectasis vs early mild PNA changes   Possible UTI  --Cultures negative to date  --complete course of abx and monitor     Family opted for inpatient hospice care and this was facilitated.     Day of Discharge     HPI:   AMS     HPI:  Agitated and restless per nursing and requiring restraints, despite medications. Lethargic but arouses, but seems restless when aroused. Confused otherwise.     ROS:  Unable to obtain d/t AMS        Objective      Objective      Vital Signs:   Temp:  [98.2 °F (36.8 °C)-99.6 °F (37.6 °C)] 99.6 °F (37.6 °C)  Heart Rate:  [] 95  Resp:  [20-22] 20  BP: (137-139)/(66-72) 137/66     Physical Exam:  NAD, lethargic but arouses, but then seems restless  OP clear, dry  Neck supple  Tachy  Decreased at bases, poor effort, but without tachypnea/non-labored, and otherwise clear  +BS, ND, NT, soft  No c/c/e  No rashes  LUNDBERG  Normal affect      Discharge Details     Discharge Disposition:  Transfer care to inpatient Hospice at [facility]    Time Spent on Discharge:  33 minutes    Christopher Rodriges MD  02/08/21

## 2021-02-08 NOTE — PLAN OF CARE
Goal Outcome Evaluation:  Plan of Care Reviewed With: patient, family  Progress: no change  Outcome Summary: comfort care continued

## 2021-02-09 NOTE — DISCHARGE SUMMARY
Date of Death:  2/8/2021  Time of Death:  0325    Presenting Problem/History of Present Illness    Acute encephalopathy      Hospital Course    Per Hospitalist Discharge Summary:     Negrito Joe is a 86 y.o. 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  The patient was brought to the ER by his son in law who provided the history.  The patient has become more confused over the last 2 days.  He normally lives alone with help during the day.  On admit patient was without complaints but confused.     In the ER, creatinine 1.74 (baseline 0.78).  H/H 6.2/17.7 (2 months ago was 11/36).  CT head is nonacute.  COVID negative.  Patient received azithromycin and Rocephin in the ER due to CXR with mild left basilar atelectasis versus early changes of PNA.  No cough or fever though.  UA with small leukocytes and moderate WBC but no bacteria on micro.      [end of copied text]     Given pt's continued decline (increasing agitation, anorexia, AMS), Palliative Care was consulted to assist with goals and plan of care. Ultimately pt's family elected for comfort measures. Mr. Joe was admitted to in Hospice on 2/6/2021 for mgmt of acute symptoms 2/2 acute encephalopathy.       Social History:  Social History     Tobacco Use   • Smoking status: Never Smoker   • Smokeless tobacco: Never Used   Substance Use Topics   • Alcohol use: Yes     Comment: occ- 4x/monthly         Consults:   Consults     Date and Time Order Name Status Description    2/5/2021 1336 Inpatient Palliative Care MD Consult Completed     2/3/2021 1250 Inpatient Hematology & Oncology Consult Completed           Exam confirms with auscultation zero audible heart tones and zero audible respirations. Mr.John EDDY Joe was pronounced dead at 0325 on 02/08/2021.  MD notified by Patient's RN.     Elisabet Hagan RN  Clinical House Supervisor  2/8/2021 03:50 ALEJANDRA Shepard  DNP, MHA, APRN  [facility] Nav[facility]ators  Hospice and Palliative Care Nurse Practitioner  02/08/21  22:29 EST